# Patient Record
Sex: FEMALE | Race: WHITE | Employment: FULL TIME | URBAN - NONMETROPOLITAN AREA
[De-identification: names, ages, dates, MRNs, and addresses within clinical notes are randomized per-mention and may not be internally consistent; named-entity substitution may affect disease eponyms.]

---

## 2019-09-25 ENCOUNTER — HOSPITAL ENCOUNTER (OUTPATIENT)
Dept: WOMENS IMAGING | Age: 44
Discharge: HOME | End: 2019-09-25
Payer: COMMERCIAL

## 2019-09-25 DIAGNOSIS — Z12.39 BREAST CANCER SCREENING: ICD-10-CM

## 2019-09-25 DIAGNOSIS — N95.8 ARTIFICIAL MENOPAUSE: ICD-10-CM

## 2019-09-25 PROCEDURE — 77080 DXA BONE DENSITY AXIAL: CPT

## 2019-09-25 PROCEDURE — 77063 BREAST TOMOSYNTHESIS BI: CPT

## 2021-06-11 ENCOUNTER — OFFICE VISIT (OUTPATIENT)
Dept: OBSTETRICS AND GYNECOLOGY | Facility: CLINIC | Age: 46
End: 2021-06-11

## 2021-06-11 VITALS
SYSTOLIC BLOOD PRESSURE: 118 MMHG | WEIGHT: 189 LBS | HEIGHT: 65 IN | DIASTOLIC BLOOD PRESSURE: 70 MMHG | BODY MASS INDEX: 31.49 KG/M2

## 2021-06-11 DIAGNOSIS — R53.83 FATIGUE, UNSPECIFIED TYPE: ICD-10-CM

## 2021-06-11 DIAGNOSIS — Z80.3 FAMILY HISTORY OF BREAST CANCER: ICD-10-CM

## 2021-06-11 DIAGNOSIS — Z01.419 WELL WOMAN EXAM WITH ROUTINE GYNECOLOGICAL EXAM: Primary | ICD-10-CM

## 2021-06-11 DIAGNOSIS — Z80.41 FAMILY HISTORY OF OVARIAN CANCER: ICD-10-CM

## 2021-06-11 DIAGNOSIS — Z12.31 ENCOUNTER FOR SCREENING MAMMOGRAM FOR MALIGNANT NEOPLASM OF BREAST: ICD-10-CM

## 2021-06-11 DIAGNOSIS — R23.2 HOT FLASHES: ICD-10-CM

## 2021-06-11 DIAGNOSIS — Z12.4 CERVICAL CANCER SCREENING: ICD-10-CM

## 2021-06-11 PROCEDURE — 99386 PREV VISIT NEW AGE 40-64: CPT | Performed by: NURSE PRACTITIONER

## 2021-06-11 PROCEDURE — 99214 OFFICE O/P EST MOD 30 MIN: CPT | Performed by: NURSE PRACTITIONER

## 2021-06-11 RX ORDER — MONTELUKAST SODIUM 10 MG/1
10 TABLET ORAL
COMMUNITY
Start: 2021-03-25 | End: 2021-11-12 | Stop reason: SDUPTHER

## 2021-06-11 RX ORDER — ESTRADIOL 0.5 MG/1
0.5 TABLET ORAL DAILY
COMMUNITY
Start: 2021-05-25 | End: 2021-08-24

## 2021-06-11 RX ORDER — LEVOCETIRIZINE DIHYDROCHLORIDE 5 MG/1
5 TABLET, FILM COATED ORAL EVERY EVENING
COMMUNITY

## 2021-06-11 RX ORDER — ESCITALOPRAM OXALATE 10 MG/1
10 TABLET ORAL DAILY
COMMUNITY
Start: 2021-03-25 | End: 2022-02-23 | Stop reason: SDUPTHER

## 2021-06-11 RX ORDER — OCTISALATE, AVOBENZONE, HOMOSALATE, AND OCTOCRYLENE 29.4; 29.4; 49; 25.48 MG/ML; MG/ML; MG/ML; MG/ML
2 LOTION TOPICAL DAILY
COMMUNITY

## 2021-06-12 LAB
25(OH)D3+25(OH)D2 SERPL-MCNC: 48.7 NG/ML (ref 30–100)
ALBUMIN SERPL-MCNC: 4.9 G/DL (ref 3.5–5.2)
ALBUMIN/GLOB SERPL: 2.7 G/DL
ALP SERPL-CCNC: 72 U/L (ref 39–117)
ALT SERPL-CCNC: 30 U/L (ref 1–33)
AST SERPL-CCNC: 24 U/L (ref 1–32)
BASOPHILS # BLD AUTO: 0.06 10*3/MM3 (ref 0–0.2)
BASOPHILS NFR BLD AUTO: 1.3 % (ref 0–1.5)
BILIRUB SERPL-MCNC: 0.8 MG/DL (ref 0–1.2)
BUN SERPL-MCNC: 16 MG/DL (ref 6–20)
BUN/CREAT SERPL: 19.5 (ref 7–25)
CALCIUM SERPL-MCNC: 10.4 MG/DL (ref 8.6–10.5)
CHLORIDE SERPL-SCNC: 100 MMOL/L (ref 98–107)
CHOLEST SERPL-MCNC: 217 MG/DL (ref 0–200)
CO2 SERPL-SCNC: 31.1 MMOL/L (ref 22–29)
CORTIS SERPL-MCNC: 6 UG/DL
CREAT SERPL-MCNC: 0.82 MG/DL (ref 0.57–1)
DHEA-S SERPL-MCNC: 107 UG/DL (ref 41.2–243.7)
EOSINOPHIL # BLD AUTO: 0.13 10*3/MM3 (ref 0–0.4)
EOSINOPHIL NFR BLD AUTO: 2.8 % (ref 0.3–6.2)
ERYTHROCYTE [DISTWIDTH] IN BLOOD BY AUTOMATED COUNT: 12.9 % (ref 12.3–15.4)
ESTRADIOL SERPL-MCNC: 29.7 PG/ML
FSH SERPL-ACNC: 50.8 MIU/ML
GLOBULIN SER CALC-MCNC: 1.8 GM/DL
GLUCOSE SERPL-MCNC: 88 MG/DL (ref 65–99)
HBA1C MFR BLD: 5.5 % (ref 4.8–5.6)
HCT VFR BLD AUTO: 45.9 % (ref 34–46.6)
HDLC SERPL-MCNC: 40 MG/DL (ref 40–60)
HGB BLD-MCNC: 15.3 G/DL (ref 12–15.9)
IMM GRANULOCYTES # BLD AUTO: 0.01 10*3/MM3 (ref 0–0.05)
IMM GRANULOCYTES NFR BLD AUTO: 0.2 % (ref 0–0.5)
LDLC SERPL CALC-MCNC: 137 MG/DL (ref 0–100)
LDLC/HDLC SERPL: 3.32 {RATIO}
LH SERPL-ACNC: 37.2 MIU/ML
LYMPHOCYTES # BLD AUTO: 1.26 10*3/MM3 (ref 0.7–3.1)
LYMPHOCYTES NFR BLD AUTO: 26.8 % (ref 19.6–45.3)
MCH RBC QN AUTO: 29.9 PG (ref 26.6–33)
MCHC RBC AUTO-ENTMCNC: 33.3 G/DL (ref 31.5–35.7)
MCV RBC AUTO: 89.6 FL (ref 79–97)
MONOCYTES # BLD AUTO: 0.61 10*3/MM3 (ref 0.1–0.9)
MONOCYTES NFR BLD AUTO: 13 % (ref 5–12)
NEUTROPHILS # BLD AUTO: 2.63 10*3/MM3 (ref 1.7–7)
NEUTROPHILS NFR BLD AUTO: 55.9 % (ref 42.7–76)
NRBC BLD AUTO-RTO: 0 /100 WBC (ref 0–0.2)
PLATELET # BLD AUTO: 281 10*3/MM3 (ref 140–450)
POTASSIUM SERPL-SCNC: 4 MMOL/L (ref 3.5–5.2)
PROGEST SERPL-MCNC: <0.1 NG/ML
PROT SERPL-MCNC: 6.7 G/DL (ref 6–8.5)
RBC # BLD AUTO: 5.12 10*6/MM3 (ref 3.77–5.28)
SODIUM SERPL-SCNC: 144 MMOL/L (ref 136–145)
T4 FREE SERPL-MCNC: 1.06 NG/DL (ref 0.93–1.7)
TESTOST SERPL-MCNC: 20 NG/DL (ref 4–50)
TRIGL SERPL-MCNC: 221 MG/DL (ref 0–150)
TSH SERPL DL<=0.005 MIU/L-ACNC: 1.85 UIU/ML (ref 0.27–4.2)
VLDLC SERPL CALC-MCNC: 40 MG/DL (ref 5–40)
WBC # BLD AUTO: 4.7 10*3/MM3 (ref 3.4–10.8)

## 2021-06-14 DIAGNOSIS — Z80.3 FAMILY HISTORY OF BREAST CANCER: Primary | ICD-10-CM

## 2021-06-28 ENCOUNTER — HOSPITAL ENCOUNTER (OUTPATIENT)
Dept: MAMMOGRAPHY | Facility: HOSPITAL | Age: 46
Discharge: HOME | End: 2021-06-28
Admitting: NURSE PRACTITIONER

## 2021-06-28 ENCOUNTER — LAB (OUTPATIENT)
Dept: LAB | Facility: HOSPITAL | Age: 46
End: 2021-06-28

## 2021-06-28 DIAGNOSIS — Z12.31 ENCOUNTER FOR SCREENING MAMMOGRAM FOR MALIGNANT NEOPLASM OF BREAST: ICD-10-CM

## 2021-06-28 DIAGNOSIS — Z80.3 FAMILY HISTORY OF BREAST CANCER: ICD-10-CM

## 2021-06-28 PROCEDURE — 77067 SCR MAMMO BI INCL CAD: CPT

## 2021-06-28 PROCEDURE — 77063 BREAST TOMOSYNTHESIS BI: CPT

## 2021-07-28 ENCOUNTER — OFFICE VISIT (OUTPATIENT)
Dept: FAMILY MEDICINE CLINIC | Facility: CLINIC | Age: 46
End: 2021-07-28

## 2021-07-28 VITALS
OXYGEN SATURATION: 98 % | WEIGHT: 191.2 LBS | SYSTOLIC BLOOD PRESSURE: 122 MMHG | HEIGHT: 67 IN | HEART RATE: 67 BPM | DIASTOLIC BLOOD PRESSURE: 78 MMHG | BODY MASS INDEX: 30.01 KG/M2 | TEMPERATURE: 98.6 F

## 2021-07-28 DIAGNOSIS — R91.1 PULMONARY NODULE: Primary | ICD-10-CM

## 2021-07-28 DIAGNOSIS — R52 PAIN: ICD-10-CM

## 2021-07-28 PROCEDURE — 99203 OFFICE O/P NEW LOW 30 MIN: CPT | Performed by: FAMILY MEDICINE

## 2021-07-28 NOTE — PROGRESS NOTES
Subjective   Екатерина Stovall is a 45 y.o. female.     45-year-old female with pain right ear-history of pulmonary nodule on the left      The following portions of the patient's history were reviewed and updated as appropriate: allergies, current medications, past family history, past medical history, past social history, past surgical history and problem list.    Review of Systems   HENT: Negative for facial swelling, mouth sores and postnasal drip.    Respiratory:        Pulmonary nodule left lung followed by low-dose CT last done  In 2016   Cardiovascular: Negative for chest pain.       Objective   Physical Exam  Vitals and nursing note reviewed.   Constitutional:       Appearance: Normal appearance.   HENT:      Right Ear: Tympanic membrane and ear canal normal.      Left Ear: Tympanic membrane and ear canal normal.   Cardiovascular:      Rate and Rhythm: Normal rate and regular rhythm.      Pulses: Normal pulses.      Heart sounds: Normal heart sounds.   Pulmonary:      Effort: Pulmonary effort is normal.      Breath sounds: Normal breath sounds.   Neurological:      General: No focal deficit present.      Mental Status: She is alert and oriented to person, place, and time.   Psychiatric:         Mood and Affect: Mood normal.         Behavior: Behavior normal.         Assessment/Plan   Diagnoses and all orders for this visit:    1. Pulmonary nodule (Primary)  -     CT Chest Low Dose Wo; Future    2. Pain       Plan above plus TMJ on the right--advised to get Materna vaccinations plus low-dose CT

## 2021-07-28 NOTE — PATIENT INSTRUCTIONS
Mediterranean Diet  A Mediterranean diet refers to food and lifestyle choices that are based on the traditions of countries located on the [place]. This way of eating has been shown to help prevent certain conditions and improve outcomes for people who have chronic diseases, like kidney disease and heart disease.  What are tips for following this plan?  Lifestyle  · Cook and eat meals together with your family, when possible.  · Drink enough fluid to keep your urine clear or pale yellow.  · Be physically active every day. This includes:  ? Aerobic exercise like running or swimming.  ? Leisure activities like gardening, walking, or housework.  · Get 7-8 hours of sleep each night.  · If recommended by your health care provider, drink red wine in moderation. This means 1 glass a day for nonpregnant women and 2 glasses a day for men. A glass of wine equals 5 oz (150 mL).  Reading food labels    · Check the serving size of packaged foods. For foods such as rice and pasta, the serving size refers to the amount of cooked product, not dry.  · Check the total fat in packaged foods. Avoid foods that have saturated fat or trans fats.  · Check the ingredients list for added sugars, such as corn syrup.  Shopping  · At the grocery store, buy most of your food from the areas near the walls of the store. This includes:  ? Fresh fruits and vegetables (produce).  ? Grains, beans, nuts, and seeds. Some of these may be available in unpackaged forms or large amounts (in bulk).  ? Fresh seafood.  ? Poultry and eggs.  ? Low-fat dairy products.  · Buy whole ingredients instead of prepackaged foods.  · Buy fresh fruits and vegetables in-season from local farmers markets.  · Buy frozen fruits and vegetables in resealable bags.  · If you do not have access to quality fresh seafood, buy precooked frozen shrimp or canned fish, such as tuna, salmon, or sardines.  · Buy small amounts of raw or cooked vegetables, salads, or olives from  the deli or salad bar at your store.  · Stock your pantry so you always have certain foods on hand, such as olive oil, canned tuna, canned tomatoes, rice, pasta, and beans.  Cooking  · Cook foods with extra-virgin olive oil instead of using butter or other vegetable oils.  · Have meat as a side dish, and have vegetables or grains as your main dish. This means having meat in small portions or adding small amounts of meat to foods like pasta or stew.  · Use beans or vegetables instead of meat in common dishes like chili or lasagna.  · [address] with different cooking methods. Try roasting or broiling vegetables instead of steaming or sautéeing them.  · Add frozen vegetables to soups, stews, pasta, or rice.  · Add nuts or seeds for added healthy fat at each meal. You can add these to yogurt, salads, or vegetable dishes.  · Marinate fish or vegetables using olive oil, lemon juice, garlic, and fresh herbs.  Meal planning    · Plan to eat 1 vegetarian meal one day each week. Try to work up to 2 vegetarian meals, if possible.  · Eat seafood 2 or more times a week.  · Have healthy snacks readily available, such as:  ? Vegetable sticks with hummus.  ? Greek yogurt.  ? Fruit and nut trail mix.  · Eat balanced meals throughout the week. This includes:  ? Fruit: 2-3 servings a day  ? Vegetables: 4-5 servings a day  ? Low-fat dairy: 2 servings a day  ? Fish, poultry, or lean meat: 1 serving a day  ? Beans and legumes: 2 or more servings a week  ? Nuts and seeds: 1-2 servings a day  ? Whole grains: 6-8 servings a day  ? Extra-virgin olive oil: 3-4 servings a day  · Limit red meat and sweets to only a few servings a month  What are my food choices?  · Mediterranean diet  ? Recommended  § Grains: Whole-grain pasta. Brown rice. Bulgar wheat. Polenta. Couscous. Whole-wheat bread. Oatmeal. Quinoa.  § Vegetables: Artichokes. Beets. Broccoli. Cabbage. Carrots. Eggplant. Green beans. Chard. Kale. Spinach. Onions. Leeks. Peas. Squash.  Tomatoes. Peppers. Radishes.  § Fruits: Apples. Apricots. Avocado. Berries. Bananas. Cherries. Dates. Figs. Grapes. Darian. Melon. Oranges. Peaches. Plums. Pomegranate.  § Meats and other protein foods: Beans. Almonds. Sunflower seeds. Pine nuts. Peanuts. Cod. Vermilion. Scallops. Shrimp. Tuna. Tilapia. Clams. Oysters. Eggs.  § Dairy: Low-fat milk. Cheese. Greek yogurt.  § Beverages: Water. Red wine. Herbal tea.  § Fats and oils: Extra virgin olive oil. Avocado oil. Grape seed oil.  § Sweets and desserts: Greek yogurt with honey. Baked apples. Poached pears. Trail mix.  § Seasoning and other foods: Basil. Cilantro. Coriander. Cumin. Mint. Parsley. Augustine. Rosemary. Tarragon. Garlic. Oregano. Thyme. Pepper. Balsalmic vinegar. Tahini. Hummus. Tomato sauce. Olives. Mushrooms.  ? Limit these  § Grains: Prepackaged pasta or rice dishes. Prepackaged cereal with added sugar.  § Vegetables: Deep fried potatoes (french fries).  § Fruits: Fruit canned in syrup.  § Meats and other protein foods: Beef. Pork. Lamb. Poultry with skin. Hot dogs. [place].  § Dairy: Ice cream. Sour cream. Whole milk.  § Beverages: Juice. Sugar-sweetened soft drinks. Beer. Liquor and spirits.  § Fats and oils: Butter. Canola oil. Vegetable oil. Beef fat (tallow). Lard.  § Sweets and desserts: Cookies. Cakes. Pies. Candy.  § Seasoning and other foods: Mayonnaise. Premade sauces and marinades.  The items listed may not be a complete list. Talk with your dietitian about what dietary choices are right for you.  Summary  · The Mediterranean diet includes both food and lifestyle choices.  · Eat a variety of fresh fruits and vegetables, beans, nuts, seeds, and whole grains.  · Limit the amount of red meat and sweets that you eat.  · Talk with your health care provider about whether it is safe for you to drink red wine in moderation. This means 1 glass a day for nonpregnant women and 2 glasses a day for men. A glass of wine equals 5 oz (150 mL).  This information  is not intended to replace advice given to you by your health care provider. Make sure you discuss any questions you have with your health care provider.  Document Revised: 08/17/2017 Document Reviewed: 08/10/2017  Elsevier Patient Education © 2020 [employer]

## 2021-08-06 ENCOUNTER — TELEPHONE (OUTPATIENT)
Dept: FAMILY MEDICINE CLINIC | Facility: CLINIC | Age: 46
End: 2021-08-06

## 2021-08-06 ENCOUNTER — HOSPITAL ENCOUNTER (OUTPATIENT)
Dept: CT IMAGING | Facility: HOSPITAL | Age: 46
Discharge: HOME | End: 2021-08-06
Admitting: FAMILY MEDICINE

## 2021-08-06 ENCOUNTER — PATIENT ROUNDING (BHMG ONLY) (OUTPATIENT)
Dept: FAMILY MEDICINE CLINIC | Facility: CLINIC | Age: 46
End: 2021-08-06

## 2021-08-06 DIAGNOSIS — R91.1 PULMONARY NODULE: ICD-10-CM

## 2021-08-06 PROCEDURE — 71250 CT THORAX DX C-: CPT

## 2021-08-06 NOTE — TELEPHONE ENCOUNTER
Caller: Екатерина Stovall    Relationship to patient: Self    Best call back number: [phone]    CAN PATIENT GET THE MODERNA VACCINE NOW?

## 2021-08-06 NOTE — PROGRESS NOTES
August 6, 2021    Hello, may I speak with Екатерина Stovall?    My name is Alisha Ramirez     I am the Practice Manager with [facility]  [address] [facility]  [facility] [id number] [phone].    Before we get started may I verify your date of birth? 1975    I am calling to officially welcome you to our practice and ask about your recent visit. Is this a good time to talk? yes    Tell me about your visit with us. What things went well?  Patient stated that everything with visit went very well.       We're always looking for ways to make our patients' experiences even better. Do you have recommendations on ways we may improve?  no    Overall were you satisfied with your first visit to our practice? yes       I appreciate you taking the time to speak with me today. Is there anything else I can do for you? no      Thank you, and have a great day.

## 2021-08-24 ENCOUNTER — TELEMEDICINE (OUTPATIENT)
Dept: OBSTETRICS AND GYNECOLOGY | Facility: CLINIC | Age: 46
End: 2021-08-24

## 2021-08-24 DIAGNOSIS — E66.9 CLASS 1 OBESITY WITHOUT SERIOUS COMORBIDITY WITH BODY MASS INDEX (BMI) OF 32.0 TO 32.9 IN ADULT, UNSPECIFIED OBESITY TYPE: ICD-10-CM

## 2021-08-24 DIAGNOSIS — E66.3 OVERWEIGHT: ICD-10-CM

## 2021-08-24 DIAGNOSIS — N95.1 MENOPAUSAL STATE: Primary | ICD-10-CM

## 2021-08-24 PROCEDURE — 99213 OFFICE O/P EST LOW 20 MIN: CPT | Performed by: NURSE PRACTITIONER

## 2021-08-24 RX ORDER — PHENTERMINE HYDROCHLORIDE 37.5 MG/1
37.5 CAPSULE ORAL EVERY MORNING
Qty: 30 CAPSULE | Refills: 0 | Status: SHIPPED | OUTPATIENT
Start: 2021-08-24 | End: 2021-09-20 | Stop reason: SDUPTHER

## 2021-08-24 RX ORDER — ESTRADIOL 1 MG/1
1 TABLET ORAL DAILY
Qty: 30 TABLET | Refills: 2 | Status: SHIPPED | OUTPATIENT
Start: 2021-08-24 | End: 2022-01-03 | Stop reason: SDUPTHER

## 2021-08-24 NOTE — PROGRESS NOTES
Patient here via video visit to review hormone panel labs and to discuss starting something for weight loss.  Reviewed labs at length with patient.  Increased Estrace from 0.5 mg to 1 mg r/t hot flashes/night sweats.      Patient wants to discuss weight loss aids.  Weighed 192 lbs this morning for a BMI of 32.  Discussed options and risks/benefits.  Ej reviewed.  RX for Phentermine sent to pharmacy and patient instructed in use.  RTO in 4 weeks for f/u or sooner prn.

## 2021-08-25 ENCOUNTER — TELEPHONE (OUTPATIENT)
Dept: OBSTETRICS AND GYNECOLOGY | Facility: CLINIC | Age: 46
End: 2021-08-25

## 2021-08-25 NOTE — TELEPHONE ENCOUNTER
Patient pharmacy contacted the office stating they do not have the Phentermine capsules that were sent in yesterday and wanting to know if it is ok to substitute with the Phentermine tablets. Spoke with pharmacist and informed that substitute tablets for capsules.

## 2021-09-17 ENCOUNTER — OFFICE VISIT (OUTPATIENT)
Dept: FAMILY MEDICINE CLINIC | Facility: CLINIC | Age: 46
End: 2021-09-17

## 2021-09-17 VITALS
SYSTOLIC BLOOD PRESSURE: 115 MMHG | WEIGHT: 186.2 LBS | HEART RATE: 73 BPM | TEMPERATURE: 98.4 F | OXYGEN SATURATION: 99 % | DIASTOLIC BLOOD PRESSURE: 73 MMHG | HEIGHT: 67 IN | BODY MASS INDEX: 29.22 KG/M2 | RESPIRATION RATE: 16 BRPM

## 2021-09-17 DIAGNOSIS — M79.671 RIGHT FOOT PAIN: ICD-10-CM

## 2021-09-17 DIAGNOSIS — G57.61 MORTON'S NEUROMA OF RIGHT FOOT: Primary | ICD-10-CM

## 2021-09-17 PROCEDURE — 99213 OFFICE O/P EST LOW 20 MIN: CPT | Performed by: NURSE PRACTITIONER

## 2021-09-17 NOTE — PROGRESS NOTES
CC: right foot pain    History:  Екатерина Stovall is a 45 y.o. female who presents today for evaluation of the above problems.      Pain in her right foot in the ball below 2nd and 3rd toe and then across the top in a similar location.   Present for 1-2 weeks. Getting worse. Feels like she is walking on a rock.  Pain gets worse throughout the day the more she is on it.     Scheduled to get second COVID vaccine on Monday.     HPI  ROS:  Review of Systems   Musculoskeletal:        Right foot pain       No Known Allergies  Past Medical History:   Diagnosis Date   • Anxiety    • Seasonal allergies      Past Surgical History:   Procedure Laterality Date   • APPENDECTOMY     • LAPAROSCOPIC SALPINGOOPHERECTOMY Bilateral    • OOPHORECTOMY     • TOTAL ABDOMINAL HYSTERECTOMY      RIVKA w/ BSO due to pain caused by varicose veins and heavy bleeding.   • TUBAL ABDOMINAL LIGATION       Family History   Problem Relation Age of Onset   • Ovarian cancer Mother    • Breast cancer Paternal Grandmother    • Breast cancer Maternal Grandmother    • Uterine cancer Maternal Aunt    • Breast cancer Maternal Aunt    • Colon cancer Neg Hx    • Melanoma Neg Hx       reports that she has never smoked. She has never used smokeless tobacco. Alcohol use questions deferred to the physician. Drug use questions deferred to the physician.      Current Outpatient Medications:   •  escitalopram (LEXAPRO) 10 MG tablet, Take 10 mg by mouth Daily., Disp: , Rfl:   •  estradiol (Estrace) 1 MG tablet, Take 1 tablet by mouth Daily., Disp: 30 tablet, Rfl: 2  •  levocetirizine (XYZAL) 5 MG tablet, Take 5 mg by mouth Every Evening., Disp: , Rfl:   •  montelukast (SINGULAIR) 10 MG tablet, Take 10 mg by mouth every night at bedtime., Disp: , Rfl:   •  phentermine 37.5 MG capsule, Take 1 capsule by mouth Every Morning., Disp: 30 capsule, Rfl: 0  •  diclofenac (VOLTAREN) 50 MG EC tablet, Take 1 tablet by mouth 2 (Two) Times a Day As Needed (foot pain)., Disp: 60 tablet,  "Rfl: 0  •  Probiotic Product (Align) 4 MG capsule, Take  by mouth., Disp: , Rfl:     OBJECTIVE:  /73 (BP Location: Right arm, Patient Position: Sitting, Cuff Size: Adult)   Pulse 73   Temp 98.4 °F (36.9 °C) (Temporal)   Resp 16   Ht 168.9 cm (66.5\")   Wt 84.5 kg (186 lb 3.2 oz)   SpO2 99%   BMI 29.60 kg/m²    Physical Exam  Vitals reviewed.   Constitutional:       Appearance: She is well-developed.   Cardiovascular:      Rate and Rhythm: Normal rate.   Pulmonary:      Effort: Pulmonary effort is normal.   Neurological:      Mental Status: She is alert and oriented to person, place, and time.   Psychiatric:         Behavior: Behavior normal.         Assessment/Plan    Diagnoses and all orders for this visit:    1. Sigala's neuroma of right foot (Primary)    2. Right foot pain  -     diclofenac (VOLTAREN) 50 MG EC tablet; Take 1 tablet by mouth 2 (Two) Times a Day As Needed (foot pain).  Dispense: 60 tablet; Refill: 0    Advised to take a week off from strenuous exercise and walking.  Would like to do steroids, but can't due to upcoming vaccine.     Advised if no improvement in one week notify clinic for referral.    An After Visit Summary was printed and given to the patient at discharge.  Return if symptoms worsen or fail to improve, for Next scheduled follow up.       BALDO Delgado 9/17/21    Electronically signed.  "

## 2021-09-20 ENCOUNTER — TELEMEDICINE (OUTPATIENT)
Dept: OBSTETRICS AND GYNECOLOGY | Facility: CLINIC | Age: 46
End: 2021-09-20

## 2021-09-20 DIAGNOSIS — E66.9 OBESITY (BMI 30-39.9): Primary | ICD-10-CM

## 2021-09-20 DIAGNOSIS — E66.3 OVERWEIGHT: ICD-10-CM

## 2021-09-20 PROCEDURE — 99213 OFFICE O/P EST LOW 20 MIN: CPT | Performed by: NURSE PRACTITIONER

## 2021-09-20 RX ORDER — PHENTERMINE HYDROCHLORIDE 37.5 MG/1
37.5 CAPSULE ORAL EVERY MORNING
Qty: 30 CAPSULE | Refills: 0 | Status: SHIPPED | OUTPATIENT
Start: 2021-09-20 | End: 2021-11-17 | Stop reason: ALTCHOICE

## 2021-09-20 NOTE — PROGRESS NOTES
Patient seen via video visit for weight check and refill of phentermine.  States she weighed 192 at last visit and weighed 185 on her scale this morning for a total loss of 7 pounds since last visit.  Discussed diet and exercise at length.  Ej reviewed.  Refill sent to pharmacy.  RTO in 4 weeks for follow-up in office or sooner as needed.

## 2021-09-23 ENCOUNTER — APPOINTMENT (OUTPATIENT)
Dept: CT IMAGING | Facility: HOSPITAL | Age: 46
End: 2021-09-23

## 2021-09-23 ENCOUNTER — HOSPITAL ENCOUNTER (EMERGENCY)
Dept: CARDIOLOGY | Facility: HOSPITAL | Age: 46
Discharge: HOME | End: 2021-09-23

## 2021-09-23 ENCOUNTER — APPOINTMENT (OUTPATIENT)
Dept: GENERAL RADIOLOGY | Facility: HOSPITAL | Age: 46
End: 2021-09-23

## 2021-09-23 ENCOUNTER — TELEPHONE (OUTPATIENT)
Dept: FAMILY MEDICINE CLINIC | Facility: CLINIC | Age: 46
End: 2021-09-23

## 2021-09-23 ENCOUNTER — HOSPITAL ENCOUNTER (EMERGENCY)
Facility: HOSPITAL | Age: 46
Discharge: HOME | End: 2021-09-23
Attending: EMERGENCY MEDICINE | Admitting: EMERGENCY MEDICINE

## 2021-09-23 VITALS
BODY MASS INDEX: 30.22 KG/M2 | RESPIRATION RATE: 18 BRPM | HEART RATE: 78 BPM | WEIGHT: 188 LBS | OXYGEN SATURATION: 96 % | TEMPERATURE: 97.9 F | SYSTOLIC BLOOD PRESSURE: 125 MMHG | HEIGHT: 66 IN | DIASTOLIC BLOOD PRESSURE: 75 MMHG

## 2021-09-23 DIAGNOSIS — R00.2 PALPITATIONS: ICD-10-CM

## 2021-09-23 DIAGNOSIS — R53.1 GENERALIZED WEAKNESS: ICD-10-CM

## 2021-09-23 DIAGNOSIS — R51.9 NONINTRACTABLE HEADACHE, UNSPECIFIED CHRONICITY PATTERN, UNSPECIFIED HEADACHE TYPE: ICD-10-CM

## 2021-09-23 DIAGNOSIS — R00.2 PALPITATIONS: Primary | ICD-10-CM

## 2021-09-23 LAB
ALBUMIN SERPL-MCNC: 4.4 G/DL (ref 3.5–5.2)
ALBUMIN/GLOB SERPL: 1.7 G/DL
ALP SERPL-CCNC: 96 U/L (ref 39–117)
ALT SERPL W P-5'-P-CCNC: 80 U/L (ref 1–33)
ANION GAP SERPL CALCULATED.3IONS-SCNC: 12 MMOL/L (ref 5–15)
AST SERPL-CCNC: 64 U/L (ref 1–32)
BASOPHILS # BLD AUTO: 0.04 10*3/MM3 (ref 0–0.2)
BASOPHILS NFR BLD AUTO: 0.9 % (ref 0–1.5)
BILIRUB SERPL-MCNC: 0.3 MG/DL (ref 0–1.2)
BILIRUB UR QL STRIP: NEGATIVE
BUN SERPL-MCNC: 10 MG/DL (ref 6–20)
BUN/CREAT SERPL: 13.7 (ref 7–25)
CALCIUM SPEC-SCNC: 10.1 MG/DL (ref 8.6–10.5)
CHLORIDE SERPL-SCNC: 105 MMOL/L (ref 98–107)
CLARITY UR: CLEAR
CO2 SERPL-SCNC: 27 MMOL/L (ref 22–29)
COLOR UR: YELLOW
CREAT SERPL-MCNC: 0.73 MG/DL (ref 0.57–1)
DEPRECATED RDW RBC AUTO: 39.8 FL (ref 37–54)
EOSINOPHIL # BLD AUTO: 0.22 10*3/MM3 (ref 0–0.4)
EOSINOPHIL NFR BLD AUTO: 5.1 % (ref 0.3–6.2)
ERYTHROCYTE [DISTWIDTH] IN BLOOD BY AUTOMATED COUNT: 12.3 % (ref 12.3–15.4)
GFR SERPL CREATININE-BSD FRML MDRD: 86 ML/MIN/1.73
GLOBULIN UR ELPH-MCNC: 2.6 GM/DL
GLUCOSE SERPL-MCNC: 102 MG/DL (ref 65–99)
GLUCOSE UR STRIP-MCNC: NEGATIVE MG/DL
HCT VFR BLD AUTO: 46.7 % (ref 34–46.6)
HGB BLD-MCNC: 15.2 G/DL (ref 12–15.9)
HGB UR QL STRIP.AUTO: NEGATIVE
HOLD SPECIMEN: NORMAL
IMM GRANULOCYTES # BLD AUTO: 0.01 10*3/MM3 (ref 0–0.05)
IMM GRANULOCYTES NFR BLD AUTO: 0.2 % (ref 0–0.5)
KETONES UR QL STRIP: NEGATIVE
LEUKOCYTE ESTERASE UR QL STRIP.AUTO: NEGATIVE
LYMPHOCYTES # BLD AUTO: 0.78 10*3/MM3 (ref 0.7–3.1)
LYMPHOCYTES NFR BLD AUTO: 18.1 % (ref 19.6–45.3)
MAGNESIUM SERPL-MCNC: 2 MG/DL (ref 1.6–2.6)
MCH RBC QN AUTO: 28.6 PG (ref 26.6–33)
MCHC RBC AUTO-ENTMCNC: 32.5 G/DL (ref 31.5–35.7)
MCV RBC AUTO: 87.8 FL (ref 79–97)
MONOCYTES # BLD AUTO: 0.61 10*3/MM3 (ref 0.1–0.9)
MONOCYTES NFR BLD AUTO: 14.1 % (ref 5–12)
NEUTROPHILS NFR BLD AUTO: 2.66 10*3/MM3 (ref 1.7–7)
NEUTROPHILS NFR BLD AUTO: 61.6 % (ref 42.7–76)
NITRITE UR QL STRIP: NEGATIVE
NRBC BLD AUTO-RTO: 0 /100 WBC (ref 0–0.2)
PH UR STRIP.AUTO: 6.5 [PH] (ref 5–8)
PLATELET # BLD AUTO: 230 10*3/MM3 (ref 140–450)
PMV BLD AUTO: 9.2 FL (ref 6–12)
POTASSIUM SERPL-SCNC: 4.4 MMOL/L (ref 3.5–5.2)
PROT SERPL-MCNC: 7 G/DL (ref 6–8.5)
PROT UR QL STRIP: NEGATIVE
RBC # BLD AUTO: 5.32 10*6/MM3 (ref 3.77–5.28)
SARS-COV-2 RNA PNL SPEC NAA+PROBE: NOT DETECTED
SODIUM SERPL-SCNC: 144 MMOL/L (ref 136–145)
SP GR UR STRIP: 1.01 (ref 1–1.03)
TSH SERPL DL<=0.05 MIU/L-ACNC: 3.36 UIU/ML (ref 0.27–4.2)
UROBILINOGEN UR QL STRIP: NORMAL
WBC # BLD AUTO: 4.32 10*3/MM3 (ref 3.4–10.8)
WHOLE BLOOD HOLD SPECIMEN: NORMAL
WHOLE BLOOD HOLD SPECIMEN: NORMAL

## 2021-09-23 PROCEDURE — 81003 URINALYSIS AUTO W/O SCOPE: CPT | Performed by: EMERGENCY MEDICINE

## 2021-09-23 PROCEDURE — 87635 SARS-COV-2 COVID-19 AMP PRB: CPT | Performed by: EMERGENCY MEDICINE

## 2021-09-23 PROCEDURE — 71045 X-RAY EXAM CHEST 1 VIEW: CPT

## 2021-09-23 PROCEDURE — 83735 ASSAY OF MAGNESIUM: CPT | Performed by: EMERGENCY MEDICINE

## 2021-09-23 PROCEDURE — 99284 EMERGENCY DEPT VISIT MOD MDM: CPT

## 2021-09-23 PROCEDURE — 93246 EXT ECG>7D<15D RECORDING: CPT

## 2021-09-23 PROCEDURE — 70450 CT HEAD/BRAIN W/O DYE: CPT

## 2021-09-23 PROCEDURE — 93010 ELECTROCARDIOGRAM REPORT: CPT | Performed by: INTERNAL MEDICINE

## 2021-09-23 PROCEDURE — 85025 COMPLETE CBC W/AUTO DIFF WBC: CPT | Performed by: EMERGENCY MEDICINE

## 2021-09-23 PROCEDURE — 80053 COMPREHEN METABOLIC PANEL: CPT | Performed by: EMERGENCY MEDICINE

## 2021-09-23 PROCEDURE — 84443 ASSAY THYROID STIM HORMONE: CPT | Performed by: EMERGENCY MEDICINE

## 2021-09-23 PROCEDURE — 93005 ELECTROCARDIOGRAM TRACING: CPT | Performed by: EMERGENCY MEDICINE

## 2021-09-23 RX ORDER — SODIUM CHLORIDE 0.9 % (FLUSH) 0.9 %
10 SYRINGE (ML) INJECTION AS NEEDED
Status: DISCONTINUED | OUTPATIENT
Start: 2021-09-23 | End: 2021-09-23 | Stop reason: HOSPADM

## 2021-09-23 RX ADMIN — SODIUM CHLORIDE, POTASSIUM CHLORIDE, SODIUM LACTATE AND CALCIUM CHLORIDE 1000 ML: 600; 310; 30; 20 INJECTION, SOLUTION INTRAVENOUS at 10:30

## 2021-09-23 NOTE — TELEPHONE ENCOUNTER
Caller: Екатерина Stovall    Relationship to patient: Self    Best call back number: [phone] (M)    Chief complaint: NUMBING/TINGLING AND WEAKNESS OF HER RIGHT  HAND     Patient directed to call 911 or go to their nearest emergency room.     Patient verbalized understanding: [x] Yes  [] No  If no, why?    Additional notes:

## 2021-09-23 NOTE — TELEPHONE ENCOUNTER
Spoke with patient.  Had second covid vaccine.  Has not felt good.  Patient states lethargic, heart racing, numbness in right arm and hand.  Patient is going to [facility] to be evaluated.

## 2021-09-23 NOTE — ED PROVIDER NOTES
"Subjective   45-year-old female presents to the emergency department complaint of headache, palpitations, left-sided jaw numbness and right hand numbness and tingling.  She has a history of anxiety and seasonal allergies, she is also been taking phentermine for the past 1 month.  Patient ports onset of symptoms 2 to 3 days ago.  She received her second mother and a COVID-19 vaccine 3 days ago and states she is felt bad since.  She reports associated fatigue, heart feels like it is \"racing\", has had some low-grade fever with a T-max of 101.  She also reports some abdominal cramping that has since resolved.  No vomiting or diarrhea, no chest pain, no unilateral leg pain or swelling, no history of DVT or PE.  She states her headache is generalized, no associated photophobia, neck stiffness, altered mental status.  She does not have a history of headache disorder and states current headache is severe with no aggravating alleviating factors.      History provided by:  Patient      Review of Systems   All other systems reviewed and are negative.      Past Medical History:   Diagnosis Date   • Anxiety    • Seasonal allergies        No Known Allergies    Past Surgical History:   Procedure Laterality Date   • APPENDECTOMY     • LAPAROSCOPIC SALPINGOOPHERECTOMY Bilateral    • OOPHORECTOMY     • TOTAL ABDOMINAL HYSTERECTOMY      RIVKA w/ BSO due to pain caused by varicose veins and heavy bleeding.   • TUBAL ABDOMINAL LIGATION         Family History   Problem Relation Age of Onset   • Ovarian cancer Mother    • Breast cancer Paternal Grandmother    • Breast cancer Maternal Grandmother    • Uterine cancer Maternal Aunt    • Breast cancer Maternal Aunt    • Colon cancer Neg Hx    • Melanoma Neg Hx        Social History     Socioeconomic History   • Marital status:      Spouse name: Not on file   • Number of children: Not on file   • Years of education: Not on file   • Highest education level: Not on file   Tobacco Use   • " Smoking status: Never Smoker   • Smokeless tobacco: Never Used   Substance and Sexual Activity   • Alcohol use: Not Currently   • Drug use: Not Currently           Objective   Physical Exam  Vitals and nursing note reviewed.   Constitutional:       General: She is not in acute distress.     Appearance: Normal appearance. She is normal weight.   HENT:      Head: Normocephalic and atraumatic.      Nose: Nose normal.      Mouth/Throat:      Mouth: Mucous membranes are moist.      Pharynx: Oropharynx is clear. No oropharyngeal exudate or posterior oropharyngeal erythema.   Eyes:      Extraocular Movements: Extraocular movements intact.      Conjunctiva/sclera: Conjunctivae normal.      Pupils: Pupils are equal, round, and reactive to light.   Cardiovascular:      Rate and Rhythm: Normal rate and regular rhythm.      Pulses: Normal pulses.      Heart sounds: Normal heart sounds.   Pulmonary:      Effort: Pulmonary effort is normal.      Breath sounds: Normal breath sounds. No wheezing, rhonchi or rales.   Abdominal:      General: Abdomen is flat. Bowel sounds are normal. There is no distension.      Palpations: Abdomen is soft.      Tenderness: There is no abdominal tenderness.   Musculoskeletal:         General: No tenderness. Normal range of motion.      Cervical back: Normal range of motion and neck supple. No rigidity. No muscular tenderness.      Right lower leg: No edema.      Left lower leg: No edema.   Skin:     General: Skin is warm and dry.      Capillary Refill: Capillary refill takes less than 2 seconds.      Findings: No rash.   Neurological:      General: No focal deficit present.      Mental Status: She is alert and oriented to person, place, and time. Mental status is at baseline.      Cranial Nerves: No cranial nerve deficit.      Sensory: No sensory deficit.      Motor: No weakness.   Psychiatric:         Mood and Affect: Mood normal.         Behavior: Behavior normal.         Thought Content: Thought  content normal.         Procedures         Lab Results (last 24 hours)     Procedure Component Value Units Date/Time    [place] Blood Culture Bottle Set [[id number]] Collected: 09/23/21 1021    Specimen: Blood from Arm, Left Updated: 09/23/21 1131     Extra Tube Hold for add-ons.     Comment: Auto resulted.       CBC & Differential [[id number]]  (Abnormal) Collected: 09/23/21 1021    Specimen: Blood from Arm, Left Updated: 09/23/21 1043    Narrative:      The following orders were created for panel order CBC & Differential.  Procedure                               Abnormality         Status                     ---------                               -----------         ------                     CBC Auto Differential[[id number]]        Abnormal            Final result                 Please view results for these tests on the individual orders.    Comprehensive Metabolic Panel [[id number]]  (Abnormal) Collected: 09/23/21 1021    Specimen: Blood from Arm, Left Updated: 09/23/21 1107     Glucose 102 mg/dL      BUN 10 mg/dL      Creatinine 0.73 mg/dL      Sodium 144 mmol/L      Potassium 4.4 mmol/L      Chloride 105 mmol/L      CO2 27.0 mmol/L      Calcium 10.1 mg/dL      Total Protein 7.0 g/dL      Albumin 4.40 g/dL      ALT (SGPT) 80 U/L      AST (SGOT) 64 U/L      Alkaline Phosphatase 96 U/L      Total Bilirubin 0.3 mg/dL      eGFR Non African Amer 86 mL/min/1.73      Globulin 2.6 gm/dL      A/G Ratio 1.7 g/dL      BUN/Creatinine Ratio 13.7     Anion Gap 12.0 mmol/L     Narrative:      GFR Normal >60  Chronic Kidney Disease <60  Kidney Failure <15      TSH [[id number]]  (Normal) Collected: 09/23/21 1021    Specimen: Blood from Arm, Left Updated: 09/23/21 1114     TSH 3.360 uIU/mL     Magnesium [[id number]]  (Normal) Collected: 09/23/21 1021    Specimen: Blood from Arm, Left Updated: 09/23/21 1101     Magnesium 2.0 mg/dL     CBC Auto Differential [[id number]]  (Abnormal) Collected: 09/23/21 1021    Specimen: Blood from Arm,  Left Updated: 09/23/21 1043     WBC 4.32 10*3/mm3      RBC 5.32 10*6/mm3      Hemoglobin 15.2 g/dL      Hematocrit 46.7 %      MCV 87.8 fL      MCH 28.6 pg      MCHC 32.5 g/dL      RDW 12.3 %      RDW-SD 39.8 fl      MPV 9.2 fL      Platelets 230 10*3/mm3      Neutrophil % 61.6 %      Lymphocyte % 18.1 %      Monocyte % 14.1 %      Eosinophil % 5.1 %      Basophil % 0.9 %      Immature Grans % 0.2 %      Neutrophils, Absolute 2.66 10*3/mm3      Lymphocytes, Absolute 0.78 10*3/mm3      Monocytes, Absolute 0.61 10*3/mm3      Eosinophils, Absolute 0.22 10*3/mm3      Basophils, Absolute 0.04 10*3/mm3      Immature Grans, Absolute 0.01 10*3/mm3      nRBC 0.0 /100 WBC     Urinalysis With Microscopic If Indicated (No Culture) - Urine, Clean Catch [[id number]]  (Normal) Collected: 09/23/21 1022    Specimen: Urine, Clean Catch Updated: 09/23/21 1044     Color, UA Yellow     Appearance, UA Clear     pH, UA 6.5     Specific Gravity, UA 1.009     Glucose, UA Negative     Ketones, UA Negative     Bilirubin, UA Negative     Blood, UA Negative     Protein, UA Negative     Leuk Esterase, UA Negative     Nitrite, UA Negative     Urobilinogen, UA 0.2 E.U./dL    Narrative:      Urine microscopic not indicated.    COVID PRE-OP / PRE-PROCEDURE SCREENING ORDER (NO ISOLATION) - Swab, Nasal Cavity [[id number]]  (Normal) Collected: 09/23/21 1044    Specimen: Swab from Nasal Cavity Updated: 09/23/21 1154    Narrative:      The following orders were created for panel order COVID PRE-OP / PRE-PROCEDURE SCREENING ORDER (NO ISOLATION) - Swab, Nasal Cavity.  Procedure                               Abnormality         Status                     ---------                               -----------         ------                     COVID-19,[facility]...[[id number]]  Normal              Final result                 Please view results for these tests on the individual orders.    COVID-19,[facility]HOU[facility],Nasal Swab No Transport Media 3-4 HR TAT -  Swab, Nasal Cavity [[id number]]  (Normal) Collected: 09/23/21 1044    Specimen: Swab from Nasal Cavity Updated: 09/23/21 1154     COVID19 Not Detected    Narrative:      Fact sheet for providers: https://www.fda.gov/media/904919/download     Fact sheet for patients: https://www.fda.gov/media/412210/download    Test performed by PCR.    Consider negative results in combination with clinical observations, patient history, and epidemiological information.      CT Head Without Contrast    Result Date: 9/23/2021  EXAMINATION: CT HEAD WO CONTRAST-   9/23/2021 11:35 AM CDT  HISTORY: headache, numbness  In order to have a CT radiation dose as low as reasonably achievable Automated Exposure Control was utilized for adjustment of the mA and/or KV according to patient size.  DLP in mGycm= 571.  Axial, sagittal, and coronal noncontrast CT imaging of the head.  The visualized paranasal sinuses are clear.  The brain and ventricles have an age appropriate appearance. There is no hemorrhage or mass-effect. No acute infarction is seen.  No calvarial abnormality.      1. No acute intracranial abnormality is seen.              This report was finalized on 09/23/2021 12:00 by Dr. Jaime Morris MD.    XR Chest 1 View    Result Date: 9/23/2021  Frontal upright radiograph of the chest 9/23/2021 11:06 AM CDT  HISTORY: Heart palpitations  COMPARISON: None.  FINDINGS:  No lung consolidation. No pleural effusion or pneumothorax. The cardiomediastinal silhouette and pulmonary vascularity are within normal limits. The osseous structures and surrounding soft tissues demonstrate no acute abnormality.      1. No radiographic evidence of acute cardiopulmonary process.   This report was finalized on 09/23/2021 11:08 by Dr Joel Paulino, .    ED Course  ED Course as of Sep 23 1353   Thu Sep 23, 2021   1041 Mild elevated AST and ALT, WBC normal.  TSH normal, UA negative, magnesium normal.  COVID-19 not detected.  No evidence of arrhythmia while on the  monitor in the ED.  Given her intermittent episodes of palpitations, we will order outpatient cardiac monitor.  Etiology for symptoms uncertain, may be related to adverse reaction to vaccine.  We will order Holter monitor given the patient's palpitations she follow-up with her primary doctor as outpatient.    [AW]      ED Course User Index  [AW] Vishal Blanc MD                                           MDM  Number of Diagnoses or Management Options  Generalized weakness: new and requires workup  Nonintractable headache, unspecified chronicity pattern, unspecified headache type: new and requires workup  Palpitations: new and requires workup     Amount and/or Complexity of Data Reviewed  Clinical lab tests: reviewed  Tests in the radiology section of CPT®: reviewed  Tests in the medicine section of CPT®: reviewed    Risk of Complications, Morbidity, and/or Mortality  Presenting problems: high  Diagnostic procedures: high  Management options: high    Patient Progress  Patient progress: improved      Final diagnoses:   Palpitations   Generalized weakness   Nonintractable headache, unspecified chronicity pattern, unspecified headache type       ED Disposition  ED Disposition     ED Disposition Condition Comment    Discharge Stable           Fermín Rosas MD  [address] [id number] [phone]    Schedule an appointment as soon as possible for a visit            Medication List      No changes were made to your prescriptions during this visit.          Vishal Blanc MD  09/23/21 [id number]

## 2021-09-25 LAB
QT INTERVAL: 358 MS
QTC INTERVAL: 435 MS

## 2021-10-13 LAB
MAXIMAL PREDICTED HEART RATE: 174 BPM
STRESS TARGET HR: 148 BPM

## 2021-10-13 PROCEDURE — 93248 EXT ECG>7D<15D REV&INTERPJ: CPT | Performed by: INTERNAL MEDICINE

## 2021-10-19 ENCOUNTER — TELEPHONE (OUTPATIENT)
Dept: EMERGENCY DEPT | Facility: HOSPITAL | Age: 46
End: 2021-10-19

## 2021-10-20 ENCOUNTER — OFFICE VISIT (OUTPATIENT)
Dept: FAMILY MEDICINE CLINIC | Facility: CLINIC | Age: 46
End: 2021-10-20

## 2021-10-20 VITALS
BODY MASS INDEX: 29.25 KG/M2 | SYSTOLIC BLOOD PRESSURE: 120 MMHG | WEIGHT: 181.2 LBS | TEMPERATURE: 98 F | HEART RATE: 86 BPM | OXYGEN SATURATION: 99 % | DIASTOLIC BLOOD PRESSURE: 79 MMHG

## 2021-10-20 DIAGNOSIS — R00.2 PALPITATIONS: Primary | ICD-10-CM

## 2021-10-20 DIAGNOSIS — Z23 NEED FOR INFLUENZA VACCINATION: ICD-10-CM

## 2021-10-20 PROCEDURE — 90471 IMMUNIZATION ADMIN: CPT | Performed by: FAMILY MEDICINE

## 2021-10-20 PROCEDURE — 90686 IIV4 VACC NO PRSV 0.5 ML IM: CPT | Performed by: FAMILY MEDICINE

## 2021-10-20 PROCEDURE — 99213 OFFICE O/P EST LOW 20 MIN: CPT | Performed by: FAMILY MEDICINE

## 2021-10-20 NOTE — PROGRESS NOTES
Subjective   Екатерина Stovall is a 45 y.o. female.     45-year-old follow-up ER palpitations with right upper extremity paresthesias      The following portions of the patient's history were reviewed and updated as appropriate: allergies, current medications, past family history, past medical history, past social history, past surgical history and problem list.    Review of Systems   Respiratory: Negative for shortness of breath.    Cardiovascular: Positive for palpitations. Negative for chest pain.   Neurological: Negative for headaches.       Objective   Physical Exam  Vitals and nursing note reviewed.   Constitutional:       Appearance: Normal appearance.   Eyes:      Extraocular Movements: Extraocular movements intact.      Pupils: Pupils are equal, round, and reactive to light.   Cardiovascular:      Rate and Rhythm: Normal rate and regular rhythm.      Pulses: Normal pulses.      Heart sounds: Normal heart sounds.   Pulmonary:      Effort: Pulmonary effort is normal.      Breath sounds: Normal breath sounds.   Abdominal:      General: Abdomen is flat.      Palpations: Abdomen is soft.   Musculoskeletal:      Right lower leg: No edema.      Left lower leg: No edema.   Skin:     General: Skin is warm and dry.   Neurological:      General: No focal deficit present.      Mental Status: She is alert and oriented to person, place, and time.   Psychiatric:         Mood and Affect: Mood normal.         Behavior: Behavior normal.         Thought Content: Thought content normal.         Judgment: Judgment normal.         Assessment/Plan   Diagnoses and all orders for this visit:    1. Palpitations (Primary)  -     Ambulatory Referral to Cardiology    2. Need for influenza vaccination  -     FluLaval/Fluarix/Fluzone >6 Months ([id number])       Plan-stop phentermine, reduce Estrace 1 mg to every other day-cardiology consult-Holter monitor showed incomplete second-degree heart block-4 weeks CMP lipid panel-may need ultrasound  of abdomen

## 2021-10-21 ENCOUNTER — TELEMEDICINE (OUTPATIENT)
Dept: OBSTETRICS AND GYNECOLOGY | Facility: CLINIC | Age: 46
End: 2021-10-21

## 2021-10-21 DIAGNOSIS — E66.9 CLASS 1 OBESITY WITHOUT SERIOUS COMORBIDITY WITH BODY MASS INDEX (BMI) OF 32.0 TO 32.9 IN ADULT, UNSPECIFIED OBESITY TYPE: Primary | ICD-10-CM

## 2021-10-21 PROCEDURE — 99212 OFFICE O/P EST SF 10 MIN: CPT | Performed by: NURSE PRACTITIONER

## 2021-10-21 NOTE — PROGRESS NOTES
Patient seen via video visit today for weight check and refill of phentermine.  States she weighed 179 this morning for a total of 13 lbs since starting phentermine.  However patient reports that she had a dramatic response to her second Covid vaccine and they want to do some heart studies and have asked her to stop her phentermine until they have cleared her there.  Advised the patient that we would wait till she got cardiac clearance to restart it RTO in 4 weeks for follow-up or sooner as needed if still not cleared for phentermine we will discuss Saxenda at that time.

## 2021-10-22 ENCOUNTER — TELEPHONE (OUTPATIENT)
Dept: CARDIOLOGY | Facility: CLINIC | Age: 46
End: 2021-10-22

## 2021-11-12 RX ORDER — MONTELUKAST SODIUM 10 MG/1
10 TABLET ORAL
Qty: 90 TABLET | Refills: 3 | Status: SHIPPED | OUTPATIENT
Start: 2021-11-12 | End: 2022-03-05 | Stop reason: SDUPTHER

## 2021-11-12 NOTE — TELEPHONE ENCOUNTER
Rx Refill Note  Requested Prescriptions     Pending Prescriptions Disp Refills   • montelukast (SINGULAIR) 10 MG tablet 90 tablet 3     Sig: Take 1 tablet by mouth every night at bedtime.      Last office visit with prescribing clinician: 10/20/2021      Next office visit with prescribing clinician: 11/17/2021            Deisi Hilton MA  11/12/21, 15:24 CST

## 2021-11-17 ENCOUNTER — OFFICE VISIT (OUTPATIENT)
Dept: FAMILY MEDICINE CLINIC | Facility: CLINIC | Age: 46
End: 2021-11-17

## 2021-11-17 VITALS
BODY MASS INDEX: 29.7 KG/M2 | HEART RATE: 68 BPM | SYSTOLIC BLOOD PRESSURE: 115 MMHG | HEIGHT: 66 IN | OXYGEN SATURATION: 98 % | TEMPERATURE: 98 F | WEIGHT: 184.8 LBS | DIASTOLIC BLOOD PRESSURE: 76 MMHG

## 2021-11-17 DIAGNOSIS — E78.2 MIXED HYPERLIPIDEMIA: Primary | ICD-10-CM

## 2021-11-17 DIAGNOSIS — D75.1 POLYCYTHEMIA: ICD-10-CM

## 2021-11-17 PROCEDURE — 99213 OFFICE O/P EST LOW 20 MIN: CPT | Performed by: FAMILY MEDICINE

## 2021-11-17 NOTE — PATIENT INSTRUCTIONS
Exercising to Stay Healthy  To become healthy and stay healthy, it is recommended that you do moderate-intensity and vigorous-intensity exercise. You can tell that you are exercising at a moderate intensity if your heart starts beating faster and you start breathing faster but can still hold a conversation. You can tell that you are exercising at a vigorous intensity if you are breathing much harder and faster and cannot hold a conversation while exercising.  Exercising regularly is important. It has many health benefits, such as:  · Improving overall fitness, flexibility, and endurance.  · Increasing bone density.  · Helping with weight control.  · Decreasing body fat.  · Increasing muscle strength.  · Reducing stress and tension.  · Improving overall health.  How often should I exercise?  Choose an activity that you enjoy, and set realistic goals. Your health care provider can help you make an activity plan that works for you.  Exercise regularly as told by your health care provider. This may include:  · Doing strength training two times a week, such as:  ? Lifting weights.  ? Using resistance bands.  ? Push-ups.  ? Sit-ups.  ? Yoga.  · Doing a certain intensity of exercise for a given amount of time. Choose from these options:  ? A total of 150 minutes of moderate-intensity exercise every week.  ? A total of 75 minutes of vigorous-intensity exercise every week.  ? A mix of moderate-intensity and vigorous-intensity exercise every week.  Children, pregnant women, people who have not exercised regularly, people who are overweight, and older adults may need to talk with a health care provider about what activities are safe to do. If you have a medical condition, be sure to talk with your health care provider before you start a new exercise program.  What are some exercise ideas?  Moderate-intensity exercise ideas include:  · Walking 1 mile (1.6 km) in about 15  minutes.  · Biking.  · Hiking.  · Golfing.  · Dancing.  · Water aerobics.  Vigorous-intensity exercise ideas include:  · Walking 4.5 miles (7.2 km) or more in about 1 hour.  · Jogging or running 5 miles (8 km) in about 1 hour.  · Biking 10 miles (16.1 km) or more in about 1 hour.  · Lap swimming.  · Roller-skating or in-line skating.  · Cross-country skiing.  · Vigorous competitive sports, such as football, basketball, and soccer.  · Jumping rope.  · Aerobic dancing.  What are some everyday activities that can help me to get exercise?  · Yard work, such as:  ? Pushing a .  ? Raking and bagging leaves.  · Washing your car.  · Pushing a stroller.  · Shoveling snow.  · Gardening.  · Washing windows or floors.  How can I be more active in my day-to-day activities?  · Use stairs instead of an elevator.  · Take a walk during your lunch break.  · If you drive, park your car farther away from your work or school.  · If you take public transportation, get off one stop early and walk the rest of the way.  · Stand up or walk around during all of your indoor phone calls.  · Get up, stretch, and walk around every 30 minutes throughout the day.  · Enjoy exercise with a friend. Support to continue exercising will help you keep a regular routine of activity.  What guidelines can I follow while exercising?  · Before you start a new exercise program, talk with your health care provider.  · Do not exercise so much that you hurt yourself, feel dizzy, or get very short of breath.  · Wear comfortable clothes and wear shoes with good support.  · Drink plenty of water while you exercise to prevent dehydration or heat stroke.  · Work out until your breathing and your heartbeat get faster.  Where to find more information  · [place] Department of Health and Human Services: www.hhs.gov  · Centers for Disease Control and Prevention (CDC): www.cdc.gov  Summary  · Exercising regularly is important. It will improve your overall fitness,  flexibility, and endurance.  · Regular exercise also will improve your overall health. It can help you control your weight, reduce stress, and improve your bone density.  · Do not exercise so much that you hurt yourself, feel dizzy, or get very short of breath.  · Before you start a new exercise program, talk with your health care provider.  This information is not intended to replace advice given to you by your health care provider. Make sure you discuss any questions you have with your health care provider.  Document Revised: 11/30/2018 Document Reviewed: 11/08/2018  Elsevier Patient Education © 2021 [employer]  Mediterranean Diet  A Mediterranean diet refers to food and lifestyle choices that are based on the traditions of countries located on the [place]. This way of eating has been shown to help prevent certain conditions and improve outcomes for people who have chronic diseases, like kidney disease and heart disease.  What are tips for following this plan?  Lifestyle  · Cook and eat meals together with your family, when possible.  · Drink enough fluid to keep your urine clear or pale yellow.  · Be physically active every day. This includes:  ? Aerobic exercise like running or swimming.  ? Leisure activities like gardening, walking, or housework.  · Get 7-8 hours of sleep each night.  · If recommended by your health care provider, drink red wine in moderation. This means 1 glass a day for nonpregnant women and 2 glasses a day for men. A glass of wine equals 5 oz (150 mL).  Reading food labels    · Check the serving size of packaged foods. For foods such as rice and pasta, the serving size refers to the amount of cooked product, not dry.  · Check the total fat in packaged foods. Avoid foods that have saturated fat or trans fats.  · Check the ingredients list for added sugars, such as corn syrup.    Shopping  · At the grocery store, buy most of your food from the areas near the walls of the store.  This includes:  ? Fresh fruits and vegetables (produce).  ? Grains, beans, nuts, and seeds. Some of these may be available in unpackaged forms or large amounts (in bulk).  ? Fresh seafood.  ? Poultry and eggs.  ? Low-fat dairy products.  · Buy whole ingredients instead of prepackaged foods.  · Buy fresh fruits and vegetables in-season from local farmers markets.  · Buy frozen fruits and vegetables in resealable bags.  · If you do not have access to quality fresh seafood, buy precooked frozen shrimp or canned fish, such as tuna, salmon, or sardines.  · Buy small amounts of raw or cooked vegetables, salads, or olives from the deli or salad bar at your store.  · Stock your pantry so you always have certain foods on hand, such as olive oil, canned tuna, canned tomatoes, rice, pasta, and beans.  Cooking  · Cook foods with extra-virgin olive oil instead of using butter or other vegetable oils.  · Have meat as a side dish, and have vegetables or grains as your main dish. This means having meat in small portions or adding small amounts of meat to foods like pasta or stew.  · Use beans or vegetables instead of meat in common dishes like chili or lasagna.  · [place] with different cooking methods. Try roasting or broiling vegetables instead of steaming or sautéeing them.  · Add frozen vegetables to soups, stews, pasta, or rice.  · Add nuts or seeds for added healthy fat at each meal. You can add these to yogurt, salads, or vegetable dishes.  · Marinate fish or vegetables using olive oil, lemon juice, garlic, and fresh herbs.  Meal planning    · Plan to eat 1 vegetarian meal one day each week. Try to work up to 2 vegetarian meals, if possible.  · Eat seafood 2 or more times a week.  · Have healthy snacks readily available, such as:  ? Vegetable sticks with hummus.  ? Greek yogurt.  ? Fruit and nut trail mix.  · Eat balanced meals throughout the week. This includes:  ? Fruit: 2-3 servings a day  ? Vegetables: 4-5 servings a  day  ? Low-fat dairy: 2 servings a day  ? Fish, poultry, or lean meat: 1 serving a day  ? Beans and legumes: 2 or more servings a week  ? Nuts and seeds: 1-2 servings a day  ? Whole grains: 6-8 servings a day  ? Extra-virgin olive oil: 3-4 servings a day  · Limit red meat and sweets to only a few servings a month    What are my food choices?  · Mediterranean diet  ? Recommended  § Grains: Whole-grain pasta. Brown rice. Bulgar wheat. Polenta. Couscous. Whole-wheat bread. Oatmeal. Quinoa.  § Vegetables: Artichokes. Beets. Broccoli. Cabbage. Carrots. Eggplant. Green beans. Chard. Kale. Spinach. Onions. Leeks. Peas. Squash. Tomatoes. Peppers. Radishes.  § Fruits: Apples. Apricots. Avocado. Berries. Bananas. Cherries. Dates. Figs. Grapes. Darian. Melon. Oranges. Peaches. Plums. Pomegranate.  § Meats and other protein foods: Beans. Almonds. Sunflower seeds. Pine nuts. Peanuts. Cod. [place]. Scallops. Shrimp. Tuna. Tilapia. Clams. Oysters. Eggs.  § Dairy: Low-fat milk. Cheese. Greek yogurt.  § Beverages: Water. Red wine. Herbal tea.  § Fats and oils: Extra virgin olive oil. Avocado oil. Grape seed oil.  § Sweets and desserts: Greek yogurt with honey. Baked apples. Poached pears. Trail mix.  § Seasoning and other foods: Basil. Cilantro. Coriander. Cumin. Mint. Parsley. Augustine. Rosemary. Tarragon. Garlic. Oregano. Thyme. Pepper. Balsalmic vinegar. Tahini. Hummus. Tomato sauce. Olives. Mushrooms.  ? Limit these  § Grains: Prepackaged pasta or rice dishes. Prepackaged cereal with added sugar.  § Vegetables: Deep fried potatoes (french fries).  § Fruits: Fruit canned in syrup.  § Meats and other protein foods: Beef. Pork. Lamb. Poultry with skin. Hot dogs. [place].  § Dairy: Ice cream. Sour cream. Whole milk.  § Beverages: Juice. Sugar-sweetened soft drinks. Beer. Liquor and spirits.  § Fats and oils: Butter. Canola oil. Vegetable oil. Beef fat (tallow). Lard.  § Sweets and desserts: Cookies. Cakes. Pies. Candy.  § Seasoning and  other foods: Mayonnaise. Premade sauces and marinades.  The items listed may not be a complete list. Talk with your dietitian about what dietary choices are right for you.  Summary  · The Mediterranean diet includes both food and lifestyle choices.  · Eat a variety of fresh fruits and vegetables, beans, nuts, seeds, and whole grains.  · Limit the amount of red meat and sweets that you eat.  · Talk with your health care provider about whether it is safe for you to drink red wine in moderation. This means 1 glass a day for nonpregnant women and 2 glasses a day for men. A glass of wine equals 5 oz (150 mL).  This information is not intended to replace advice given to you by your health care provider. Make sure you discuss any questions you have with your health care provider.  Document Revised: 08/17/2017 Document Reviewed: 08/10/2017  Elsevier Patient Education © 2020 [employer] Inc.

## 2021-11-17 NOTE — PROGRESS NOTES
Subjective   Екатерина Stovall is a 45 y.o. female.     45-year-old female with the liver function studies and Mobitz type I second-degree heart block      The following portions of the patient's history were reviewed and updated as appropriate: allergies, current medications, past family history, past medical history, past social history, past surgical history and problem list.    Review of Systems   Respiratory: Negative for shortness of breath.    Cardiovascular: Positive for palpitations. Negative for chest pain and leg swelling.        Seeing cardiologist in December   Gastrointestinal: Negative for abdominal distention and abdominal pain.       Objective   Physical Exam  Vitals and nursing note reviewed.   Constitutional:       Appearance: Normal appearance.   Cardiovascular:      Rate and Rhythm: Normal rate and regular rhythm.      Pulses: Normal pulses.      Heart sounds: Normal heart sounds.   Pulmonary:      Effort: Pulmonary effort is normal.      Breath sounds: Normal breath sounds.   Abdominal:      Palpations: There is no mass.      Tenderness: There is no abdominal tenderness.   Musculoskeletal:      Right lower leg: No edema.      Left lower leg: No edema.   Skin:     General: Skin is warm and dry.   Neurological:      General: No focal deficit present.      Mental Status: She is alert and oriented to person, place, and time.   Psychiatric:         Mood and Affect: Mood normal.         Behavior: Behavior normal.         Assessment/Plan   Diagnoses and all orders for this visit:    1. Mixed hyperlipidemia (Primary)  -     Comprehensive Metabolic Panel  -     Lipid Panel    2. Polycythemia  -     CBC & Differential         Plan above-may need ultrasound without elevated test are still present

## 2021-11-18 DIAGNOSIS — E78.2 MIXED HYPERLIPIDEMIA: Primary | ICD-10-CM

## 2021-11-18 DIAGNOSIS — R74.8 ELEVATED LIVER ENZYMES: ICD-10-CM

## 2021-11-18 LAB
ALBUMIN SERPL-MCNC: 4.7 G/DL (ref 3.5–5.2)
ALBUMIN/GLOB SERPL: 2.6 G/DL
ALP SERPL-CCNC: 72 U/L (ref 39–117)
ALT SERPL-CCNC: 57 U/L (ref 1–33)
AST SERPL-CCNC: 45 U/L (ref 1–32)
BASOPHILS # BLD AUTO: 0.07 10*3/MM3 (ref 0–0.2)
BASOPHILS NFR BLD AUTO: 1.6 % (ref 0–1.5)
BILIRUB SERPL-MCNC: 0.7 MG/DL (ref 0–1.2)
BUN SERPL-MCNC: 10 MG/DL (ref 6–20)
BUN/CREAT SERPL: 13.3 (ref 7–25)
CALCIUM SERPL-MCNC: 10 MG/DL (ref 8.6–10.5)
CHLORIDE SERPL-SCNC: 101 MMOL/L (ref 98–107)
CHOLEST SERPL-MCNC: 259 MG/DL (ref 0–200)
CO2 SERPL-SCNC: 29.4 MMOL/L (ref 22–29)
CREAT SERPL-MCNC: 0.75 MG/DL (ref 0.57–1)
EOSINOPHIL # BLD AUTO: 0.1 10*3/MM3 (ref 0–0.4)
EOSINOPHIL NFR BLD AUTO: 2.2 % (ref 0.3–6.2)
ERYTHROCYTE [DISTWIDTH] IN BLOOD BY AUTOMATED COUNT: 12.8 % (ref 12.3–15.4)
GLOBULIN SER CALC-MCNC: 1.8 GM/DL
GLUCOSE SERPL-MCNC: 93 MG/DL (ref 65–99)
HCT VFR BLD AUTO: 44.6 % (ref 34–46.6)
HDLC SERPL-MCNC: 42 MG/DL (ref 40–60)
HGB BLD-MCNC: 14.7 G/DL (ref 12–15.9)
IMM GRANULOCYTES # BLD AUTO: 0.01 10*3/MM3 (ref 0–0.05)
IMM GRANULOCYTES NFR BLD AUTO: 0.2 % (ref 0–0.5)
LDLC SERPL CALC-MCNC: 173 MG/DL (ref 0–100)
LYMPHOCYTES # BLD AUTO: 1.15 10*3/MM3 (ref 0.7–3.1)
LYMPHOCYTES NFR BLD AUTO: 25.6 % (ref 19.6–45.3)
MCH RBC QN AUTO: 29.1 PG (ref 26.6–33)
MCHC RBC AUTO-ENTMCNC: 33 G/DL (ref 31.5–35.7)
MCV RBC AUTO: 88.1 FL (ref 79–97)
MONOCYTES # BLD AUTO: 0.55 10*3/MM3 (ref 0.1–0.9)
MONOCYTES NFR BLD AUTO: 12.2 % (ref 5–12)
NEUTROPHILS # BLD AUTO: 2.62 10*3/MM3 (ref 1.7–7)
NEUTROPHILS NFR BLD AUTO: 58.2 % (ref 42.7–76)
NRBC BLD AUTO-RTO: 0 /100 WBC (ref 0–0.2)
PLATELET # BLD AUTO: 275 10*3/MM3 (ref 140–450)
POTASSIUM SERPL-SCNC: 4.6 MMOL/L (ref 3.5–5.2)
PROT SERPL-MCNC: 6.5 G/DL (ref 6–8.5)
RBC # BLD AUTO: 5.06 10*6/MM3 (ref 3.77–5.28)
SODIUM SERPL-SCNC: 140 MMOL/L (ref 136–145)
TRIGL SERPL-MCNC: 232 MG/DL (ref 0–150)
VLDLC SERPL CALC-MCNC: 44 MG/DL (ref 5–40)
WBC # BLD AUTO: 4.5 10*3/MM3 (ref 3.4–10.8)

## 2021-11-18 RX ORDER — ATORVASTATIN CALCIUM 40 MG/1
40 TABLET, FILM COATED ORAL NIGHTLY
Qty: 90 TABLET | Refills: 3 | Status: SHIPPED | OUTPATIENT
Start: 2021-11-18 | End: 2022-11-21

## 2021-11-22 DIAGNOSIS — N28.89 LEFT KIDNEY MASS: Primary | ICD-10-CM

## 2021-12-07 ENCOUNTER — HOSPITAL ENCOUNTER (OUTPATIENT)
Dept: CT IMAGING | Facility: HOSPITAL | Age: 46
Discharge: HOME | End: 2021-12-07
Admitting: FAMILY MEDICINE

## 2021-12-07 DIAGNOSIS — N28.89 LEFT KIDNEY MASS: ICD-10-CM

## 2021-12-07 LAB — CREAT BLDA-MCNC: 0.8 MG/DL (ref 0.6–1.3)

## 2021-12-07 PROCEDURE — 82565 ASSAY OF CREATININE: CPT

## 2021-12-07 PROCEDURE — 0 IOPAMIDOL PER 1 ML: Performed by: FAMILY MEDICINE

## 2021-12-07 PROCEDURE — 74170 CT ABD WO CNTRST FLWD CNTRST: CPT

## 2021-12-07 RX ADMIN — IOPAMIDOL 100 ML: 755 INJECTION, SOLUTION INTRAVENOUS at 09:15

## 2021-12-23 ENCOUNTER — OFFICE VISIT (OUTPATIENT)
Dept: CARDIOLOGY | Facility: CLINIC | Age: 46
End: 2021-12-23

## 2021-12-23 VITALS
WEIGHT: 187 LBS | HEIGHT: 66 IN | DIASTOLIC BLOOD PRESSURE: 78 MMHG | HEART RATE: 55 BPM | BODY MASS INDEX: 30.05 KG/M2 | OXYGEN SATURATION: 99 % | SYSTOLIC BLOOD PRESSURE: 125 MMHG

## 2021-12-23 DIAGNOSIS — R00.2 PALPITATIONS: Primary | ICD-10-CM

## 2021-12-23 DIAGNOSIS — R53.83 EASY FATIGABILITY: ICD-10-CM

## 2021-12-23 DIAGNOSIS — R06.09 DOE (DYSPNEA ON EXERTION): ICD-10-CM

## 2021-12-23 PROCEDURE — 99204 OFFICE O/P NEW MOD 45 MIN: CPT | Performed by: INTERNAL MEDICINE

## 2021-12-23 PROCEDURE — 93000 ELECTROCARDIOGRAM COMPLETE: CPT | Performed by: INTERNAL MEDICINE

## 2021-12-23 RX ORDER — CHOLECALCIFEROL (VITAMIN D3) 1250 MCG
5000 CAPSULE ORAL DAILY
COMMUNITY
Start: 2021-08-19

## 2021-12-23 NOTE — PROGRESS NOTES
"    [facility] - CARDIOLOGY  New Patient Initial Outpatient Evaluation    Primary Care Physician: Fermín Rosas MD    Subjective     Chief Complaint: palpitations    History of Present Illness    44yo reported getting second COVID vaccine 9/20 then came to ER 9/23 for acute onset SOA, lightheaded, and numbness in the right arm and right face.  Associated with palpitations characterized as \"heart racing.\"  Since then, notices dyspnea, dizzy when walking upstairs.  Prior to 9/20, was walking 6 miles a day without problems.  Now still walking 5 miles, but at slower pace.  She states the change is due to getting SOA, which is then followed by palpitations and light-headedness.  These don't make her stop, so continues walking.  Symptoms continue but she is able to ignore them and keep going; symptoms don't worsen as she continues to walk.    Prior to all this, was very ative walking, doing BARRE, and working out with [employer]. She has make subtle improvements since these symptoms started.  No syncope in this period of time, but has had multiple syncopal episodes in the past. Two one-time dose of phentermine about two weeks before an event in '08, when she went to ER after syncope - was walking, then got 'itchy palms' followed by seating profusely, and later passed out. Was told in ER she was very dehydrated.  In Jan '09, after working out, had visual changes looking at a computer and then passed out.  Was transferred to [facility] and told after what sounds like a negative work-up that she had 'basilar migraines.' Was given topamax, which she took for awhile, without any changes. Had hormonal supplement stopped around then as well.      This year, she was started again on phentermine this past August - also had estrace increased at that time - by Lauren Lincoln on 8/24/21 during telemedicine visit        Review of Systems   Constitutional: Positive for malaise/fatigue.   Cardiovascular: Positive for irregular " heartbeat and palpitations. Negative for chest pain, claudication, dyspnea on exertion, leg swelling, near-syncope, orthopnea, paroxysmal nocturnal dyspnea and syncope.   Respiratory: Negative for shortness of breath.    Hematologic/Lymphatic: Does not bruise/bleed easily.   Otherwise complete ROS reviewed and negative except as mentioned in the HPI.      Past Medical History:   Past Medical History:   Diagnosis Date   • Anxiety    • Arrhythmia    • Hyperlipidemia    • Seasonal allergies        Past Surgical History:  Past Surgical History:   Procedure Laterality Date   • APPENDECTOMY     • LAPAROSCOPIC SALPINGOOPHERECTOMY Bilateral    • OOPHORECTOMY     • TOTAL ABDOMINAL HYSTERECTOMY      RIVKA w/ BSO due to pain caused by varicose veins and heavy bleeding.   • TUBAL ABDOMINAL LIGATION         Family History: family history includes Breast cancer in her maternal aunt, maternal grandmother, and paternal grandmother; Diabetes in her father; Heart disease in her father; Hyperlipidemia in her father; Hypertension in her father; Ovarian cancer in her mother; Uterine cancer in her maternal aunt.    Social History:  reports that she has never smoked. She has never used smokeless tobacco. She reports previous alcohol use. She reports previous drug use.    Medications:  Prior to Admission medications    Medication Sig Start Date End Date Taking? Authorizing Provider   APPLE CIDER VINEGAR PO Take  by mouth.   Yes Petr Olson MD   Ascorbic Acid (VITAMIN C PO) Take  by mouth.   Yes Petr Olson MD   atorvastatin (Lipitor) 40 MG tablet Take 1 tablet by mouth Every Night. 11/18/21  Yes Fermín Rosas MD   Cholecalciferol (Vitamin D3) 1.25 MG (15391 UT) capsule  8/19/21  Yes Petr Olson MD   escitalopram (LEXAPRO) 10 MG tablet Take 10 mg by mouth Daily. 3/25/21  Yes Petr Olson MD   estradiol (Estrace) 1 MG tablet Take 1 tablet by mouth Daily. 8/24/21  Yes Lauren Lincoln APRN  "  levocetirizine (XYZAL) 5 MG tablet Take 5 mg by mouth Every Evening.   Yes Provider, MD Petr   montelukast (SINGULAIR) 10 MG tablet Take 1 tablet by mouth every night at bedtime. 11/12/21  Yes Iza Villela APRN   Multiple Vitamins-Minerals (ZINC PO) Take  by mouth.   Yes Provider, MD Petr   Probiotic Product (Align) 4 MG capsule Take  by mouth.   Yes Provider, MD Petr     Allergies:  No Known Allergies    Objective     Vital Signs: /78   Pulse 55   Ht 167.6 cm (66\")   Wt 84.8 kg (187 lb)   SpO2 99%   BMI 30.18 kg/m²     Vitals and nursing note reviewed.   Constitutional:       General: Not in acute distress.     Appearance: Not in distress.   Neck:      Vascular: No JVD or JVR. JVD normal.   Pulmonary:      Effort: Pulmonary effort is normal.      Breath sounds: Normal breath sounds.   Cardiovascular:      Normal rate. Regular rhythm.      Murmurs: There is no murmur.      No gallop. No click.   Pulses:     Intact distal pulses.   Edema:     Peripheral edema absent.   Musculoskeletal:         General: No tenderness. Skin:     General: Skin is warm and dry.   Neurological:      Mental Status: Alert, oriented to person, place, and time and oriented to person, place and time.         Results Reviewed:      ECG 12 Lead    Date/Time: 12/23/2021 10:18 AM  Performed by: Satnam Gotti MD  Authorized by: Satnam Gotti MD   Comparison: compared with previous ECG from 9/23/2021  Comparison to previous ECG: T wave inversions in the anterior leads are no longer present, voltage criteria for LVH is no longer present  Rhythm: sinus bradycardia  Rate: bradycardic  BPM: 55    Clinical impression: normal ECG              Lab Results   Component Value Date    TRIG 232 (H) 11/17/2021    HDL 42 11/17/2021    VLDL 44 (H) 11/17/2021    LDLHDL 3.32 06/11/2021     Lab Results   Component Value Date    HGBA1C 5.50 06/11/2021            Assessment / Plan        Problem List Items Addressed This " Visit     None      Visit Diagnoses     Palpitations    -  Primary    Relevant Orders    Adult Transthoracic Echo Complete w/ Color, Spectral and Contrast if necessary per protocol    Easy fatigability        KAUR (dyspnea on exertion)            Recommendations and plans: Since patient had Covid, she has noticed a reduction in her exercise capacity.  Palpitations are not associated symptom of this for her, but previous work-up with a Holter monitor was not concerning.  Though she certainly does not have any signs on exam to suggest congestive heart failure/myocarditis as a result of Covid, I would like to check an echocardiogram to ensure structural integrity of the heart.    I do not think we need any further arrhythmia monitoring, nor any ischemic evaluation as the patient is noticing that, though still reduced from her pre-Covid exercise capacity, she is gradually improving and this would not be the case if her limitations were from obstructive coronary disease.    We will touch base with her once the echocardiogram is been completed, and if there are no concerning findings there, the no further cardiology follow-up or testing will be recommended.    Satnam Gotti MD   12/23/21   10:45 CST

## 2021-12-28 ENCOUNTER — PATIENT ROUNDING (BHMG ONLY) (OUTPATIENT)
Dept: CARDIOLOGY | Facility: CLINIC | Age: 46
End: 2021-12-28

## 2021-12-28 NOTE — PROGRESS NOTES
December 28, 2021    Hello, may I speak with Екатерина Stovall?    My name is Haritha Hoyos, REMI    I am  with [facility]  [address]  [place] [id number] [phone].    Before we get started may I verify your date of birth? 1975    I am calling to officially welcome you to our practice and ask about your recent visit. Is this a good time to talk? YES     Tell me about your visit with us. What things went well?  The visit was great.  Dr. Gotti was extremely thorough and did a great job explaining everything to me.     We're always looking for ways to make our patients' experiences even better. Do you have recommendations on ways we may improve?  NO    Overall were you satisfied with your first visit to our practice? YES        I appreciate you taking the time to speak with me today. Is there anything else I can do for you?  NO      Thank you, and have a great day.

## 2022-01-03 DIAGNOSIS — N95.1 MENOPAUSAL STATE: ICD-10-CM

## 2022-01-04 RX ORDER — ESTRADIOL 1 MG/1
1 TABLET ORAL DAILY
Qty: 30 TABLET | Refills: 5 | Status: SHIPPED | OUTPATIENT
Start: 2022-01-04 | End: 2022-06-13 | Stop reason: SDUPTHER

## 2022-01-14 ENCOUNTER — PRE-ADMISSION TESTING (OUTPATIENT)
Dept: PREADMISSION TESTING | Facility: HOSPITAL | Age: 47
End: 2022-01-14

## 2022-01-14 VITALS
HEIGHT: 66 IN | WEIGHT: 189.82 LBS | RESPIRATION RATE: 18 BRPM | SYSTOLIC BLOOD PRESSURE: 136 MMHG | HEART RATE: 70 BPM | BODY MASS INDEX: 30.51 KG/M2 | DIASTOLIC BLOOD PRESSURE: 63 MMHG | OXYGEN SATURATION: 100 %

## 2022-01-14 LAB
ANION GAP SERPL CALCULATED.3IONS-SCNC: 6 MMOL/L (ref 5–15)
BUN SERPL-MCNC: 13 MG/DL (ref 6–20)
BUN/CREAT SERPL: 18.3 (ref 7–25)
CALCIUM SPEC-SCNC: 9.7 MG/DL (ref 8.6–10.5)
CHLORIDE SERPL-SCNC: 104 MMOL/L (ref 98–107)
CO2 SERPL-SCNC: 31 MMOL/L (ref 22–29)
CREAT SERPL-MCNC: 0.71 MG/DL (ref 0.57–1)
DEPRECATED RDW RBC AUTO: 38.4 FL (ref 37–54)
ERYTHROCYTE [DISTWIDTH] IN BLOOD BY AUTOMATED COUNT: 12.4 % (ref 12.3–15.4)
GFR SERPL CREATININE-BSD FRML MDRD: 88 ML/MIN/1.73
GLUCOSE SERPL-MCNC: 101 MG/DL (ref 65–99)
HCT VFR BLD AUTO: 41.3 % (ref 34–46.6)
HGB BLD-MCNC: 13.6 G/DL (ref 12–15.9)
MCH RBC QN AUTO: 28.2 PG (ref 26.6–33)
MCHC RBC AUTO-ENTMCNC: 32.9 G/DL (ref 31.5–35.7)
MCV RBC AUTO: 85.7 FL (ref 79–97)
PLATELET # BLD AUTO: 261 10*3/MM3 (ref 140–450)
PMV BLD AUTO: 9.2 FL (ref 6–12)
POTASSIUM SERPL-SCNC: 4.3 MMOL/L (ref 3.5–5.2)
RBC # BLD AUTO: 4.82 10*6/MM3 (ref 3.77–5.28)
SODIUM SERPL-SCNC: 141 MMOL/L (ref 136–145)
WBC NRBC COR # BLD: 4.1 10*3/MM3 (ref 3.4–10.8)

## 2022-01-14 PROCEDURE — 85027 COMPLETE CBC AUTOMATED: CPT

## 2022-01-14 PROCEDURE — 36415 COLL VENOUS BLD VENIPUNCTURE: CPT

## 2022-01-14 PROCEDURE — 80048 BASIC METABOLIC PNL TOTAL CA: CPT

## 2022-01-14 NOTE — DISCHARGE INSTRUCTIONS
DAY OF SURGERY INSTRUCTIONS          ARRIVAL TIME: AS DIRECTED BY OFFICE    YOU MAY TAKE THE FOLLOWING MEDICATION(S) THE MORNING OF SURGERY WITH A SIP OF WATER: ***      ALL OTHER HOME MEDICATION CHECK WITH YOUR PHYSICIAN (ask your health care provider about changing or stopping your regular medicines, especially if you are taking diabetes medicines or blood thinners)      DO NOT TAKE ANY ERECTILE DYSFUNCTION MEDICATIONS (EX: CIALIS, VIAGRA) 24 HOURS PRIOR TO SURGERY                      MANAGING PAIN AFTER SURGERY    We know you are probably wondering what your pain will be like after surgery.  Following surgery it is unrealistic to expect you will not have pain.   Pain is how our bodies let us know that something is wrong or cautions us to be careful.  That said, our goal is to make your pain tolerable.    Methods we may use to treat your pain include (oral or IV medications, PCAs, epidurals, nerve blocks, etc.)   While some procedures require IV pain medications for a short time after surgery, transitioning to pain medications by mouth allows for better management of pain.   Your nurse will encourage you to take oral pain medications whenever possible.  IV medications work almost immediately, but only last a short while.  Taking medications by mouth allows for a more constant level of medication in your blood stream for a longer period of time.      Once your pain is out of control it is harder to get back under control.  It is important you are aware when your next dose of pain medication is due.  If you are admitted, your nurse may write the time of your next dose on the white board in your room to help you remember.      We are interested in your pain and encourage you to inform us about aggravating factors during your visit.   Many times a simple repositioning every few hours can make a big difference.    If your physician says it is okay, do not let your pain prevent you from getting out of bed. Be sure to  call your nurse for assistance prior to getting up so you do not fall.      Before surgery, please decide your tolerable pain goal.  These faces help describe the pain ratings we use on a 0-10 scale.   Be prepared to tell us your goal and whether or not you take pain or anxiety medications at home.          BEFORE YOU COME TO THE HOSPITAL  (Pre-op instructions)  • Do not eat, drink, smoke or chew gum after midnight the night before surgery.  This also includes no mints.  • Morning of surgery take only the medicines you have been instructed with a sip of water unless otherwise instructed  by your physician.  • Do not shave, wear makeup or dark nail polish.  • Remove all jewelry including rings.  • Leave anything you consider valuable at home.  • Leave your suitcase in the car until after your surgery.  • Bring the following with you if applicable:  o Picture ID and insurance, Medicare or Medicaid cards  o Co-pay/deductible required by insurance (cash, check, credit card)  o Copy of advance directive, living will or power-of- documents if not brought to pre-work  o CPAP or BIPAP mask and tubing  o Relaxation aids ( book, magazine), etc.  o Hearing aids                        ON THE DAY OF SURGERY  · On the day of surgery check in at registration located at the main entrance of the hospital. Only one family member or friend are allowed per patient.  ? You will be registered and given a beeper with instructions where to wait in the main lobby.  ? When your beeper lights up and vibrates a member of the Outpatient Surgery staff will meet you at the double doors under the stair steps and escort you to your preoperative room.   · You may have cloth compression devices placed on your legs. These help to prevent blood clots and reduce swelling in your legs.  · An IV may be inserted into one of your veins.  · In the operating room, you may be given one or more of the following:  ? A medicine to help you relax  "(sedative).  ? A medicine to numb the area (local anesthetic).  ? A medicine to make you fall asleep (general anesthetic).  ? A medicine that is injected into an area of your body to numb everything below the injection site (regional anesthetic).  · Your surgical site will be marked or identified.  · You may be given an antibiotic through your IV to help prevent infection.  Contact a health care provider if you:  · Develop a fever of more than 100.4°F (38°C) or other feelings of illness during the 48 hours before your surgery.  · Have symptoms that get worse.  Have questions or concerns about your surgery    General Anesthesia/Surgery, Adult  General anesthesia is the use of medicines to make a person \"go to sleep\" (unconscious) for a medical procedure. General anesthesia must be used for certain procedures, and is often recommended for procedures that:  · Last a long time.  · Require you to be still or in an unusual position.  · Are major and can cause blood loss.  The medicines used for general anesthesia are called general anesthetics. As well as making you unconscious for a certain amount of time, these medicines:  · Prevent pain.  · Control your blood pressure.  · Relax your muscles.  Tell a health care provider about:  · Any allergies you have.  · All medicines you are taking, including vitamins, herbs, eye drops, creams, and over-the-counter medicines.  · Any problems you or family members have had with anesthetic medicines.  · Types of anesthetics you have had in the past.  · Any blood disorders you have.  · Any surgeries you have had.  · Any medical conditions you have.  · Any recent upper respiratory, chest, or ear infections.  · Any history of:  ? Heart or lung conditions, such as heart failure, sleep apnea, asthma, or chronic obstructive pulmonary disease (COPD).  ?  service.  ? Depression or anxiety.  · Any tobacco or drug use, including marijuana or alcohol use.  · Whether you are pregnant or " may be pregnant.  What are the risks?  Generally, this is a safe procedure. However, problems may occur, including:  · Allergic reaction.  · Lung and heart problems.  · Inhaling food or liquid from the stomach into the lungs (aspiration).  · Nerve injury.  · Air in the bloodstream, which can lead to stroke.  · Extreme agitation or confusion (delirium) when you wake up from the anesthetic.  · Waking up during your procedure and being unable to move. This is rare.  These problems are more likely to develop if you are having a major surgery or if you have an advanced or serious medical condition. You can prevent some of these complications by answering all of your health care provider's questions thoroughly and by following all instructions before your procedure.  General anesthesia can cause side effects, including:  · Nausea or vomiting.  · A sore throat from the breathing tube.  · Hoarseness.  · Wheezing or coughing.  · Shaking chills.  · Tiredness.  · Body aches.  · Anxiety.  · Sleepiness or drowsiness.  · Confusion or agitation.  RISKS AND COMPLICATIONS OF SURGERY  Your health care provider will discuss possible risks and complications with you before surgery. Common risks and complications include:    · Problems due to the use of anesthetics.  · Blood loss and replacement (does not apply to minor surgical procedures).  · Temporary increase in pain due to surgery.  · Uncorrected pain or problems that the surgery was meant to correct.  · Infection.  · New damage.    What happens before the procedure?    Medicines  Ask your health care provider about:  · Changing or stopping your regular medicines. This is especially important if you are taking diabetes medicines or blood thinners.  · Taking medicines such as aspirin and ibuprofen. These medicines can thin your blood. Do not take these medicines unless your health care provider tells you to take them.  · Taking over-the-counter medicines, vitamins, herbs, and  supplements. Do not take these during the week before your procedure unless your health care provider approves them.  General instructions  · Starting 3-6 weeks before the procedure, do not use any products that contain nicotine or tobacco, such as cigarettes and e-cigarettes. If you need help quitting, ask your health care provider.  · If you brush your teeth on the morning of the procedure, make sure to spit out all of the toothpaste.  · Tell your health care provider if you become ill or develop a cold, cough, or fever.  · If instructed by your health care provider, bring your sleep apnea device with you on the day of your surgery (if applicable).  · Ask your health care provider if you will be going home the same day, the following day, or after a longer hospital stay.  ? Plan to have someone take you home from the hospital or clinic.  ? Plan to have a responsible adult care for you for at least 24 hours after you leave the hospital or clinic. This is important.  What happens during the procedure?  · You will be given anesthetics through both of the following:  ? A mask placed over your nose and mouth.  ? An IV in one of your veins.  · You may receive a medicine to help you relax (sedative).  · After you are unconscious, a breathing tube may be inserted down your throat to help you breathe. This will be removed before you wake up.  · An anesthesia specialist will stay with you throughout your procedure. He or she will:  ? Keep you comfortable and safe by continuing to give you medicines and adjusting the amount of medicine that you get.  ? Monitor your blood pressure, pulse, and oxygen levels to make sure that the anesthetics do not cause any problems.  The procedure may vary among health care providers and hospitals.  What happens after the procedure?  · Your blood pressure, temperature, heart rate, breathing rate, and blood oxygen level will be monitored until the medicines you were given have worn off.  · You  will wake up in a recovery area. You may wake up slowly.  · If you feel anxious or agitated, you may be given medicine to help you calm down.  · If you will be going home the same day, your health care provider may check to make sure you can walk, drink, and urinate.  · Your health care provider will treat any pain or side effects you have before you go home.  · Do not drive for 24 hours if you were given a sedative.  Summary  · General anesthesia is used to keep you still and prevent pain during a procedure.  · It is important to tell your healthcare provider about your medical history and any surgeries you have had, and previous experience with anesthesia.  · Follow your healthcare provider’s instructions about when to stop eating, drinking, or taking certain medicines before your procedure.  · Plan to have someone take you home from the hospital or clinic.  This information is not intended to replace advice given to you by your health care provider. Make sure you discuss any questions you have with your health care provider.  Document Released: 03/26/2009 Document Revised: 08/03/2018 Document Reviewed: 08/03/2018  [employer] Interactive Patient Education © 2019 [employer]       Fall Prevention in Hospitals, Adult  As a hospital patient, your condition and the treatments you receive can increase your risk for falls. Some additional risk factors for falls in a hospital include:  · Being in an unfamiliar environment.  · Being on bed rest.  · Your surgery.  · Taking certain medicines.  · Your tubing requirements, such as intravenous (IV) therapy or catheters.  It is important that you learn how to decrease fall risks while at the hospital. Below are important tips that can help prevent falls.  SAFETY TIPS FOR PREVENTING FALLS  Talk about your risk of falling.  · Ask your health care provider why you are at risk for falling. Is it your medicine, illness, tubing placement, or something else?  · Make a plan with your  health care provider to keep you safe from falls.  · Ask your health care provider or pharmacist about side effects of your medicines. Some medicines can make you dizzy or affect your coordination.  Ask for help.  · Ask for help before getting out of bed. You may need to press your call button.  · Ask for assistance in getting safely to the toilet.  · Ask for a walker or cane to be put at your bedside. Ask that most of the side rails on your bed be placed up before your health care provider leaves the room.  · Ask family or friends to sit with you.  · Ask for things that are out of your reach, such as your glasses, hearing aids, telephone, bedside table, or call button.  Follow these tips to avoid falling:  · Stay lying or seated, rather than standing, while waiting for help.  · Wear rubber-soled slippers or shoes whenever you walk in the hospital.  · Avoid quick, sudden movements.  ¨ Change positions slowly.  ¨ Sit on the side of your bed before standing.  ¨ Stand up slowly and wait before you start to walk.  · Let your health care provider know if there is a spill on the floor.  · Pay careful attention to the medical equipment, electrical cords, and tubes around you.  · When you need help, use your call button by your bed or in the bathroom. Wait for one of your health care providers to help you.  · If you feel dizzy or unsure of your footing, return to bed and wait for assistance.  · Avoid being distracted by the TV, telephone, or another person in your room.  · Do not lean or support yourself on rolling objects, such as IV poles or bedside tables.     This information is not intended to replace advice given to you by your health care provider. Make sure you discuss any questions you have with your health care provider.     Document Released: 12/15/2001 Document Revised: 01/08/2016 Document Reviewed: 08/25/2013  [employer] Interactive Patient Education ©2016 [employer]       [facility] 4%  Patient Instruction Sheet    Chlorhexidine Before Surgery  Chlorhexidine gluconate (CHG) is a germ-killing (antiseptic) solution that is used to clean the skin. It gets rid of the bacteria that normally live on the skin. Cleaning your skin with CHG before surgery helps lower the risk for infection after surgery.    How to use CHG solution  · You will take 2 showers, one shower the night before surgery, the second shower the morning of surgery before coming to the hospital.  · Use CHG only as told by your health care provider, and follow the instructions on the label.  · Use CHG solution while taking a shower. Follow these steps when using CHG solution (unless your health care provider gives you different instructions):  1. Start the shower.  2. Use your normal soap and shampoo to wash your face and hair.  3. Turn off the shower or move out of the shower stream.  4. Pour the CHG onto a clean washcloth. Do not use any type of brush or rough-edged sponge.  5. Starting at your neck, lather your body down to your toes. Make sure you:  6. Pay special attention to the part of your body where you will be having surgery. Scrub this area for at least 1 minute.  7. Use the full amount of CHG as directed. Usually, this is one half bottle for each shower.  8. Do not use CHG on your head or face. If the solution gets into your ears or eyes, rinse them well with water.  9. Avoid your genital area.  10. Avoid any areas of skin that have broken skin, cuts, or scrapes.  11. Scrub your back and under your arms. Make sure to wash skin folds.  12. Let the lather sit on your skin for 1-2 minutes or as long as told by your health care  provider.  13. Thoroughly rinse your entire body in the shower. Make sure that all body creases and crevices are rinsed well.  14. Dry off with a clean towel. Do not put any substances on your body afterward, such as powder, lotion, or perfume.  15. Put on clean clothes or pajamas.  16. If it is the night  before your surgery, sleep in clean sheets.    What are the risks?  Risks of using CHG include:  · A skin reaction.  · Hearing loss, if CHG gets in your ears.  · Eye injury, if CHG gets in your eyes and is not rinsed out.  · The CHG product catching fire.  Make sure that you avoid smoking and flames after applying CHG to your skin.  Do not use CHG:  · If you have a chlorhexidine allergy or have previously reacted to chlorhexidine.  · On babies younger than 2 months of age.      On the day of surgery, when you are taken to your room in Outpatient Surgery you will be given a CHG prepackaged cloth to wipe the site for your surgery.  How to use CHG prepackaged cloths  · Follow the instructions on the label.  · Use the CHG cloth on clean, dry skin. Follow these steps when using a CHG cloth (unless your health care provider gives you different instructions):  1. Using the CHG cloth, vigorously scrub the part of your body where you will be having surgery. Scrub using a back-and-forth motion for 3 minutes. The area on your body should be completely wet with CHG when you are finished scrubbing.  2. Do not rinse. Discard the cloth and let the area air-dry for 1 minute. Do not put any substances on your body afterward, such as powder, lotion, or perfume.  Contact a health care provider if:  · Your skin gets irritated after scrubbing.  · You have questions about using your solution or cloth.  Get help right away if:  · Your eyes become very red or swollen.  · Your eyes itch badly.  · Your skin itches badly and is red or swollen.  · Your hearing changes.  · You have trouble seeing.  · You have swelling or tingling in your mouth or throat.  · You have trouble breathing.  · You swallow any chlorhexidine.  Summary  · Chlorhexidine gluconate (CHG) is a germ-killing (antiseptic) solution that is used to clean the skin. Cleaning your skin with CHG before surgery helps lower the risk for infection after surgery.  · You may be given CHG  to use at home. It may be in a bottle or in a prepackaged cloth to use on your skin. Carefully follow your health care provider's instructions and the instructions on the product label.  · Do not use CHG if you have a chlorhexidine allergy.  · Contact your health care provider if your skin gets irritated after scrubbing.  This information is not intended to replace advice given to you by your health care provider. Make sure you discuss any questions you have with your health care provider.  Document Released: 09/11/2013 Document Revised: 11/15/2018 Document Reviewed: 11/15/2018  [employer] Interactive Patient Education © 2019 [employer]          PATIENT/FAMILY/RESPONSIBLE PARTY VERBALIZES UNDERSTANDING OF ABOVE EDUCATION.  COPY OF PAIN SCALE GIVEN AND REVIEWED WITH VERBALIZED UNDERSTANDING.

## 2022-01-17 ENCOUNTER — TRANSCRIBE ORDERS (OUTPATIENT)
Dept: LAB | Facility: HOSPITAL | Age: 47
End: 2022-01-17

## 2022-01-17 DIAGNOSIS — Z11.59 SCREENING FOR VIRAL DISEASE: Primary | ICD-10-CM

## 2022-01-19 ENCOUNTER — LAB (OUTPATIENT)
Dept: LAB | Facility: HOSPITAL | Age: 47
End: 2022-01-19

## 2022-01-19 LAB — SARS-COV-2 ORF1AB RESP QL NAA+PROBE: NOT DETECTED

## 2022-01-19 PROCEDURE — U0004 COV-19 TEST NON-CDC HGH THRU: HCPCS | Performed by: SURGERY

## 2022-01-19 PROCEDURE — C9803 HOPD COVID-19 SPEC COLLECT: HCPCS | Performed by: SURGERY

## 2022-01-20 ENCOUNTER — ANESTHESIA EVENT (OUTPATIENT)
Dept: PERIOP | Facility: HOSPITAL | Age: 47
End: 2022-01-20

## 2022-01-21 ENCOUNTER — ANESTHESIA (OUTPATIENT)
Dept: PERIOP | Facility: HOSPITAL | Age: 47
End: 2022-01-21

## 2022-01-21 ENCOUNTER — HOSPITAL ENCOUNTER (OUTPATIENT)
Facility: HOSPITAL | Age: 47
Setting detail: HOSPITAL OUTPATIENT SURGERY
Discharge: HOME | End: 2022-01-21
Attending: SURGERY | Admitting: SURGERY

## 2022-01-21 VITALS
RESPIRATION RATE: 16 BRPM | SYSTOLIC BLOOD PRESSURE: 119 MMHG | DIASTOLIC BLOOD PRESSURE: 59 MMHG | TEMPERATURE: 97.9 F | OXYGEN SATURATION: 95 % | HEART RATE: 62 BPM

## 2022-01-21 DIAGNOSIS — D48.9 NEOPLASM OF UNCERTAIN BEHAVIOR: ICD-10-CM

## 2022-01-21 PROCEDURE — 25010000002 FENTANYL CITRATE (PF) 100 MCG/2ML SOLUTION

## 2022-01-21 PROCEDURE — 0 CEFAZOLIN PER 500 MG

## 2022-01-21 PROCEDURE — 88305 TISSUE EXAM BY PATHOLOGIST: CPT | Performed by: SURGERY

## 2022-01-21 PROCEDURE — 25010000002 MIDAZOLAM PER 1 MG: Performed by: ANESTHESIOLOGY

## 2022-01-21 PROCEDURE — 25010000002 DEXAMETHASONE PER 1 MG: Performed by: ANESTHESIOLOGY

## 2022-01-21 PROCEDURE — 25010000002 ONDANSETRON PER 1 MG

## 2022-01-21 PROCEDURE — 25010000002 PROPOFOL 1000 MG/100ML EMULSION

## 2022-01-21 PROCEDURE — 88331 PATH CONSLTJ SURG 1 BLK 1SPC: CPT | Performed by: PATHOLOGY

## 2022-01-21 RX ORDER — MIDAZOLAM HYDROCHLORIDE 1 MG/ML
2 INJECTION INTRAMUSCULAR; INTRAVENOUS
Status: DISCONTINUED | OUTPATIENT
Start: 2022-01-21 | End: 2022-01-21 | Stop reason: HOSPADM

## 2022-01-21 RX ORDER — LIDOCAINE HYDROCHLORIDE 20 MG/ML
INJECTION, SOLUTION EPIDURAL; INFILTRATION; INTRACAUDAL; PERINEURAL AS NEEDED
Status: DISCONTINUED | OUTPATIENT
Start: 2022-01-21 | End: 2022-01-21 | Stop reason: SURG

## 2022-01-21 RX ORDER — SODIUM CHLORIDE 0.9 % (FLUSH) 0.9 %
3-10 SYRINGE (ML) INJECTION AS NEEDED
Status: DISCONTINUED | OUTPATIENT
Start: 2022-01-21 | End: 2022-01-21 | Stop reason: HOSPADM

## 2022-01-21 RX ORDER — DEXAMETHASONE SODIUM PHOSPHATE 4 MG/ML
4 INJECTION, SOLUTION INTRA-ARTICULAR; INTRALESIONAL; INTRAMUSCULAR; INTRAVENOUS; SOFT TISSUE ONCE AS NEEDED
Status: COMPLETED | OUTPATIENT
Start: 2022-01-21 | End: 2022-01-21

## 2022-01-21 RX ORDER — NALOXONE HCL 0.4 MG/ML
0.4 VIAL (ML) INJECTION AS NEEDED
Status: DISCONTINUED | OUTPATIENT
Start: 2022-01-21 | End: 2022-01-21 | Stop reason: HOSPADM

## 2022-01-21 RX ORDER — MAGNESIUM HYDROXIDE 1200 MG/15ML
LIQUID ORAL AS NEEDED
Status: DISCONTINUED | OUTPATIENT
Start: 2022-01-21 | End: 2022-01-21 | Stop reason: HOSPADM

## 2022-01-21 RX ORDER — ONDANSETRON 2 MG/ML
INJECTION INTRAMUSCULAR; INTRAVENOUS AS NEEDED
Status: DISCONTINUED | OUTPATIENT
Start: 2022-01-21 | End: 2022-01-21 | Stop reason: SURG

## 2022-01-21 RX ORDER — OXYCODONE AND ACETAMINOPHEN 7.5; 325 MG/1; MG/1
2 TABLET ORAL EVERY 4 HOURS PRN
Status: DISCONTINUED | OUTPATIENT
Start: 2022-01-21 | End: 2022-01-21 | Stop reason: HOSPADM

## 2022-01-21 RX ORDER — IBUPROFEN 600 MG/1
600 TABLET ORAL ONCE AS NEEDED
Status: DISCONTINUED | OUTPATIENT
Start: 2022-01-21 | End: 2022-01-21 | Stop reason: HOSPADM

## 2022-01-21 RX ORDER — SODIUM CHLORIDE 0.9 % (FLUSH) 0.9 %
3 SYRINGE (ML) INJECTION EVERY 12 HOURS SCHEDULED
Status: DISCONTINUED | OUTPATIENT
Start: 2022-01-21 | End: 2022-01-21 | Stop reason: HOSPADM

## 2022-01-21 RX ORDER — LIDOCAINE HYDROCHLORIDE 10 MG/ML
0.5 INJECTION, SOLUTION EPIDURAL; INFILTRATION; INTRACAUDAL; PERINEURAL ONCE AS NEEDED
Status: DISCONTINUED | OUTPATIENT
Start: 2022-01-21 | End: 2022-01-21 | Stop reason: HOSPADM

## 2022-01-21 RX ORDER — FENTANYL CITRATE 50 UG/ML
25 INJECTION, SOLUTION INTRAMUSCULAR; INTRAVENOUS
Status: DISCONTINUED | OUTPATIENT
Start: 2022-01-21 | End: 2022-01-21 | Stop reason: HOSPADM

## 2022-01-21 RX ORDER — LIDOCAINE HYDROCHLORIDE AND EPINEPHRINE BITARTRATE 20; .01 MG/ML; MG/ML
INJECTION, SOLUTION SUBCUTANEOUS AS NEEDED
Status: DISCONTINUED | OUTPATIENT
Start: 2022-01-21 | End: 2022-01-21 | Stop reason: HOSPADM

## 2022-01-21 RX ORDER — PROPOFOL 10 MG/ML
INJECTION, EMULSION INTRAVENOUS CONTINUOUS PRN
Status: DISCONTINUED | OUTPATIENT
Start: 2022-01-21 | End: 2022-01-21 | Stop reason: SURG

## 2022-01-21 RX ORDER — OXYCODONE AND ACETAMINOPHEN 10; 325 MG/1; MG/1
1 TABLET ORAL ONCE AS NEEDED
Status: DISCONTINUED | OUTPATIENT
Start: 2022-01-21 | End: 2022-01-21 | Stop reason: HOSPADM

## 2022-01-21 RX ORDER — ONDANSETRON 2 MG/ML
4 INJECTION INTRAMUSCULAR; INTRAVENOUS ONCE AS NEEDED
Status: DISCONTINUED | OUTPATIENT
Start: 2022-01-21 | End: 2022-01-21 | Stop reason: HOSPADM

## 2022-01-21 RX ORDER — FENTANYL CITRATE 50 UG/ML
INJECTION, SOLUTION INTRAMUSCULAR; INTRAVENOUS AS NEEDED
Status: DISCONTINUED | OUTPATIENT
Start: 2022-01-21 | End: 2022-01-21 | Stop reason: SURG

## 2022-01-21 RX ORDER — LABETALOL HYDROCHLORIDE 5 MG/ML
5 INJECTION, SOLUTION INTRAVENOUS
Status: DISCONTINUED | OUTPATIENT
Start: 2022-01-21 | End: 2022-01-21 | Stop reason: HOSPADM

## 2022-01-21 RX ORDER — SODIUM CHLORIDE, SODIUM LACTATE, POTASSIUM CHLORIDE, CALCIUM CHLORIDE 600; 310; 30; 20 MG/100ML; MG/100ML; MG/100ML; MG/100ML
100 INJECTION, SOLUTION INTRAVENOUS CONTINUOUS
Status: DISCONTINUED | OUTPATIENT
Start: 2022-01-21 | End: 2022-01-21 | Stop reason: HOSPADM

## 2022-01-21 RX ORDER — FLUMAZENIL 0.1 MG/ML
0.2 INJECTION INTRAVENOUS AS NEEDED
Status: DISCONTINUED | OUTPATIENT
Start: 2022-01-21 | End: 2022-01-21 | Stop reason: HOSPADM

## 2022-01-21 RX ORDER — CEFAZOLIN SODIUM 1 G/3ML
INJECTION, POWDER, FOR SOLUTION INTRAMUSCULAR; INTRAVENOUS AS NEEDED
Status: DISCONTINUED | OUTPATIENT
Start: 2022-01-21 | End: 2022-01-21 | Stop reason: SURG

## 2022-01-21 RX ADMIN — FENTANYL CITRATE 100 MCG: 50 INJECTION, SOLUTION INTRAMUSCULAR; INTRAVENOUS at 07:04

## 2022-01-21 RX ADMIN — SODIUM CHLORIDE, POTASSIUM CHLORIDE, SODIUM LACTATE AND CALCIUM CHLORIDE 100 ML/HR: 600; 310; 30; 20 INJECTION, SOLUTION INTRAVENOUS at 06:41

## 2022-01-21 RX ADMIN — CEFAZOLIN 1 G: 330 INJECTION, POWDER, FOR SOLUTION INTRAMUSCULAR; INTRAVENOUS at 07:13

## 2022-01-21 RX ADMIN — MIDAZOLAM 2 MG: 1 INJECTION INTRAMUSCULAR; INTRAVENOUS at 06:40

## 2022-01-21 RX ADMIN — DEXAMETHASONE SODIUM PHOSPHATE 4 MG: 4 INJECTION, SOLUTION INTRA-ARTICULAR; INTRALESIONAL; INTRAMUSCULAR; INTRAVENOUS; SOFT TISSUE at 06:40

## 2022-01-21 RX ADMIN — PROPOFOL 50 MCG/KG/MIN: 10 INJECTION, EMULSION INTRAVENOUS at 07:06

## 2022-01-21 RX ADMIN — PROPOFOL 50 MG: 10 INJECTION, EMULSION INTRAVENOUS at 07:05

## 2022-01-21 RX ADMIN — ONDANSETRON 4 MG: 2 INJECTION INTRAMUSCULAR; INTRAVENOUS at 07:15

## 2022-01-21 RX ADMIN — LIDOCAINE HYDROCHLORIDE 60 MG: 20 INJECTION, SOLUTION EPIDURAL; INFILTRATION; INTRACAUDAL; PERINEURAL at 07:04

## 2022-01-21 NOTE — OP NOTE
UPPER EXTREMITY LESION/CYST EXCISION  Procedure Report    Patient Name:  Екатерина Stovall  YOB: 1975    Date of Surgery:  1/21/2022    Attending Surgeon: Harry Holt MD     PROCEDURE:  1. Excisional biopsy, left ring finger nailbed lesion  2. Simple repair, left ring finger, 2 cm    PREOPERATIVE DIAGNOSIS:  Neoplasm of uncertain behavior, left ring finger    POSTOPERATIVE DIAGNOSIS:  Neoplasm of uncertain behavior, left ring finger, benign    ANESTHESIA:  MAC with local     INDICATION FOR PROCEDURE:  This is a 46-year-old female with a lesion arising from her left ring finger eponychial fold. It is locally destructive and is causing deformity of the nail plate. We will proceed with excisional biopsy and reconstruction as planned.    OPERATIVE FINDINGS:  The left ring finger lesion was well demarcated from the underside of the eponychial him and the sterile matrix.  It arose from the apex of the eponychial fold.  This was removed en bloc and sent to pathology.  The final diagnosis was not determined based on frozen section; however, it was determined to be benign.    DESCRIPTION OF PROCEDURE:  The patient was transferred to the operating room and placed supine on the OR table.  Her left upper extremity was placed on an extremity board.  Neck anesthesia was induced and a tourniquet was placed on the arm.  The bed was turned 90 degrees counterclockwise.  The extremity was prepped and draped in sterile fashion.  A timeout was performed with all team members.    I began by making 2 incisions from the leading edge of the eponychium along the full length of the eponychial fold proximally.  One on each margin of the nail plate.  I then used a caudal elevator to reflect the eponychial fold.  The lesion continued proximally between the eponychial fold and the sterile matrix with no evidence of invasion into either structure.  It was carefully dissected proximally and appeared to be arising from the lining  of the eponychial him at the apex of the fold.  The lesion was dissected free of all attachments and the base was carefully excised to ensure that no lesion was remaining.  The specimen was sent to pathology.  The eponychial incisions were repaired with interrupted 4-0 nylon.  After receiving word of the benign findings, the site was copiously irrigated, and the finger was dressed with Xeroform and sterile gauze wrap.    BLOOD LOSS:   5 mL    COMPLICATIONS:   None    SPECIMENS:          Left ring finger lesion    IMPLANTS:    Nothing was implanted during the procedure    DISPOSITION:   Home and self-care.  Return to clinic in 13 days.    STAFF:  Surgeon(s):  Harry Holt MD           Electronically signed by Harry Holt MD, 01/21/22, 7:04 AM CST.

## 2022-01-21 NOTE — ANESTHESIA PREPROCEDURE EVALUATION
Anesthesia Evaluation     Patient summary reviewed   no history of anesthetic complications:  NPO Solid Status: > 8 hours  NPO Liquid Status: > 8 hours           Airway   Mallampati: I  TM distance: >3 FB  Neck ROM: full  No difficulty expected  Dental - normal exam     Pulmonary - negative pulmonary ROS   (-) asthma, sleep apnea, not a smoker  Cardiovascular   Exercise tolerance: good (4-7 METS)    ECG reviewed    (+) hyperlipidemia,       Neuro/Psych  (-) seizures, TIA, CVA  GI/Hepatic/Renal/Endo    (+) obesity,     (-) liver disease, no renal disease, diabetes    Musculoskeletal     Abdominal    Substance History      OB/GYN          Other                        Anesthesia Plan    ASA 2     MAC     intravenous induction     Anesthetic plan, all risks, benefits, and alternatives have been provided, discussed and informed consent has been obtained with: patient.        CODE STATUS:

## 2022-01-21 NOTE — ANESTHESIA POSTPROCEDURE EVALUATION
Patient: Екатерина Stovall    Procedure Summary     Date: 01/21/22 Room / Location: [facility] OR 09 /  [facility] OR    Anesthesia Start: 0703 Anesthesia Stop: 0800    Procedure: LEFT RING FINGER NAILBED LESION EXCISIONAL BIOPSY (Left Finger Ring) Diagnosis: (SKIN NEOPLASM OF UNCERTAIN BEHAVIOR)    Surgeons: Harry Holt MD Provider: Yamilet Stuart CRNA    Anesthesia Type: MAC ASA Status: 2          Anesthesia Type: MAC    Vitals  Vitals Value Taken Time   /73 01/21/22 0811   Temp     Pulse 66 01/21/22 0812   Resp     SpO2 95 % 01/21/22 0812   Vitals shown include unvalidated device data.        Post Anesthesia Care and Evaluation    Patient location during evaluation: PACU  Patient participation: complete - patient participated  Level of consciousness: awake and alert  Pain management: adequate  Airway patency: patent  Anesthetic complications: No anesthetic complications    Cardiovascular status: acceptable  Respiratory status: acceptable  Hydration status: acceptable    Comments: Blood pressure 126/60, pulse 63, temperature 97.9 °F (36.6 °C), temperature source Temporal, resp. rate 16, SpO2 94 %    Pt discharged from PACU based on reese score >8

## 2022-01-21 NOTE — DISCHARGE INSTRUCTIONS
Please leave your dressing in place for 72 hours.  Keep your dressing clean and dry at all times.    Upon removing your dressing, your incision can be left open to air.  You may resume normal showering and handwashing.  Please do not submerge your incision below water.    You will return to clinic in 2 weeks for suture removal.    Please call my office with any questions or concerns at [phone].          YOUR NEXT PAIN MEDICATION IS DUE AT______________        Moderate Conscious Sedation, Adult, Care After  Refer to this sheet in the next few weeks. These instructions provide you with information on caring for yourself after your procedure. Your health care provider may also give you more specific instructions. Your treatment has been planned according to current medical practices, but problems sometimes occur. Call your health care provider if you have any problems or questions after your procedure.  WHAT TO EXPECT AFTER THE PROCEDURE    After your procedure:  · You may feel sleepy, clumsy, and have poor balance for several hours.  · Vomiting may occur if you eat too soon after the procedure.  HOME CARE INSTRUCTIONS  · Do not participate in any activities where you could become injured for at least 24 hours. Do not:  ¨ Drive.  ¨ Swim.  ¨ Ride a bicycle.  ¨ Operate heavy machinery.  ¨ Cook.  ¨ Use power tools.  ¨ Climb ladders.  ¨ Work from a high place.  · Do not make important decisions or sign legal documents until you are improved.  · If you vomit, drink water, juice, or soup when you can drink without vomiting. Make sure you have little or no nausea before eating solid foods.  · Only take over-the-counter or prescription medicines for pain, discomfort, or fever as directed by your health care provider.  · Make sure you and your family fully understand everything about the medicines given to you, including what side effects may occur.  · You should not drink alcohol, take sleeping pills, or take medicines  that cause drowsiness for at least 24 hours.  · If you smoke, do not smoke without supervision.  · If you are feeling better, you may resume normal activities 24 hours after you were sedated.  · Keep all appointments with your health care provider.  SEEK MEDICAL CARE IF:  · Your skin is pale or bluish in color.  · You continue to feel nauseous or vomit.  · Your pain is getting worse and is not helped by medicine.  · You have bleeding or swelling.  · You are still sleepy or feeling clumsy after 24 hours.  SEEK IMMEDIATE MEDICAL CARE IF:  · You develop a rash.  · You have difficulty breathing.  · You develop any type of allergic problem.  · You have a fever.  MAKE SURE YOU:  · Understand these instructions.  · Will watch your condition.  · Will get help right away if you are not doing well or get worse.     This information is not intended to replace advice given to you by your health care provider. Make sure you discuss any questions you have with your health care provider.     Document Released: 10/08/2014 Document Revised: 01/08/2016 Document Reviewed: 10/08/2014  [employer] Interactive Patient Education ©2016 [employer]         CALL YOUR PHYSICIAN IF YOU EXPERIENCE  INCREASED PAIN NOT HELPED BY YOUR PAIN MEDICATION.        Fall Prevention in the Home      Falls can cause injuries. They can happen to people of all ages. There are many things you can do to make your home safe and to help prevent falls.    WHAT CAN I DO ON THE OUTSIDE OF MY HOME?  · Regularly fix the edges of walkways and driveways and fix any cracks.  · Remove anything that might make you trip as you walk through a door, such as a raised step or threshold.  · Trim any bushes or trees on the path to your home.  · Use bright outdoor lighting.  · Clear any walking paths of anything that might make someone trip, such as rocks or tools.  · Regularly check to see if handrails are loose or broken. Make sure that both sides of any steps have  handrails.  · Any raised decks and porches should have guardrails on the edges.  · Have any leaves, snow, or ice cleared regularly.  · Use sand or salt on walking paths during winter.  · Clean up any spills in your garage right away. This includes oil or grease spills.  WHAT CAN I DO IN THE BATHROOM?    · Use night lights.  · Install grab bars by the toilet and in the tub and shower. Do not use towel bars as grab bars.  · Use non-skid mats or decals in the tub or shower.  · If you need to sit down in the shower, use a plastic, non-slip stool.  · Keep the floor dry. Clean up any water that spills on the floor as soon as it happens.  · Remove soap buildup in the tub or shower regularly.  · Attach bath mats securely with double-sided non-slip rug tape.  · Do not have throw rugs and other things on the floor that can make you trip.  WHAT CAN I DO IN THE BEDROOM?  · Use night lights.  · Make sure that you have a light by your bed that is easy to reach.  · Do not use any sheets or blankets that are too big for your bed. They should not hang down onto the floor.  · Have a firm chair that has side arms. You can use this for support while you get dressed.  · Do not have throw rugs and other things on the floor that can make you trip.  WHAT CAN I DO IN THE KITCHEN?  · Clean up any spills right away.  · Avoid walking on wet floors.  · Keep items that you use a lot in easy-to-reach places.  · If you need to reach something above you, use a strong step stool that has a grab bar.  · Keep electrical cords out of the way.  · Do not use floor polish or wax that makes floors slippery. If you must use wax, use non-skid floor wax.  · Do not have throw rugs and other things on the floor that can make you trip.  WHAT CAN I DO WITH MY STAIRS?  · Do not leave any items on the stairs.  · Make sure that there are handrails on both sides of the stairs and use them. Fix handrails that are broken or loose. Make sure that handrails are as long  as the stairways.  · Check any carpeting to make sure that it is firmly attached to the stairs. Fix any carpet that is loose or worn.  · Avoid having throw rugs at the top or bottom of the stairs. If you do have throw rugs, attach them to the floor with carpet tape.  · Make sure that you have a light switch at the top of the stairs and the bottom of the stairs. If you do not have them, ask someone to add them for you.  WHAT ELSE CAN I DO TO HELP PREVENT FALLS?  · Wear shoes that:  ¨ Do not have high heels.  ¨ Have rubber bottoms.  ¨ Are comfortable and fit you well.  ¨ Are closed at the toe. Do not wear sandals.  · If you use a stepladder:  ¨ Make sure that it is fully opened. Do not climb a closed stepladder.  ¨ Make sure that both sides of the stepladder are locked into place.  ¨ Ask someone to hold it for you, if possible.  · Clearly navjot and make sure that you can see:  ¨ Any grab bars or handrails.  ¨ First and last steps.  ¨ Where the edge of each step is.  · Use tools that help you move around (mobility aids) if they are needed. These include:  ¨ Canes.  ¨ Walkers.  ¨ Scooters.  ¨ Crutches.  · Turn on the lights when you go into a dark area. Replace any light bulbs as soon as they burn out.  · Set up your furniture so you have a clear path. Avoid moving your furniture around.  · If any of your floors are uneven, fix them.  · If there are any pets around you, be aware of where they are.  · Review your medicines with your doctor. Some medicines can make you feel dizzy. This can increase your chance of falling.  Ask your doctor what other things that you can do to help prevent falls.     This information is not intended to replace advice given to you by your health care provider. Make sure you discuss any questions you have with your health care provider.     Document Released: 10/14/2010 Document Revised: 05/03/2016 Document Reviewed: 01/22/2016  [employer] Interactive Patient Education ©2016 [employer]     PATIENT/FAMILY/RESPONSIBLE PARTY VERBALIZES UNDERSTANDING OF ABOVE EDUCATION.  COPY OF PAIN SCALE GIVEN AND REVIEWED WITH VERBALIZED UNDERSTANDING.

## 2022-01-28 LAB
CYTO UR: NORMAL
LAB AP CASE REPORT: NORMAL
Lab: NORMAL
PATH REPORT.FINAL DX SPEC: NORMAL
PATH REPORT.GROSS SPEC: NORMAL

## 2022-02-10 ENCOUNTER — HOSPITAL ENCOUNTER (OUTPATIENT)
Dept: CARDIOLOGY | Facility: HOSPITAL | Age: 47
Discharge: HOME | End: 2022-02-10
Admitting: INTERNAL MEDICINE

## 2022-02-10 VITALS
BODY MASS INDEX: 30.37 KG/M2 | HEIGHT: 66 IN | WEIGHT: 189 LBS | SYSTOLIC BLOOD PRESSURE: 111 MMHG | DIASTOLIC BLOOD PRESSURE: 63 MMHG

## 2022-02-10 DIAGNOSIS — R00.2 PALPITATIONS: ICD-10-CM

## 2022-02-10 PROCEDURE — 93306 TTE W/DOPPLER COMPLETE: CPT

## 2022-02-10 PROCEDURE — 93306 TTE W/DOPPLER COMPLETE: CPT | Performed by: INTERNAL MEDICINE

## 2022-02-13 LAB
BH CV ECHO MEAS - AO MAX PG (FULL): 2.9 MMHG
BH CV ECHO MEAS - AO MAX PG: 8.1 MMHG
BH CV ECHO MEAS - AO MEAN PG (FULL): 1 MMHG
BH CV ECHO MEAS - AO MEAN PG: 4 MMHG
BH CV ECHO MEAS - AO ROOT AREA (BSA CORRECTED): 1.4
BH CV ECHO MEAS - AO ROOT AREA: 5.7 CM^2
BH CV ECHO MEAS - AO ROOT DIAM: 2.7 CM
BH CV ECHO MEAS - AO V2 MAX: 142 CM/SEC
BH CV ECHO MEAS - AO V2 MEAN: 97 CM/SEC
BH CV ECHO MEAS - AO V2 VTI: 32 CM
BH CV ECHO MEAS - AVA(I,A): 2.5 CM^2
BH CV ECHO MEAS - AVA(I,D): 2.5 CM^2
BH CV ECHO MEAS - AVA(V,A): 2.5 CM^2
BH CV ECHO MEAS - AVA(V,D): 2.5 CM^2
BH CV ECHO MEAS - BSA(HAYCOCK): 2 M^2
BH CV ECHO MEAS - BSA: 2 M^2
BH CV ECHO MEAS - BZI_BMI: 30.5 KILOGRAMS/M^2
BH CV ECHO MEAS - BZI_METRIC_HEIGHT: 167.6 CM
BH CV ECHO MEAS - BZI_METRIC_WEIGHT: 85.7 KG
BH CV ECHO MEAS - EDV(CUBED): 75.7 ML
BH CV ECHO MEAS - EDV(MOD-SP4): 79 ML
BH CV ECHO MEAS - EDV(TEICH): 79.9 ML
BH CV ECHO MEAS - EF(CUBED): 86.1 %
BH CV ECHO MEAS - EF(MOD-SP4): 71.1 %
BH CV ECHO MEAS - EF(TEICH): 80 %
BH CV ECHO MEAS - ESV(CUBED): 10.5 ML
BH CV ECHO MEAS - ESV(MOD-SP4): 22.8 ML
BH CV ECHO MEAS - ESV(TEICH): 16 ML
BH CV ECHO MEAS - FS: 48.2 %
BH CV ECHO MEAS - IVS/LVPW: 0.99
BH CV ECHO MEAS - IVSD: 0.79 CM
BH CV ECHO MEAS - LA DIMENSION: 2.7 CM
BH CV ECHO MEAS - LA/AO: 1
BH CV ECHO MEAS - LAT PEAK E' VEL: 6.3 CM/SEC
BH CV ECHO MEAS - LV DIASTOLIC VOL/BSA (35-75): 40.5 ML/M^2
BH CV ECHO MEAS - LV MASS(C)D: 100.4 GRAMS
BH CV ECHO MEAS - LV MASS(C)DI: 51.4 GRAMS/M^2
BH CV ECHO MEAS - LV MAX PG: 5.2 MMHG
BH CV ECHO MEAS - LV MEAN PG: 3 MMHG
BH CV ECHO MEAS - LV SYSTOLIC VOL/BSA (12-30): 11.7 ML/M^2
BH CV ECHO MEAS - LV V1 MAX: 114 CM/SEC
BH CV ECHO MEAS - LV V1 MEAN: 77.9 CM/SEC
BH CV ECHO MEAS - LV V1 VTI: 25.5 CM
BH CV ECHO MEAS - LVIDD: 4.2 CM
BH CV ECHO MEAS - LVIDS: 2.2 CM
BH CV ECHO MEAS - LVLD AP4: 8.4 CM
BH CV ECHO MEAS - LVLS AP4: 6.7 CM
BH CV ECHO MEAS - LVOT AREA (M): 3.1 CM^2
BH CV ECHO MEAS - LVOT AREA: 3.1 CM^2
BH CV ECHO MEAS - LVOT DIAM: 2 CM
BH CV ECHO MEAS - LVPWD: 0.79 CM
BH CV ECHO MEAS - MED PEAK E' VEL: 6.85 CM/SEC
BH CV ECHO MEAS - MV A MAX VEL: 67.4 CM/SEC
BH CV ECHO MEAS - MV DEC TIME: 0.25 SEC
BH CV ECHO MEAS - MV E MAX VEL: 106 CM/SEC
BH CV ECHO MEAS - MV E/A: 1.6
BH CV ECHO MEAS - PA MAX PG: 2.3 MMHG
BH CV ECHO MEAS - PA V2 MAX: 75.7 CM/SEC
BH CV ECHO MEAS - SI(AO): 93.8 ML/M^2
BH CV ECHO MEAS - SI(CUBED): 33.4 ML/M^2
BH CV ECHO MEAS - SI(LVOT): 41 ML/M^2
BH CV ECHO MEAS - SI(MOD-SP4): 28.8 ML/M^2
BH CV ECHO MEAS - SI(TEICH): 32.7 ML/M^2
BH CV ECHO MEAS - SV(AO): 183.2 ML
BH CV ECHO MEAS - SV(CUBED): 65.2 ML
BH CV ECHO MEAS - SV(LVOT): 80.1 ML
BH CV ECHO MEAS - SV(MOD-SP4): 56.2 ML
BH CV ECHO MEAS - SV(TEICH): 63.9 ML
BH CV ECHO MEASUREMENTS AVERAGE E/E' RATIO: 16.12
LEFT ATRIUM VOLUME INDEX: 21.2 ML/M2
LEFT ATRIUM VOLUME: 41.4 CM3
MAXIMAL PREDICTED HEART RATE: 173 BPM
STRESS TARGET HR: 147 BPM

## 2022-02-23 RX ORDER — ESCITALOPRAM OXALATE 10 MG/1
10 TABLET ORAL DAILY
Qty: 90 TABLET | Refills: 3 | Status: SHIPPED | OUTPATIENT
Start: 2022-02-23

## 2022-02-23 NOTE — TELEPHONE ENCOUNTER
Rx Refill Note  Requested Prescriptions     Pending Prescriptions Disp Refills   • escitalopram (LEXAPRO) 10 MG tablet 90 tablet 3     Sig: Take 1 tablet by mouth Daily.      Last office visit with prescribing clinician: 11/17/2021      Next office visit with prescribing clinician: 6/27/2022            Deisi Hilton MA  02/23/22, 08:24 CST

## 2022-03-03 ENCOUNTER — OFFICE VISIT (OUTPATIENT)
Dept: FAMILY MEDICINE CLINIC | Facility: CLINIC | Age: 47
End: 2022-03-03

## 2022-03-03 VITALS
TEMPERATURE: 99.4 F | HEART RATE: 85 BPM | HEIGHT: 65 IN | SYSTOLIC BLOOD PRESSURE: 126 MMHG | RESPIRATION RATE: 18 BRPM | DIASTOLIC BLOOD PRESSURE: 76 MMHG | WEIGHT: 194 LBS | OXYGEN SATURATION: 95 % | BODY MASS INDEX: 32.32 KG/M2

## 2022-03-03 DIAGNOSIS — J01.00 SUBACUTE MAXILLARY SINUSITIS: ICD-10-CM

## 2022-03-03 DIAGNOSIS — J02.9 SORE THROAT: Primary | ICD-10-CM

## 2022-03-03 LAB
EXPIRATION DATE: NORMAL
EXPIRATION DATE: NORMAL
FLUAV AG NPH QL: NEGATIVE
FLUBV AG NPH QL: NEGATIVE
INTERNAL CONTROL: NORMAL
INTERNAL CONTROL: NORMAL
Lab: NORMAL
Lab: NORMAL
S PYO AG THROAT QL: NEGATIVE

## 2022-03-03 PROCEDURE — 99213 OFFICE O/P EST LOW 20 MIN: CPT | Performed by: FAMILY MEDICINE

## 2022-03-03 PROCEDURE — 87880 STREP A ASSAY W/OPTIC: CPT | Performed by: FAMILY MEDICINE

## 2022-03-03 PROCEDURE — 96372 THER/PROPH/DIAG INJ SC/IM: CPT | Performed by: FAMILY MEDICINE

## 2022-03-03 PROCEDURE — 87804 INFLUENZA ASSAY W/OPTIC: CPT | Performed by: FAMILY MEDICINE

## 2022-03-03 RX ORDER — TRIAMCINOLONE ACETONIDE 40 MG/ML
40 INJECTION, SUSPENSION INTRA-ARTICULAR; INTRAMUSCULAR ONCE
Status: COMPLETED | OUTPATIENT
Start: 2022-03-03 | End: 2022-03-03

## 2022-03-03 RX ORDER — AZITHROMYCIN 250 MG/1
TABLET, FILM COATED ORAL
Qty: 6 TABLET | Refills: 0 | Status: SHIPPED | OUTPATIENT
Start: 2022-03-03 | End: 2022-06-27

## 2022-03-03 RX ADMIN — TRIAMCINOLONE ACETONIDE 40 MG: 40 INJECTION, SUSPENSION INTRA-ARTICULAR; INTRAMUSCULAR at 10:29

## 2022-03-03 NOTE — PROGRESS NOTES
"Екатерина Stovall    1975    Chief Complaint   Patient presents with   • Sore Throat     x1day   • Generalized Body Aches   • Chills     100.5       Vitals:    03/03/22 0952   BP: 126/76   BP Location: Left arm   Patient Position: Sitting   Cuff Size: Adult   Pulse: 85   Resp: 18   Temp: 99.4 °F (37.4 °C)   TempSrc: Oral   SpO2: 95%   Weight: 88 kg (194 lb)   Height: 165.1 cm (65\")   PainSc:   5       46-year-old female with fever cough sore throat sinus congestion      Review of Systems   Constitutional: Positive for fever.   HENT: Positive for sinus pressure and sore throat.    Respiratory: Positive for cough. Negative for shortness of breath.    Cardiovascular: Negative for chest pain.       Past Medical History:   Diagnosis Date   • Anxiety    • Arrhythmia    • Hyperlipidemia    • Migraine    • Seasonal allergies          Current Outpatient Medications:   •  APPLE CIDER VINEGAR PO, Take 2 tablets by mouth Daily., Disp: , Rfl:   •  Ascorbic Acid (VITAMIN C PO), Take 1 tablet by mouth Daily., Disp: , Rfl:   •  atorvastatin (Lipitor) 40 MG tablet, Take 1 tablet by mouth Every Night., Disp: 90 tablet, Rfl: 3  •  Cholecalciferol (Vitamin D3) 1.25 MG (09221 UT) capsule, 5,000 Units Daily., Disp: , Rfl:   •  escitalopram (LEXAPRO) 10 MG tablet, Take 1 tablet by mouth Daily., Disp: 90 tablet, Rfl: 3  •  estradiol (Estrace) 1 MG tablet, Take 1 tablet by mouth Daily., Disp: 30 tablet, Rfl: 5  •  levocetirizine (XYZAL) 5 MG tablet, Take 5 mg by mouth Every Evening., Disp: , Rfl:   •  montelukast (SINGULAIR) 10 MG tablet, Take 1 tablet by mouth every night at bedtime., Disp: 90 tablet, Rfl: 3  •  Multiple Vitamins-Minerals (ZINC PO), Take 1 tablet by mouth Daily., Disp: , Rfl:   •  Probiotic Product (Align) 4 MG capsule, Take 2 tablets by mouth Daily., Disp: , Rfl:   •  azithromycin (Zithromax Z-Cole) 250 MG tablet, Take 2 tablets the first day, then 1 tablet daily for 4 days., Disp: 6 tablet, Rfl: 0    Current " Facility-Administered Medications:   •  triamcinolone acetonide (KENALOG-40) injection 40 mg, 40 mg, Intramuscular, Once, Fermín Rosas MD    No Known Allergies    There is no problem list on file for this patient.      Social History     Socioeconomic History   • Marital status:    Tobacco Use   • Smoking status: Never Smoker   • Smokeless tobacco: Never Used   Vaping Use   • Vaping Use: Never used   Substance and Sexual Activity   • Alcohol use: Not Currently   • Drug use: Not Currently       Past Surgical History:   Procedure Laterality Date   • APPENDECTOMY     • ARM LESION/CYST EXCISION Left 1/21/2022    Procedure: LEFT RING FINGER NAILBED LESION EXCISIONAL BIOPSY;  Surgeon: Harry Holt MD;  Location: [facility];  Service: Plastics;  Laterality: Left;   • LAPAROSCOPIC SALPINGOOPHERECTOMY Bilateral    • OOPHORECTOMY     • TOTAL ABDOMINAL HYSTERECTOMY      RIVKA w/ BSO due to pain caused by varicose veins and heavy bleeding.   • TUBAL ABDOMINAL LIGATION         Physical Exam  Vitals and nursing note reviewed.   Constitutional:       Appearance: Normal appearance.   HENT:      Right Ear: Tympanic membrane and ear canal normal.      Left Ear: Tympanic membrane and ear canal normal.      Mouth/Throat:      Mouth: Mucous membranes are moist.      Pharynx: Oropharynx is clear.   Eyes:      Pupils: Pupils are equal, round, and reactive to light.   Cardiovascular:      Rate and Rhythm: Normal rate and regular rhythm.   Pulmonary:      Effort: Pulmonary effort is normal.      Breath sounds: Normal breath sounds. No wheezing or rhonchi.   Musculoskeletal:      Right lower leg: No edema.      Left lower leg: No edema.   Skin:     General: Skin is warm.      Findings: No rash.   Neurological:      General: No focal deficit present.      Mental Status: She is alert and oriented to person, place, and time.   Psychiatric:         Mood and Affect: Mood normal.         Behavior: Behavior normal.          "Thought Content: Thought content normal.         Judgment: Judgment normal.         Vitals:    03/03/22 0952   BP: 126/76   BP Location: Left arm   Patient Position: Sitting   Cuff Size: Adult   Pulse: 85   Resp: 18   Temp: 99.4 °F (37.4 °C)   TempSrc: Oral   SpO2: 95%   Weight: 88 kg (194 lb)   Height: 165.1 cm (65\")     Patient's Body mass index is 32.28 kg/m². indicating that she is obese (BMI >30). Obesity-related health conditions include the following: dyslipidemias. Obesity is unchanged. BMI is is above average; BMI management plan is completed. We discussed portion control and increasing exercise..      Diagnoses and all orders for this visit:    1. Sore throat (Primary)  -     COVID-19,[facility] ROUTINE, NP/OP SWAB IN TRANSPORT MEDIA OR ESWAB 72 HR TAT - Swab, Anterior nasal  -     POC Influenza A / B  -     POC Rapid Strep A    2. Subacute maxillary sinusitis  -     triamcinolone acetonide (KENALOG-40) injection 40 mg    Other orders  -     azithromycin (Zithromax Z-Cole) 250 MG tablet; Take 2 tablets the first day, then 1 tablet daily for 4 days.  Dispense: 6 tablet; Refill: 0        Problems Addressed this Visit     None      Visit Diagnoses     Sore throat    -  Primary    Relevant Orders    COVID-19,[facility] ROUTINE, NP/OP SWAB IN TRANSPORT MEDIA OR ESWAB 72 HR TAT - Swab, Anterior nasal    POC Influenza A / B (Completed)    POC Rapid Strep A (Completed)    Subacute maxillary sinusitis        Relevant Medications    triamcinolone acetonide (KENALOG-40) injection 40 mg      Diagnoses       Codes Comments    Sore throat    -  Primary ICD-10-CM: J02.9  ICD-9-CM: 462     Subacute maxillary sinusitis     ICD-10-CM: J01.00  ICD-9-CM: 461.0           Health Maintenance:  Immunization History   Administered Date(s) Administered   • COVID-19 (MODERNA) 1st, 2nd, 3rd Dose Only 08/23/2021, 09/20/2021   • FluLaval/Fluarix/Fluzone >6 10/20/2021                  Plan-above-Covid PCR pending strep and flu " negative              Electronically signed by Fermín Rosas MD 03/03/2022

## 2022-03-04 ENCOUNTER — PATIENT ROUNDING (BHMG ONLY) (OUTPATIENT)
Dept: FAMILY MEDICINE CLINIC | Facility: CLINIC | Age: 47
End: 2022-03-04

## 2022-03-04 LAB
LABCORP SARS-COV-2, NAA 2 DAY TAT: NORMAL
SARS-COV-2 RNA RESP QL NAA+PROBE: NOT DETECTED

## 2022-03-07 RX ORDER — MONTELUKAST SODIUM 10 MG/1
10 TABLET ORAL
Qty: 90 TABLET | Refills: 3 | Status: SHIPPED | OUTPATIENT
Start: 2022-03-07

## 2022-03-07 NOTE — PROGRESS NOTES
"March 7, 2022    Hello, may I speak with Екатерина Stovall?    My name is Desi.     I am  with [facility]  [address], [address]  [place] [address] [phone].    Before we get started may I verify your date of birth? 1975    I am calling to officially welcome you to our practice and ask about your recent visit. Is this a good time to talk? Yes    Tell me about your visit with us. What things went well?  \"So excited to be back as a patient. I love you all and you all have spoiled me. It's like going to see family at this office.Short wait times and local. Don't have to drive out of town for appointment.  I love being able to use 911 View and message the office.        We're always looking for ways to make our patients' experiences even better. Do you have recommendations on ways we may improve?      Overall were you satisfied with your first visit to our practice? no       I appreciate you taking the time to speak with me today. Is there anything else I can do for you? no      Thank you, and have a great day.      "

## 2022-04-19 ENCOUNTER — HOSPITAL ENCOUNTER (EMERGENCY)
Facility: HOSPITAL | Age: 47
Discharge: HOME | End: 2022-04-19
Admitting: EMERGENCY MEDICINE

## 2022-04-19 ENCOUNTER — APPOINTMENT (OUTPATIENT)
Dept: GENERAL RADIOLOGY | Facility: HOSPITAL | Age: 47
End: 2022-04-19

## 2022-04-19 VITALS
WEIGHT: 187 LBS | HEIGHT: 66 IN | HEART RATE: 69 BPM | SYSTOLIC BLOOD PRESSURE: 115 MMHG | RESPIRATION RATE: 18 BRPM | DIASTOLIC BLOOD PRESSURE: 63 MMHG | OXYGEN SATURATION: 98 % | BODY MASS INDEX: 30.05 KG/M2 | TEMPERATURE: 98 F

## 2022-04-19 DIAGNOSIS — S80.11XA CONTUSION OF RIGHT LEG, INITIAL ENCOUNTER: ICD-10-CM

## 2022-04-19 PROCEDURE — 99283 EMERGENCY DEPT VISIT LOW MDM: CPT

## 2022-04-19 PROCEDURE — 73552 X-RAY EXAM OF FEMUR 2/>: CPT

## 2022-04-19 PROCEDURE — 73502 X-RAY EXAM HIP UNI 2-3 VIEWS: CPT

## 2022-04-19 RX ORDER — ACETAMINOPHEN AND CODEINE PHOSPHATE 300; 30 MG/1; MG/1
1 TABLET ORAL EVERY 4 HOURS PRN
Qty: 12 TABLET | Refills: 0 | Status: SHIPPED | OUTPATIENT
Start: 2022-04-19

## 2022-04-19 NOTE — DISCHARGE INSTRUCTIONS
Return to ER if symptoms worsen   Ice to area for 24 hours, then moist heat compresses three times a day for 15-20 min intervals  Follow up with primary care provider in 3 days if no improvement

## 2022-04-19 NOTE — ED PROVIDER NOTES
Subjective   Patient is a 46-year-old white female presents the emergency department after falling down some stairs outside of her home today.  She states she was going outside to take a picture of her dog and slipped on the steps and fell landing on her right hip.  She is having pain in the right hip and the right femur area.  She denies any head or neck trauma.  She states she did bear weight but it was very painful she was unable to get up by herself.  She denies any other injury.      History provided by:  Patient   used: No        Review of Systems   Constitutional: Negative.    HENT: Negative.    Eyes: Negative.    Respiratory: Negative.    Cardiovascular: Negative.    Gastrointestinal: Negative.    Endocrine: Negative.    Genitourinary: Negative.    Musculoskeletal:        Patient is a 46-year-old white female presents the emergency department after falling down some stairs outside of her home today.  She states she was going outside to take a picture of her dog and slipped on the steps and fell landing on her right hip.  She is having pain in the right hip and the right femur area.  She denies any head or neck trauma.  She states she did bear weight but it was very painful she was unable to get up by herself.  She denies any other injury.     Skin: Negative.    Allergic/Immunologic: Negative.    Neurological: Negative.    Hematological: Negative.    Psychiatric/Behavioral: Negative.    All other systems reviewed and are negative.      Past Medical History:   Diagnosis Date   • Anxiety    • Arrhythmia    • Hyperlipidemia    • Migraine    • Seasonal allergies        No Known Allergies    Past Surgical History:   Procedure Laterality Date   • APPENDECTOMY     • ARM LESION/CYST EXCISION Left 1/21/2022    Procedure: LEFT RING FINGER NAILBED LESION EXCISIONAL BIOPSY;  Surgeon: Harry Holt MD;  Location: [facility];  Service: Plastics;  Laterality: Left;   • LAPAROSCOPIC  SALPINGOOPHERECTOMY Bilateral    • OOPHORECTOMY     • TOTAL ABDOMINAL HYSTERECTOMY      RIVKA w/ BSO due to pain caused by varicose veins and heavy bleeding.   • TUBAL ABDOMINAL LIGATION         Family History   Problem Relation Age of Onset   • Ovarian cancer Mother    • Breast cancer Paternal Grandmother    • Breast cancer Maternal Grandmother    • Uterine cancer Maternal Aunt    • Breast cancer Maternal Aunt    • Heart disease Father    • Hyperlipidemia Father    • Diabetes Father    • Hypertension Father    • Colon cancer Neg Hx    • Melanoma Neg Hx        Social History     Socioeconomic History   • Marital status:    Tobacco Use   • Smoking status: Never Smoker   • Smokeless tobacco: Never Used   Vaping Use   • Vaping Use: Never used   Substance and Sexual Activity   • Alcohol use: Not Currently   • Drug use: Not Currently       Prior to Admission medications    Medication Sig Start Date End Date Taking? Authorizing Provider   APPLE CIDER VINEGAR PO Take 2 tablets by mouth Daily.    Petr Olson MD   Ascorbic Acid (VITAMIN C PO) Take 1 tablet by mouth Daily.    Petr Olson MD   atorvastatin (Lipitor) 40 MG tablet Take 1 tablet by mouth Every Night. 11/18/21   Fermín Rosas MD   azithromycin (Zithromax Z-Cole) 250 MG tablet Take 2 tablets the first day, then 1 tablet daily for 4 days. 3/3/22   Fermín Rosas MD   Cholecalciferol (Vitamin D3) 1.25 MG (25791 UT) capsule 5,000 Units Daily. 8/19/21   Petr Olson MD   escitalopram (LEXAPRO) 10 MG tablet Take 1 tablet by mouth Daily. 2/23/22   Fermín Rosas MD   estradiol (Estrace) 1 MG tablet Take 1 tablet by mouth Daily. 1/4/22   Lauren Lincoln APRN   levocetirizine (XYZAL) 5 MG tablet Take 5 mg by mouth Every Evening.    Petr Olson MD   montelukast (SINGULAIR) 10 MG tablet Take 1 tablet by mouth every night at bedtime. 3/7/22   Fermín Rosas MD   Multiple Vitamins-Minerals  "(ZINC PO) Take 1 tablet by mouth Daily.    Provider, MD Petr   Probiotic Product (Align) 4 MG capsule Take 2 tablets by mouth Daily.    Provider, MD Petr       /63 (BP Location: Left arm, Patient Position: Sitting)   Pulse 69   Temp 98 °F (36.7 °C) (Oral)   Resp 18   Ht 167.6 cm (66\")   Wt 84.8 kg (187 lb)   SpO2 98%   BMI 30.18 kg/m²     Objective   Physical Exam  Vitals and nursing note reviewed.   Constitutional:       Appearance: She is well-developed.   HENT:      Head: Normocephalic and atraumatic.   Eyes:      Conjunctiva/sclera: Conjunctivae normal.      Pupils: Pupils are equal, round, and reactive to light.   Neck:      Thyroid: No thyromegaly.      Trachea: No tracheal deviation.   Cardiovascular:      Rate and Rhythm: Normal rate and regular rhythm.      Heart sounds: Normal heart sounds.   Pulmonary:      Effort: Pulmonary effort is normal. No respiratory distress.      Breath sounds: Normal breath sounds. No wheezing or rales.   Chest:      Chest wall: No tenderness.   Musculoskeletal:      Cervical back: Normal range of motion and neck supple.      Comments: Right lower extremity: There is moderate tenderness noticed to the lateral aspect of the right thigh.  There is soft tissue swelling noted.  There is no obvious deformity noted.  Pedal pulses are palpable.   Skin:     General: Skin is warm and dry.   Neurological:      Mental Status: She is alert and oriented to person, place, and time.      Cranial Nerves: No cranial nerve deficit.      Deep Tendon Reflexes: Reflexes are normal and symmetric.   Psychiatric:         Behavior: Behavior normal.         Thought Content: Thought content normal.         Judgment: Judgment normal.         Procedures         Lab Results (last 24 hours)     ** No results found for the last 24 hours. **          XR Femur 2 View Right   Final Result   1. Unremarkable radiographs of the right femur.   This report was finalized on 04/19/2022 11:01 " by Dr. Noe Vargas MD.      XR Hip With or Without Pelvis 2 - 3 View Right   Final Result   No fracture, no acute osseous abnormality.   This report was finalized on 04/19/2022 11:16 by Dr. Fam Gutierrez MD.          ED Course  ED Course as of 04/19/22 1435   Tue Apr 19, 2022   1121 No fractures noted to the right hip or the right femur.  Will prescribe the patient some crutches.  Will prescribe small amount of pain medication for acute pain.  Reviewed side effects and potential for abuse.  Ej report completed and there is no signs of suspicious activity noted.  Advised to apply ice to the area for 24 hours then apply moist heat compresses thereafter.  Patient be discharged shortly in stable condition.  Advised to follow-up with her primary care doctor in 3 days if no improvement.  Advised to return before symptoms worsen. [CW]      ED Course User Index  [CW] Tricia Jimenes APRN          MDM  Number of Diagnoses or Management Options  Contusion of right leg, initial encounter: minor     Amount and/or Complexity of Data Reviewed  Tests in the radiology section of CPT®: ordered and reviewed    Patient Progress  Patient progress: stable      Final diagnoses:   Contusion of right leg, initial encounter          Tricia Jimenes APRN  04/19/22 1435

## 2022-06-13 ENCOUNTER — OFFICE VISIT (OUTPATIENT)
Dept: OBSTETRICS AND GYNECOLOGY | Facility: CLINIC | Age: 47
End: 2022-06-13

## 2022-06-13 VITALS
DIASTOLIC BLOOD PRESSURE: 72 MMHG | BODY MASS INDEX: 29.89 KG/M2 | WEIGHT: 186 LBS | HEIGHT: 66 IN | SYSTOLIC BLOOD PRESSURE: 128 MMHG

## 2022-06-13 DIAGNOSIS — Z01.419 WELL WOMAN EXAM WITH ROUTINE GYNECOLOGICAL EXAM: Primary | ICD-10-CM

## 2022-06-13 DIAGNOSIS — N95.1 MENOPAUSAL STATE: ICD-10-CM

## 2022-06-13 DIAGNOSIS — Z12.31 ENCOUNTER FOR SCREENING MAMMOGRAM FOR MALIGNANT NEOPLASM OF BREAST: ICD-10-CM

## 2022-06-13 PROCEDURE — 99396 PREV VISIT EST AGE 40-64: CPT | Performed by: NURSE PRACTITIONER

## 2022-06-13 RX ORDER — ESTRADIOL 1 MG/1
1 TABLET ORAL DAILY
Qty: 90 TABLET | Refills: 3 | Status: SHIPPED | OUTPATIENT
Start: 2022-06-13

## 2022-06-13 NOTE — PROGRESS NOTES
Subjective   Екатерина Stovall is a 46 y.o. female  YOB: 1975        Chief Complaint   Patient presents with   • Annual Exam     Patient is here for annual exam patient has had a hysterectomy last mammogram was performed on 06/28/21 and was normal. Patient has no complaints or concerns at this time.       Gynecologic Exam  The patient's pertinent negatives include no pelvic pain. Pertinent negatives include no abdominal pain, back pain, constipation, diarrhea, dysuria, fever, frequency, hematuria, nausea, rash, sore throat, urgency or vomiting.       The following portions of the patient's history were reviewed and updated as appropriate: allergies, current medications, past family history, past medical history, past social history, past surgical history, and problem list.    No Known Allergies    Past Medical History:   Diagnosis Date   • Anxiety    • Arrhythmia    • Hyperlipidemia    • Migraine    • Seasonal allergies        Family History   Problem Relation Age of Onset   • Ovarian cancer Mother    • Breast cancer Paternal Grandmother    • Breast cancer Maternal Grandmother    • Uterine cancer Maternal Aunt    • Breast cancer Maternal Aunt    • Heart disease Father    • Hyperlipidemia Father    • Diabetes Father    • Hypertension Father    • Colon cancer Neg Hx    • Melanoma Neg Hx        Social History     Socioeconomic History   • Marital status:    Tobacco Use   • Smoking status: Never Smoker   • Smokeless tobacco: Never Used   Vaping Use   • Vaping Use: Never used   Substance and Sexual Activity   • Alcohol use: Not Currently   • Drug use: Not Currently         Current Outpatient Medications:   •  acetaminophen-codeine (TYLENOL #3) 300-30 MG per tablet, Take 1 tablet by mouth Every 4 (Four) Hours As Needed for Moderate Pain ., Disp: 12 tablet, Rfl: 0  •  APPLE CIDER VINEGAR PO, Take 2 tablets by mouth Daily., Disp: , Rfl:   •  Ascorbic Acid (VITAMIN C PO), Take 1 tablet by mouth Daily.,  Disp: , Rfl:   •  atorvastatin (Lipitor) 40 MG tablet, Take 1 tablet by mouth Every Night., Disp: 90 tablet, Rfl: 3  •  Cholecalciferol (Vitamin D3) 1.25 MG (54728 UT) capsule, 5,000 Units Daily., Disp: , Rfl:   •  escitalopram (LEXAPRO) 10 MG tablet, Take 1 tablet by mouth Daily., Disp: 90 tablet, Rfl: 3  •  estradiol (Estrace) 1 MG tablet, Take 1 tablet by mouth Daily., Disp: 90 tablet, Rfl: 3  •  levocetirizine (XYZAL) 5 MG tablet, Take 5 mg by mouth Every Evening., Disp: , Rfl:   •  montelukast (SINGULAIR) 10 MG tablet, Take 1 tablet by mouth every night at bedtime., Disp: 90 tablet, Rfl: 3  •  Multiple Vitamins-Minerals (ZINC PO), Take 1 tablet by mouth Daily., Disp: , Rfl:   •  Probiotic Product (Align) 4 MG capsule, Take 2 tablets by mouth Daily., Disp: , Rfl:   •  azithromycin (Zithromax Z-Cole) 250 MG tablet, Take 2 tablets the first day, then 1 tablet daily for 4 days., Disp: 6 tablet, Rfl: 0    No LMP recorded. Patient has had a hysterectomy.    Sexual History:           Could not be calculated    Past Surgical History:   Procedure Laterality Date   • APPENDECTOMY     • ARM LESION/CYST EXCISION Left 01/21/2022    Procedure: LEFT RING FINGER NAILBED LESION EXCISIONAL BIOPSY;  Surgeon: Harry Holt MD;  Location: [facility];  Service: Plastics;  Laterality: Left;   • FINGER MASS EXCISION     • LAPAROSCOPIC SALPINGOOPHERECTOMY Bilateral    • OOPHORECTOMY     • TOTAL ABDOMINAL HYSTERECTOMY      RIVKA w/ BSO due to pain caused by varicose veins and heavy bleeding.   • TUBAL ABDOMINAL LIGATION         Review of Systems   Constitutional: Negative for activity change, appetite change, fatigue, fever, unexpected weight gain and unexpected weight loss.   HENT: Negative for congestion, ear pain, hearing loss, nosebleeds, rhinorrhea, sore throat, tinnitus and trouble swallowing.    Eyes: Negative for blurred vision, pain, discharge, itching and visual disturbance.   Respiratory: Negative for apnea, chest tightness,  shortness of breath and wheezing.    Cardiovascular: Negative for chest pain and leg swelling.   Gastrointestinal: Negative for abdominal pain, blood in stool, constipation, diarrhea, nausea, vomiting and GERD.   Endocrine: Negative for heat intolerance, polydipsia and polyuria.   Genitourinary: Negative for breast lump, decreased libido, difficulty urinating, dyspareunia, dysuria, frequency, genital sores, hematuria, menstrual problem, pelvic pain, urgency, urinary incontinence and vaginal pain.   Musculoskeletal: Negative for arthralgias, back pain, joint swelling and myalgias.   Skin: Negative for color change, rash and skin lesions.   Allergic/Immunologic: Negative for environmental allergies, food allergies and immunocompromised state.   Neurological: Negative for dizziness, tremors, seizures, syncope, facial asymmetry, numbness and headache.   Hematological: Negative for adenopathy. Does not bruise/bleed easily.   Psychiatric/Behavioral: Negative for agitation, hallucinations, sleep disturbance, suicidal ideas and depressed mood. The patient is not nervous/anxious.        Objective   Physical Exam  Vitals and nursing note reviewed. Exam conducted with a chaperone present.   Constitutional:       General: She is not in acute distress.     Appearance: She is well-developed. She is not diaphoretic.   HENT:      Head: Normocephalic.      Right Ear: External ear normal.      Left Ear: External ear normal.      Nose: Nose normal.   Eyes:      General: No scleral icterus.        Right eye: No discharge.         Left eye: No discharge.      Conjunctiva/sclera: Conjunctivae normal.      Pupils: Pupils are equal, round, and reactive to light.   Neck:      Thyroid: No thyromegaly.      Vascular: No carotid bruit.      Trachea: No tracheal deviation.   Cardiovascular:      Rate and Rhythm: Normal rate and regular rhythm.      Heart sounds: Normal heart sounds. No murmur heard.  Pulmonary:      Effort: Pulmonary effort is  normal. No respiratory distress.      Breath sounds: Normal breath sounds. No wheezing.   Chest:   Breasts: Breasts are symmetrical.      Right: Normal. No swelling, bleeding, inverted nipple, mass, nipple discharge, skin change, tenderness, axillary adenopathy or supraclavicular adenopathy.      Left: Normal. No swelling, bleeding, inverted nipple, mass, nipple discharge, skin change, tenderness, axillary adenopathy or supraclavicular adenopathy.       Abdominal:      General: There is no distension.      Palpations: Abdomen is soft. There is no mass.      Tenderness: There is no abdominal tenderness. There is no right CVA tenderness, left CVA tenderness or guarding.      Hernia: No hernia is present. There is no hernia in the left inguinal area or right inguinal area.   Genitourinary:     General: Normal vulva.      Exam position: Lithotomy position.      Labia:         Right: No rash, tenderness, lesion or injury.         Left: No rash, tenderness, lesion or injury.       Vagina: Normal. No signs of injury and foreign body. No vaginal discharge, erythema, tenderness or bleeding.      Cervix: Normal.      Uterus: Normal. Not enlarged, not fixed and not tender.       Adnexa: Right adnexa normal and left adnexa normal.        Right: No mass, tenderness or fullness.          Left: No mass, tenderness or fullness.        Rectum: Normal. No mass.      Comments:   BSU normal  Urethral meatus  Normal  Perineum  Normal  Musculoskeletal:         General: No tenderness. Normal range of motion.      Cervical back: Normal range of motion and neck supple.   Lymphadenopathy:      Head:      Right side of head: No submental, submandibular, tonsillar, preauricular, posterior auricular or occipital adenopathy.      Left side of head: No submental, submandibular, tonsillar, preauricular, posterior auricular or occipital adenopathy.      Cervical: No cervical adenopathy.      Right cervical: No superficial, deep or posterior  "cervical adenopathy.     Left cervical: No superficial, deep or posterior cervical adenopathy.      Upper Body:      Right upper body: No supraclavicular, axillary or pectoral adenopathy.      Left upper body: No supraclavicular, axillary or pectoral adenopathy.      Lower Body: No right inguinal adenopathy. No left inguinal adenopathy.   Skin:     General: Skin is warm and dry.      Findings: No bruising, erythema or rash.   Neurological:      Mental Status: She is alert and oriented to person, place, and time.      Coordination: Coordination normal.   Psychiatric:         Mood and Affect: Mood normal.         Behavior: Behavior normal.         Thought Content: Thought content normal.         Judgment: Judgment normal.           Vitals:    06/13/22 0956   BP: 128/72   Weight: 84.4 kg (186 lb)   Height: 167.6 cm (66\")       Diagnoses and all orders for this visit:    1. Well woman exam with routine gynecological exam (Primary)  Comments:  Normal well woman exam.  History of hysterectomy related to menorrhagia.  Mammogram ordered.    2. Encounter for screening mammogram for malignant neoplasm of breast  Comments:  Mammogram ordered.  Orders:  -     Mammo screening digital tomosynthesis bilateral w CAD    3. Menopausal state  Comments:  Doing well on estradiol 1 mg and wants to remain on it.  Refill sent to pharmacy.  Orders:  -     estradiol (Estrace) 1 MG tablet; Take 1 tablet by mouth Daily.  Dispense: 90 tablet; Refill: 3        Normal GYN exam. Will have lab work here. Encouraged SBE.  Pt is aware how to do self breast exam and the importance of same. Discussed weight management and importance of maintaining a healthy weight. Discussed Vitamin D intake and the importance of adequate vitamin D for both bone health and a healthy immune system.  Discussed daily exercise and the importance of same in regards to a healthy heart as well as helping to maintain her weight and improving her mental health.  Body mass index " is 30.02 kg/m². Colonoscopy is up to date.  Mammogram will be scheduled at [facility]. Pap smear is done per ASCCP guidelines.    BMI is >= 30 and <35. (Class 1 Obesity). The following options were offered after discussion;: exercise counseling/recommendations and nutrition counseling/recommendations             Non-Smoker    MyChart Instructions Given

## 2022-06-27 ENCOUNTER — OFFICE VISIT (OUTPATIENT)
Dept: FAMILY MEDICINE CLINIC | Facility: CLINIC | Age: 47
End: 2022-06-27

## 2022-06-27 VITALS
SYSTOLIC BLOOD PRESSURE: 121 MMHG | DIASTOLIC BLOOD PRESSURE: 72 MMHG | BODY MASS INDEX: 30.25 KG/M2 | HEIGHT: 66 IN | HEART RATE: 65 BPM | TEMPERATURE: 98.2 F | OXYGEN SATURATION: 98 % | WEIGHT: 188.2 LBS

## 2022-06-27 DIAGNOSIS — R53.83 FATIGUE, UNSPECIFIED TYPE: ICD-10-CM

## 2022-06-27 DIAGNOSIS — M54.9 BACK PAIN, UNSPECIFIED BACK LOCATION, UNSPECIFIED BACK PAIN LATERALITY, UNSPECIFIED CHRONICITY: ICD-10-CM

## 2022-06-27 DIAGNOSIS — Z00.00 ROUTINE GENERAL MEDICAL EXAMINATION AT A HEALTH CARE FACILITY: Primary | ICD-10-CM

## 2022-06-27 DIAGNOSIS — I10 HYPERTENSION, UNSPECIFIED TYPE: ICD-10-CM

## 2022-06-27 LAB
BILIRUB BLD-MCNC: NEGATIVE MG/DL
CLARITY, POC: CLEAR
COLOR UR: YELLOW
GLUCOSE UR STRIP-MCNC: NEGATIVE MG/DL
KETONES UR QL: NEGATIVE
LEUKOCYTE EST, POC: NEGATIVE
NITRITE UR-MCNC: NEGATIVE MG/ML
PH UR: 6 [PH] (ref 5–8)
PROT UR STRIP-MCNC: NEGATIVE MG/DL
RBC # UR STRIP: NEGATIVE /UL
SP GR UR: 1.02 (ref 1–1.03)
UROBILINOGEN UR QL: NORMAL

## 2022-06-27 PROCEDURE — 81003 URINALYSIS AUTO W/O SCOPE: CPT | Performed by: FAMILY MEDICINE

## 2022-06-27 PROCEDURE — 99396 PREV VISIT EST AGE 40-64: CPT | Performed by: FAMILY MEDICINE

## 2022-06-27 NOTE — PROGRESS NOTES
Chief Complaint   Patient presents with   • Annual Exam     Fasting.  Gyn elsewhere       History:  Екатерина Stovall is a 46 y.o. female who presents today for evaluation of the above problems.      46-year-old female with hyperlipidemia for wellness exam        ROS:  Review of Systems   Respiratory: Negative for shortness of breath and stridor.    Cardiovascular: Negative for chest pain and leg swelling.   Gastrointestinal: Negative for abdominal distention.   Psychiatric/Behavioral: The patient is nervous/anxious.    All other systems reviewed and are negative.      No Known Allergies  Past Medical History:   Diagnosis Date   • Allergic 2011    Cats,horses,dogs, trees and seasonal allergies   • Anxiety    • Arrhythmia    • Hyperlipidemia    • Migraine    • Seasonal allergies      Past Surgical History:   Procedure Laterality Date   • APPENDECTOMY     • ARM LESION/CYST EXCISION Left 01/21/2022    Procedure: LEFT RING FINGER NAILBED LESION EXCISIONAL BIOPSY;  Surgeon: Harry Holt MD;  Location: [facility];  Service: Plastics;  Laterality: Left;   • FINGER MASS EXCISION     • LAPAROSCOPIC SALPINGOOPHERECTOMY Bilateral    • OOPHORECTOMY     • TOTAL ABDOMINAL HYSTERECTOMY      RIVKA w/ BSO due to pain caused by varicose veins and heavy bleeding.   • TUBAL ABDOMINAL LIGATION       Family History   Problem Relation Age of Onset   • Ovarian cancer Mother    • Arthritis Mother    • Asthma Mother    • Cancer Mother    • Thyroid disease Mother    • Breast cancer Paternal Grandmother    • Breast cancer Maternal Grandmother    • Cancer Maternal Grandmother    • Uterine cancer Maternal Aunt    • Breast cancer Maternal Aunt    • Cancer Maternal Aunt    • Heart disease Father    • Hyperlipidemia Father    • Diabetes Father    • Hypertension Father    • Cancer Maternal Aunt    • Colon cancer Neg Hx    • Melanoma Neg Hx       reports that she has never smoked. She has never used smokeless tobacco. She reports previous alcohol use.  "She reports that she does not use drugs.      Current Outpatient Medications:   •  APPLE CIDER VINEGAR PO, Take 2 tablets by mouth Daily., Disp: , Rfl:   •  Ascorbic Acid (VITAMIN C PO), Take 1 tablet by mouth Daily., Disp: , Rfl:   •  atorvastatin (Lipitor) 40 MG tablet, Take 1 tablet by mouth Every Night., Disp: 90 tablet, Rfl: 3  •  Cholecalciferol (Vitamin D3) 1.25 MG (46795 UT) capsule, 5,000 Units Daily., Disp: , Rfl:   •  escitalopram (LEXAPRO) 10 MG tablet, Take 1 tablet by mouth Daily., Disp: 90 tablet, Rfl: 3  •  estradiol (Estrace) 1 MG tablet, Take 1 tablet by mouth Daily., Disp: 90 tablet, Rfl: 3  •  levocetirizine (XYZAL) 5 MG tablet, Take 5 mg by mouth Every Evening., Disp: , Rfl:   •  montelukast (SINGULAIR) 10 MG tablet, Take 1 tablet by mouth every night at bedtime., Disp: 90 tablet, Rfl: 3  •  Multiple Vitamins-Minerals (ZINC PO), Take 1 tablet by mouth Daily., Disp: , Rfl:   •  Probiotic Product (Align) 4 MG capsule, Take 2 tablets by mouth Daily., Disp: , Rfl:   •  acetaminophen-codeine (TYLENOL #3) 300-30 MG per tablet, Take 1 tablet by mouth Every 4 (Four) Hours As Needed for Moderate Pain ., Disp: 12 tablet, Rfl: 0    OBJECTIVE:  /72 (BP Location: Left arm, Patient Position: Sitting, Cuff Size: Adult)   Pulse 65   Temp 98.2 °F (36.8 °C) (Temporal)   Ht 167.6 cm (66\")   Wt 85.4 kg (188 lb 3.2 oz)   SpO2 98%   Breastfeeding No   BMI 30.38 kg/m²    Physical Exam  Vitals and nursing note reviewed.   HENT:      Right Ear: Tympanic membrane and ear canal normal.      Left Ear: Tympanic membrane and ear canal normal.   Eyes:      Extraocular Movements: Extraocular movements intact.      Pupils: Pupils are equal, round, and reactive to light.   Neck:      Vascular: No carotid bruit.   Cardiovascular:      Rate and Rhythm: Normal rate and regular rhythm.      Pulses: Normal pulses.      Heart sounds: Normal heart sounds.   Pulmonary:      Effort: Pulmonary effort is normal.      Breath " sounds: Normal breath sounds.      Comments: GYN exam by another provider  Abdominal:      General: Abdomen is flat. There is no distension.      Palpations: Abdomen is soft. There is no mass.   Genitourinary:     Comments: By another provider  Musculoskeletal:      Right lower leg: No edema.      Left lower leg: No edema.   Lymphadenopathy:      Cervical: No cervical adenopathy.   Skin:     General: Skin is warm and dry.      Capillary Refill: Capillary refill takes less than 2 seconds.   Neurological:      General: No focal deficit present.      Mental Status: She is alert and oriented to person, place, and time.   Psychiatric:         Mood and Affect: Mood normal.         Behavior: Behavior normal.         Thought Content: Thought content normal.         Judgment: Judgment normal.         BMI is >= 30 and <35. (Class 1 Obesity). The following options were offered after discussion;: weight loss educational material (shared in after visit summary)      Health Maintenance:  Immunization History   Administered Date(s) Administered   • COVID-19 (MODERNA) 1st, 2nd, 3rd Dose Only 08/23/2021, 09/20/2021   • FluLaval/Fluarix/Fluzone >6 10/20/2021        Up-to-date    Assessment/Plan    Diagnoses and all orders for this visit:    1. Routine general medical examination at a health care facility (Primary)  -     TSH  -     T4, free  -     CBC & Differential  -     Comprehensive Metabolic Panel  -     Lipid Panel  -     TSH  -     T4, free    2. Hypertension, unspecified type    3. Back pain, unspecified back location, unspecified back pain laterality, unspecified chronicity  -     POC Urinalysis Dipstick, Multipro    4. Fatigue, unspecified type    Plan above check on colonoscopy covered by insurance      An After Visit Summary was printed and given to the patient at discharge.  Return in 6 months (on 12/27/2022).         Fermín Rosas MD 6/27/2022   Electronically signed.

## 2022-06-28 LAB
ALBUMIN SERPL-MCNC: 4.3 G/DL (ref 3.5–5.2)
ALBUMIN/GLOB SERPL: 2.3 G/DL
ALP SERPL-CCNC: 65 U/L (ref 39–117)
ALT SERPL-CCNC: 23 U/L (ref 1–33)
AST SERPL-CCNC: 20 U/L (ref 1–32)
BASOPHILS # BLD AUTO: 0.06 10*3/MM3 (ref 0–0.2)
BASOPHILS NFR BLD AUTO: 1.6 % (ref 0–1.5)
BILIRUB SERPL-MCNC: 0.6 MG/DL (ref 0–1.2)
BUN SERPL-MCNC: 7 MG/DL (ref 6–20)
BUN/CREAT SERPL: 9.2 (ref 7–25)
CALCIUM SERPL-MCNC: 9.4 MG/DL (ref 8.6–10.5)
CHLORIDE SERPL-SCNC: 106 MMOL/L (ref 98–107)
CHOLEST SERPL-MCNC: 115 MG/DL (ref 0–200)
CO2 SERPL-SCNC: 29.8 MMOL/L (ref 22–29)
CREAT SERPL-MCNC: 0.76 MG/DL (ref 0.57–1)
EGFRCR SERPLBLD CKD-EPI 2021: 97.4 ML/MIN/1.73
EOSINOPHIL # BLD AUTO: 0.06 10*3/MM3 (ref 0–0.4)
EOSINOPHIL NFR BLD AUTO: 1.6 % (ref 0.3–6.2)
ERYTHROCYTE [DISTWIDTH] IN BLOOD BY AUTOMATED COUNT: 12.4 % (ref 12.3–15.4)
GLOBULIN SER CALC-MCNC: 1.9 GM/DL
GLUCOSE SERPL-MCNC: 98 MG/DL (ref 65–99)
HCT VFR BLD AUTO: 43.3 % (ref 34–46.6)
HDLC SERPL-MCNC: 47 MG/DL (ref 40–60)
HGB BLD-MCNC: 14 G/DL (ref 12–15.9)
IMM GRANULOCYTES # BLD AUTO: 0 10*3/MM3 (ref 0–0.05)
IMM GRANULOCYTES NFR BLD AUTO: 0 % (ref 0–0.5)
LDLC SERPL CALC-MCNC: 53 MG/DL (ref 0–100)
LYMPHOCYTES # BLD AUTO: 0.99 10*3/MM3 (ref 0.7–3.1)
LYMPHOCYTES NFR BLD AUTO: 26.7 % (ref 19.6–45.3)
MCH RBC QN AUTO: 29.4 PG (ref 26.6–33)
MCHC RBC AUTO-ENTMCNC: 32.3 G/DL (ref 31.5–35.7)
MCV RBC AUTO: 91 FL (ref 79–97)
MONOCYTES # BLD AUTO: 0.47 10*3/MM3 (ref 0.1–0.9)
MONOCYTES NFR BLD AUTO: 12.7 % (ref 5–12)
NEUTROPHILS # BLD AUTO: 2.13 10*3/MM3 (ref 1.7–7)
NEUTROPHILS NFR BLD AUTO: 57.4 % (ref 42.7–76)
NRBC BLD AUTO-RTO: 0 /100 WBC (ref 0–0.2)
PLATELET # BLD AUTO: 251 10*3/MM3 (ref 140–450)
POTASSIUM SERPL-SCNC: 4.4 MMOL/L (ref 3.5–5.2)
PROT SERPL-MCNC: 6.2 G/DL (ref 6–8.5)
RBC # BLD AUTO: 4.76 10*6/MM3 (ref 3.77–5.28)
SODIUM SERPL-SCNC: 144 MMOL/L (ref 136–145)
T4 FREE SERPL-MCNC: 0.97 NG/DL (ref 0.93–1.7)
TRIGL SERPL-MCNC: 72 MG/DL (ref 0–150)
TSH SERPL DL<=0.005 MIU/L-ACNC: 1.15 UIU/ML (ref 0.27–4.2)
VLDLC SERPL CALC-MCNC: 15 MG/DL (ref 5–40)
WBC # BLD AUTO: 3.71 10*3/MM3 (ref 3.4–10.8)

## 2022-07-18 ENCOUNTER — HOSPITAL ENCOUNTER (OUTPATIENT)
Dept: MAMMOGRAPHY | Facility: HOSPITAL | Age: 47
Discharge: HOME | End: 2022-07-18
Admitting: NURSE PRACTITIONER

## 2022-07-18 PROCEDURE — 77063 BREAST TOMOSYNTHESIS BI: CPT

## 2022-07-18 PROCEDURE — 77067 SCR MAMMO BI INCL CAD: CPT

## 2022-07-20 ENCOUNTER — TELEMEDICINE (OUTPATIENT)
Dept: FAMILY MEDICINE CLINIC | Facility: CLINIC | Age: 47
End: 2022-07-20

## 2022-07-20 DIAGNOSIS — U07.1 COVID: Primary | ICD-10-CM

## 2022-07-20 PROCEDURE — 99213 OFFICE O/P EST LOW 20 MIN: CPT | Performed by: NURSE PRACTITIONER

## 2022-07-20 RX ORDER — GUAIFENESIN 600 MG/1
1200 TABLET, EXTENDED RELEASE ORAL 2 TIMES DAILY
Qty: 28 TABLET | Refills: 0 | Status: SHIPPED | OUTPATIENT
Start: 2022-07-20

## 2022-07-20 NOTE — PROGRESS NOTES
CC: positive covid home test    History:  Екатерина Stovall is a 46 y.o. female who presents today for evaluation of the above problems.      Symptoms started yesterday.  Felt a tickle in her throat. Started getting, body aches, chills and headache and cough during the night. Home test was positive this morning. She does have a pulse ox at home. Hasn't checked sat.   HPI  ROS:  Review of Systems   Constitutional: Positive for chills and fatigue. Negative for fever.   HENT: Positive for congestion.    Respiratory: Positive for cough.    Musculoskeletal: Positive for myalgias.   Neurological: Positive for headaches.       No Known Allergies  Past Medical History:   Diagnosis Date   • Allergic 2011    Cats,horses,dogs, trees and seasonal allergies   • Anxiety    • Arrhythmia    • Hyperlipidemia    • Migraine    • Seasonal allergies      Past Surgical History:   Procedure Laterality Date   • APPENDECTOMY     • ARM LESION/CYST EXCISION Left 01/21/2022    Procedure: LEFT RING FINGER NAILBED LESION EXCISIONAL BIOPSY;  Surgeon: Harry Holt MD;  Location: [facility];  Service: Plastics;  Laterality: Left;   • FINGER MASS EXCISION     • LAPAROSCOPIC SALPINGOOPHERECTOMY Bilateral    • OOPHORECTOMY     • TOTAL ABDOMINAL HYSTERECTOMY      RIVKA w/ BSO due to pain caused by varicose veins and heavy bleeding.   • TUBAL ABDOMINAL LIGATION       Family History   Problem Relation Age of Onset   • Ovarian cancer Mother    • Arthritis Mother    • Asthma Mother    • Cancer Mother    • Thyroid disease Mother    • Breast cancer Paternal Grandmother    • Breast cancer Maternal Grandmother    • Cancer Maternal Grandmother    • Uterine cancer Maternal Aunt    • Breast cancer Maternal Aunt    • Cancer Maternal Aunt    • Heart disease Father    • Hyperlipidemia Father    • Diabetes Father    • Hypertension Father    • Cancer Maternal Aunt    • Colon cancer Neg Hx    • Melanoma Neg Hx       reports that she has never smoked. She has never used  smokeless tobacco. She reports previous alcohol use. She reports that she does not use drugs.      Current Outpatient Medications:   •  acetaminophen-codeine (TYLENOL #3) 300-30 MG per tablet, Take 1 tablet by mouth Every 4 (Four) Hours As Needed for Moderate Pain ., Disp: 12 tablet, Rfl: 0  •  APPLE CIDER VINEGAR PO, Take 2 tablets by mouth Daily., Disp: , Rfl:   •  Ascorbic Acid (VITAMIN C PO), Take 1 tablet by mouth Daily., Disp: , Rfl:   •  atorvastatin (Lipitor) 40 MG tablet, Take 1 tablet by mouth Every Night., Disp: 90 tablet, Rfl: 3  •  Cholecalciferol (Vitamin D3) 1.25 MG (99322 UT) capsule, 5,000 Units Daily., Disp: , Rfl:   •  escitalopram (LEXAPRO) 10 MG tablet, Take 1 tablet by mouth Daily., Disp: 90 tablet, Rfl: 3  •  estradiol (Estrace) 1 MG tablet, Take 1 tablet by mouth Daily., Disp: 90 tablet, Rfl: 3  •  guaiFENesin (Mucinex) 600 MG 12 hr tablet, Take 2 tablets by mouth 2 (Two) Times a Day., Disp: 28 tablet, Rfl: 0  •  levocetirizine (XYZAL) 5 MG tablet, Take 5 mg by mouth Every Evening., Disp: , Rfl:   •  montelukast (SINGULAIR) 10 MG tablet, Take 1 tablet by mouth every night at bedtime., Disp: 90 tablet, Rfl: 3  •  Multiple Vitamins-Minerals (ZINC PO), Take 1 tablet by mouth Daily., Disp: , Rfl:   •  Nirmatrelvir & Ritonavir (PAXLOVID) 20 x 150 MG & 10 x 100MG tablet therapy pack tablet, Take 3 tablets by mouth 2 (Two) Times a Day for 5 days., Disp: 30 tablet, Rfl: 0  •  Probiotic Product (Align) 4 MG capsule, Take 2 tablets by mouth Daily., Disp: , Rfl:     OBJECTIVE:  There were no vitals taken for this visit.   Physical Exam  Constitutional:       Appearance: She is well-developed.   Pulmonary:      Effort: Pulmonary effort is normal.   Neurological:      Mental Status: She is alert and oriented to person, place, and time.   Psychiatric:         Behavior: Behavior normal.         Assessment/Plan    Diagnoses and all orders for this visit:    1. COVID (Primary)  -     Nirmatrelvir & Ritonavir  (PAXLOVID) 20 x 150 MG & 10 x 100MG tablet therapy pack tablet; Take 3 tablets by mouth 2 (Two) Times a Day for 5 days.  Dispense: 30 tablet; Refill: 0  -     guaiFENesin (Mucinex) 600 MG 12 hr tablet; Take 2 tablets by mouth 2 (Two) Times a Day.  Dispense: 28 tablet; Refill: 0    Advised to hold atorvastatin during paxlovid therapy. Quarantine a minimum of 5 days or through day 10 if symptoms persist.     This visit was completed via secure Zoom connection.       An After Visit Summary was printed and given to the patient at discharge.  Return if symptoms worsen or fail to improve, for Next scheduled follow up.       BALDO Delgado 7/20/22    Electronically signed.

## 2022-08-01 ENCOUNTER — OFFICE VISIT (OUTPATIENT)
Dept: FAMILY MEDICINE CLINIC | Facility: CLINIC | Age: 47
End: 2022-08-01

## 2022-08-01 VITALS
HEART RATE: 90 BPM | BODY MASS INDEX: 29.25 KG/M2 | HEIGHT: 66 IN | DIASTOLIC BLOOD PRESSURE: 72 MMHG | OXYGEN SATURATION: 97 % | TEMPERATURE: 98.2 F | WEIGHT: 182 LBS | SYSTOLIC BLOOD PRESSURE: 111 MMHG

## 2022-08-01 DIAGNOSIS — U07.1 COVID: Primary | ICD-10-CM

## 2022-08-01 PROCEDURE — 99213 OFFICE O/P EST LOW 20 MIN: CPT | Performed by: NURSE PRACTITIONER

## 2022-08-01 RX ORDER — AZITHROMYCIN 250 MG/1
TABLET, FILM COATED ORAL
Qty: 6 TABLET | Refills: 0 | Status: SHIPPED | OUTPATIENT
Start: 2022-08-01

## 2022-08-01 RX ORDER — PREDNISONE 10 MG/1
TABLET ORAL
Qty: 21 TABLET | Refills: 0 | Status: SHIPPED | OUTPATIENT
Start: 2022-08-01

## 2022-08-01 NOTE — PROGRESS NOTES
CC: COVID rebound    History:  Екатерина Stovall is a 46 y.o. female who presents today for evaluation of the above problems.      Patients COVID symptoms returned on Saturday and she had a positive home test on Sunday. Does report coughing and congestion with mild shortness of breath.   Was treated previously with Paxlovid following her diagnosis on 7/20/22.         HPI  ROS:  Review of Systems   Constitutional: Negative for fever.   HENT: Positive for congestion.    Respiratory: Positive for cough and shortness of breath.    Musculoskeletal: Positive for myalgias.       No Known Allergies  Past Medical History:   Diagnosis Date   • Allergic 2011    Cats,horses,dogs, trees and seasonal allergies   • Anxiety    • Arrhythmia    • COVID-19 07/2022   • Hyperlipidemia    • Migraine    • Seasonal allergies      Past Surgical History:   Procedure Laterality Date   • APPENDECTOMY     • ARM LESION/CYST EXCISION Left 01/21/2022    Procedure: LEFT RING FINGER NAILBED LESION EXCISIONAL BIOPSY;  Surgeon: Harry Holt MD;  Location: [facility];  Service: Plastics;  Laterality: Left;   • FINGER MASS EXCISION     • LAPAROSCOPIC SALPINGOOPHERECTOMY Bilateral    • OOPHORECTOMY     • TOTAL ABDOMINAL HYSTERECTOMY      RIVKA w/ BSO due to pain caused by varicose veins and heavy bleeding.   • TUBAL ABDOMINAL LIGATION       Family History   Problem Relation Age of Onset   • Ovarian cancer Mother    • Arthritis Mother    • Asthma Mother    • Cancer Mother    • Thyroid disease Mother    • Breast cancer Paternal Grandmother    • Breast cancer Maternal Grandmother    • Cancer Maternal Grandmother    • Uterine cancer Maternal Aunt    • Breast cancer Maternal Aunt    • Cancer Maternal Aunt    • Heart disease Father    • Hyperlipidemia Father    • Diabetes Father    • Hypertension Father    • Cancer Maternal Aunt    • Colon cancer Neg Hx    • Melanoma Neg Hx       reports that she has never smoked. She has never used smokeless tobacco. She  "reports previous alcohol use. She reports that she does not use drugs.      Current Outpatient Medications:   •  acetaminophen-codeine (TYLENOL #3) 300-30 MG per tablet, Take 1 tablet by mouth Every 4 (Four) Hours As Needed for Moderate Pain ., Disp: 12 tablet, Rfl: 0  •  APPLE CIDER VINEGAR PO, Take 2 tablets by mouth Daily., Disp: , Rfl:   •  Ascorbic Acid (VITAMIN C PO), Take 1 tablet by mouth Daily., Disp: , Rfl:   •  atorvastatin (Lipitor) 40 MG tablet, Take 1 tablet by mouth Every Night., Disp: 90 tablet, Rfl: 3  •  Cholecalciferol (Vitamin D3) 1.25 MG (75447 UT) capsule, 5,000 Units Daily., Disp: , Rfl:   •  escitalopram (LEXAPRO) 10 MG tablet, Take 1 tablet by mouth Daily., Disp: 90 tablet, Rfl: 3  •  estradiol (Estrace) 1 MG tablet, Take 1 tablet by mouth Daily., Disp: 90 tablet, Rfl: 3  •  guaiFENesin (Mucinex) 600 MG 12 hr tablet, Take 2 tablets by mouth 2 (Two) Times a Day., Disp: 28 tablet, Rfl: 0  •  levocetirizine (XYZAL) 5 MG tablet, Take 5 mg by mouth Every Evening., Disp: , Rfl:   •  montelukast (SINGULAIR) 10 MG tablet, Take 1 tablet by mouth every night at bedtime., Disp: 90 tablet, Rfl: 3  •  Multiple Vitamins-Minerals (ZINC PO), Take 1 tablet by mouth Daily., Disp: , Rfl:   •  Probiotic Product (Align) 4 MG capsule, Take 2 tablets by mouth Daily., Disp: , Rfl:   •  azithromycin (Zithromax) 250 MG tablet, Take 2 tablets the first day, then 1 tablet daily for 4 days., Disp: 6 tablet, Rfl: 0  •  predniSONE (DELTASONE) 10 MG (21) dose pack, Use as directed on package, Disp: 21 tablet, Rfl: 0    OBJECTIVE:  /72 (BP Location: Left arm, Patient Position: Sitting, Cuff Size: Adult)   Pulse 90   Temp 98.2 °F (36.8 °C) (Temporal)   Ht 167.6 cm (66\")   Wt 82.6 kg (182 lb)   SpO2 97%   Breastfeeding No   BMI 29.38 kg/m²    Physical Exam  Vitals reviewed.   Constitutional:       Appearance: She is well-developed.   Cardiovascular:      Rate and Rhythm: Normal rate and regular rhythm.      Heart " sounds: Normal heart sounds.   Pulmonary:      Effort: Pulmonary effort is normal.      Breath sounds: Normal breath sounds.   Neurological:      Mental Status: She is alert and oriented to person, place, and time.   Psychiatric:         Behavior: Behavior normal.         Assessment/Plan    Diagnoses and all orders for this visit:    1. COVID (Primary)  -     azithromycin (Zithromax) 250 MG tablet; Take 2 tablets the first day, then 1 tablet daily for 4 days.  Dispense: 6 tablet; Refill: 0  -     predniSONE (DELTASONE) 10 MG (21) dose pack; Use as directed on package  Dispense: 21 tablet; Refill: 0    Advised to reisolate for 5 additional days from the start of symptoms.     An After Visit Summary was printed and given to the patient at discharge.  Return if symptoms worsen or fail to improve, for Next scheduled follow up.       BALDO Delgado 8/1/22    Electronically signed.

## 2022-11-21 DIAGNOSIS — E78.2 MIXED HYPERLIPIDEMIA: ICD-10-CM

## 2022-11-21 RX ORDER — ATORVASTATIN CALCIUM 40 MG/1
40 TABLET, FILM COATED ORAL NIGHTLY
Qty: 90 TABLET | Refills: 1 | OUTPATIENT
Start: 2022-11-21

## 2022-11-21 RX ORDER — ATORVASTATIN CALCIUM 40 MG/1
40 TABLET, FILM COATED ORAL NIGHTLY
Qty: 90 TABLET | Refills: 1 | Status: SHIPPED | OUTPATIENT
Start: 2022-11-21

## 2022-11-21 NOTE — TELEPHONE ENCOUNTER
Rx Refill Note  Requested Prescriptions     Pending Prescriptions Disp Refills   • atorvastatin (LIPITOR) 40 MG tablet [Pharmacy Med Name: atorvastatin 40 mg tablet] 90 tablet 3     Sig: TAKE 1 TABLET BY MOUTH EVERY NIGHT      Last office visit with prescribing clinician: 6/27/2022      Next office visit with prescribing clinician: 12/27/2022   CPE done 06/28/2022    {TIP  Please add Last Relevant Lab Date if appropriate: 06/27/2022  {TIP  Is Refill Pharmacy correct? Yes    Deisi Piedra MA  11/21/22, 09:26 CST

## 2022-11-21 NOTE — TELEPHONE ENCOUNTER
Caller: Екатерина Stovall RAMSES    Relationship: Self    Best call back number:[phone]    Requested Prescriptions:   Requested Prescriptions     Pending Prescriptions Disp Refills   • atorvastatin (LIPITOR) 40 MG tablet 90 tablet 1     Sig: Take 1 tablet by mouth Every Night.        Pharmacy where request should be sent: [address] [phone]  - [phone]      THE PATIENT STATES THAT SHE WOULD LIKE A 90 DAY SUPPLY    Does the patient have less than a 3 day supply:  [x] Yes  [] No    Kaushal Mckinney Rep   11/21/22 13:23 CST

## 2023-01-03 ENCOUNTER — OFFICE VISIT (OUTPATIENT)
Dept: FAMILY MEDICINE CLINIC | Facility: CLINIC | Age: 48
End: 2023-01-03
Payer: COMMERCIAL

## 2023-01-03 VITALS
OXYGEN SATURATION: 97 % | TEMPERATURE: 97.7 F | SYSTOLIC BLOOD PRESSURE: 115 MMHG | HEART RATE: 71 BPM | DIASTOLIC BLOOD PRESSURE: 78 MMHG | HEIGHT: 66 IN | BODY MASS INDEX: 30.08 KG/M2 | WEIGHT: 187.2 LBS

## 2023-01-03 DIAGNOSIS — E78.2 MIXED HYPERLIPIDEMIA: Primary | ICD-10-CM

## 2023-01-03 PROCEDURE — 99213 OFFICE O/P EST LOW 20 MIN: CPT | Performed by: FAMILY MEDICINE

## 2023-01-03 NOTE — PROGRESS NOTES
Subjective   Екатерина Stovall is a 47 y.o. female.     History of Present Illness  47-year-old female with follow-up for hyperlipidemia-father recently experienced sudden death probably related to cardiac issue      The following portions of the patient's history were reviewed and updated as appropriate: allergies, current medications, past family history, past medical history, past social history, past surgical history and problem list.    Review of Systems   Respiratory: Negative for shortness of breath.    Cardiovascular: Negative for chest pain and leg swelling.   Musculoskeletal: Negative for myalgias.   Psychiatric/Behavioral: The patient is nervous/anxious.         Loss Father probable MI July this year       Objective   Physical Exam  Vitals and nursing note reviewed.   Constitutional:       Appearance: Normal appearance.   Eyes:      Extraocular Movements: Extraocular movements intact.      Pupils: Pupils are equal, round, and reactive to light.   Cardiovascular:      Rate and Rhythm: Normal rate.   Pulmonary:      Effort: Pulmonary effort is normal.      Breath sounds: Normal breath sounds.   Musculoskeletal:      Right lower leg: No edema.      Left lower leg: No edema.   Skin:     General: Skin is warm and dry.   Neurological:      General: No focal deficit present.      Mental Status: She is alert and oriented to person, place, and time.   Psychiatric:         Mood and Affect: Mood normal.         Behavior: Behavior normal.         Thought Content: Thought content normal.         Judgment: Judgment normal.         Assessment & Plan   Diagnoses and all orders for this visit:    1. Mixed hyperlipidemia (Primary)  -     Comprehensive Metabolic Panel  -     Lipid Panel         Plan above-advised flu shot and COVID vaccinations       Answers for HPI/ROS submitted by the patient on 12/27/2022  Please describe your symptoms.: Bloodwork  Have you had these symptoms before?: No  How long have you been having these  symptoms?: 1-4 days  Please describe any probable cause for these symptoms. : N/A  What is the primary reason for your visit?: Other

## 2023-01-04 DIAGNOSIS — E78.2 MIXED HYPERLIPIDEMIA: Primary | ICD-10-CM

## 2023-01-04 LAB
ALBUMIN SERPL-MCNC: 4.4 G/DL (ref 3.5–5.2)
ALBUMIN/GLOB SERPL: 2.2 G/DL
ALP SERPL-CCNC: 73 U/L (ref 39–117)
ALT SERPL-CCNC: 44 U/L (ref 1–33)
AST SERPL-CCNC: 32 U/L (ref 1–32)
BILIRUB SERPL-MCNC: 0.8 MG/DL (ref 0–1.2)
BUN SERPL-MCNC: 12 MG/DL (ref 6–20)
BUN/CREAT SERPL: 16 (ref 7–25)
CALCIUM SERPL-MCNC: 10 MG/DL (ref 8.6–10.5)
CHLORIDE SERPL-SCNC: 105 MMOL/L (ref 98–107)
CHOLEST SERPL-MCNC: 164 MG/DL (ref 0–200)
CO2 SERPL-SCNC: 30.6 MMOL/L (ref 22–29)
CREAT SERPL-MCNC: 0.75 MG/DL (ref 0.57–1)
EGFRCR SERPLBLD CKD-EPI 2021: 98.3 ML/MIN/1.73
GLOBULIN SER CALC-MCNC: 2 GM/DL
GLUCOSE SERPL-MCNC: 97 MG/DL (ref 65–99)
HDLC SERPL-MCNC: 42 MG/DL (ref 40–60)
LDLC SERPL CALC-MCNC: 104 MG/DL (ref 0–100)
POTASSIUM SERPL-SCNC: 4.5 MMOL/L (ref 3.5–5.2)
PROT SERPL-MCNC: 6.4 G/DL (ref 6–8.5)
SODIUM SERPL-SCNC: 143 MMOL/L (ref 136–145)
TRIGL SERPL-MCNC: 97 MG/DL (ref 0–150)
VLDLC SERPL CALC-MCNC: 18 MG/DL (ref 5–40)

## 2023-04-10 RX ORDER — MONTELUKAST SODIUM 10 MG/1
10 TABLET ORAL
Qty: 90 TABLET | Refills: 3 | Status: SHIPPED | OUTPATIENT
Start: 2023-04-10

## 2023-04-10 RX ORDER — ESCITALOPRAM OXALATE 10 MG/1
TABLET ORAL
Qty: 90 TABLET | Refills: 3 | Status: SHIPPED | OUTPATIENT
Start: 2023-04-10

## 2023-04-10 NOTE — TELEPHONE ENCOUNTER
Rx Refill Note  Requested Prescriptions     Pending Prescriptions Disp Refills   • montelukast (SINGULAIR) 10 MG tablet [Pharmacy Med Name: montelukast 10 mg tablet] 90 tablet 3     Sig: TAKE 1 TABLET BY MOUTH EVERY NIGHT AT BEDTIME   • escitalopram (LEXAPRO) 10 MG tablet [Pharmacy Med Name: escitalopram 10 mg tablet] 90 tablet 3     Sig: TAKE 1 TABLET BY MOUTH ONCE DAILY      Last office visit with prescribing clinician: 1/3/2023   Last telemedicine visit with prescribing clinician: 6/28/2023   Next office visit with prescribing clinician: 6/28/2023   {  Deisi Hilton MA  04/10/23, 09:43 CDT

## 2023-05-10 RX ORDER — MONTELUKAST SODIUM 10 MG/1
10 TABLET ORAL
Qty: 90 TABLET | Refills: 3 | Status: SHIPPED | OUTPATIENT
Start: 2023-05-10

## 2023-05-10 RX ORDER — ESCITALOPRAM OXALATE 10 MG/1
10 TABLET ORAL DAILY
Qty: 90 TABLET | Refills: 3 | Status: SHIPPED | OUTPATIENT
Start: 2023-05-10

## 2023-05-10 NOTE — TELEPHONE ENCOUNTER
Rx Refill Note  Requested Prescriptions     Pending Prescriptions Disp Refills   • montelukast (SINGULAIR) 10 MG tablet 90 tablet 3     Sig: Take 1 tablet by mouth every night at bedtime.   • escitalopram (LEXAPRO) 10 MG tablet 90 tablet 3     Sig: Take 1 tablet by mouth Daily.      Last office visit with prescribing clinician: 1/3/2023   Last telemedicine visit with prescribing clinician: 4/13/2023   Next office visit with prescribing clinician: 6/28/2023   {    Deisi Hilton MA  05/10/23, 12:31 CDT

## 2023-06-04 DIAGNOSIS — E78.2 MIXED HYPERLIPIDEMIA: ICD-10-CM

## 2023-06-05 RX ORDER — ATORVASTATIN CALCIUM 40 MG/1
40 TABLET, FILM COATED ORAL NIGHTLY
Qty: 90 TABLET | Refills: 2 | Status: SHIPPED | OUTPATIENT
Start: 2023-06-05

## 2023-06-05 NOTE — TELEPHONE ENCOUNTER
Rx Refill Note  Requested Prescriptions     Pending Prescriptions Disp Refills    atorvastatin (LIPITOR) 40 MG tablet [Pharmacy Med Name: atorvastatin 40 mg tablet] 90 tablet 1     Sig: TAKE 1 TABLET BY MOUTH EVERY NIGHT      Last office visit with prescribing clinician: 1/3/23   Last telemedicine visit with prescribing clinician: Visit date not found   Next office visit with prescribing clinician: 6/28/23    {TIP  Please add Last Relevant Lab 4/13/23    Deisi Hilton MA  06/05/23, 07:40 CDT

## 2023-07-24 ENCOUNTER — TRANSCRIBE ORDERS (OUTPATIENT)
Dept: ADMINISTRATIVE | Facility: HOSPITAL | Age: 48
End: 2023-07-24
Payer: COMMERCIAL

## 2023-07-24 DIAGNOSIS — Z12.31 ENCOUNTER FOR SCREENING MAMMOGRAM FOR MALIGNANT NEOPLASM OF BREAST: Primary | ICD-10-CM

## 2023-07-26 ENCOUNTER — OFFICE VISIT (OUTPATIENT)
Dept: OBSTETRICS AND GYNECOLOGY | Facility: CLINIC | Age: 48
End: 2023-07-26
Payer: COMMERCIAL

## 2023-07-26 ENCOUNTER — HOSPITAL ENCOUNTER (OUTPATIENT)
Dept: MAMMOGRAPHY | Facility: HOSPITAL | Age: 48
Discharge: HOME | End: 2023-07-26
Admitting: NURSE PRACTITIONER
Payer: COMMERCIAL

## 2023-07-26 VITALS
SYSTOLIC BLOOD PRESSURE: 112 MMHG | DIASTOLIC BLOOD PRESSURE: 80 MMHG | HEIGHT: 66 IN | WEIGHT: 190 LBS | BODY MASS INDEX: 30.53 KG/M2

## 2023-07-26 DIAGNOSIS — Z01.419 ENCOUNTER FOR WELL WOMAN EXAM WITH ROUTINE GYNECOLOGICAL EXAM: Primary | ICD-10-CM

## 2023-07-26 DIAGNOSIS — Z12.31 ENCOUNTER FOR SCREENING MAMMOGRAM FOR MALIGNANT NEOPLASM OF BREAST: ICD-10-CM

## 2023-07-26 DIAGNOSIS — Z12.31 ENCOUNTER FOR SCREENING MAMMOGRAM FOR BREAST CANCER: ICD-10-CM

## 2023-07-26 PROCEDURE — 77067 SCR MAMMO BI INCL CAD: CPT

## 2023-07-26 PROCEDURE — 77063 BREAST TOMOSYNTHESIS BI: CPT

## 2023-07-26 PROCEDURE — 99396 PREV VISIT EST AGE 40-64: CPT | Performed by: NURSE PRACTITIONER

## 2023-07-26 NOTE — PROGRESS NOTES
Subjective   Екатерина Stovall is a 47 y.o. female  YOB: 1975        Chief Complaint   Patient presents with    Gynecologic Exam     Patient here for annual, patient had a hysterectomy, last mammogram 7/18/22,         Gynecologic Exam  The patient's pertinent negatives include no pelvic pain. Pertinent negatives include no abdominal pain, back pain, constipation, diarrhea, dysuria, fever, frequency, hematuria, nausea, rash, sore throat, urgency or vomiting.     The following portions of the patient's history were reviewed and updated as appropriate: allergies, current medications, past family history, past medical history, past social history, past surgical history, and problem list.    No Known Allergies    Past Medical History:   Diagnosis Date    Allergic 2011    Cats,horses,dogs, trees and seasonal allergies    Anxiety     Arrhythmia     COVID-19 07/2022    Hyperlipidemia     Migraine     Seasonal allergies        Family History   Problem Relation Age of Onset    Ovarian cancer Mother     Arthritis Mother     Asthma Mother     Cancer Mother     Thyroid disease Mother     Breast cancer Paternal Grandmother     Breast cancer Maternal Grandmother     Cancer Maternal Grandmother     Uterine cancer Maternal Aunt     Breast cancer Maternal Aunt     Cancer Maternal Aunt     Heart disease Father     Hyperlipidemia Father     Diabetes Father     Hypertension Father     Cancer Maternal Aunt     Colon cancer Neg Hx     Melanoma Neg Hx        Social History     Socioeconomic History    Marital status:    Tobacco Use    Smoking status: Never    Smokeless tobacco: Never   Vaping Use    Vaping Use: Never used   Substance and Sexual Activity    Alcohol use: Not Currently     Comment: I very seldom have a glass of wine    Drug use: Never    Sexual activity: Yes     Partners: Male     Birth control/protection: Hysterectomy         Current Outpatient Medications:     acetaminophen-codeine (TYLENOL #3) 300-30  MG per tablet, Take 1 tablet by mouth Every 4 (Four) Hours As Needed for Moderate Pain ., Disp: 12 tablet, Rfl: 0    Ascorbic Acid (VITAMIN C PO), Take 1 tablet by mouth Daily., Disp: , Rfl:     atorvastatin (LIPITOR) 40 MG tablet, Take 1 tablet by mouth Every Night., Disp: 90 tablet, Rfl: 0    escitalopram (LEXAPRO) 10 MG tablet, Take 1 tablet by mouth Daily., Disp: 90 tablet, Rfl: 3    estradiol (Estrace) 1 MG tablet, Take 1 tablet by mouth Daily., Disp: 90 tablet, Rfl: 3    guaiFENesin (Mucinex) 600 MG 12 hr tablet, Take 2 tablets by mouth 2 (Two) Times a Day., Disp: 28 tablet, Rfl: 0    levocetirizine (XYZAL) 5 MG tablet, Take 1 tablet by mouth Every Evening., Disp: , Rfl:     montelukast (SINGULAIR) 10 MG tablet, Take 1 tablet by mouth every night at bedtime., Disp: 90 tablet, Rfl: 3    Multiple Vitamins-Minerals (ZINC PO), Take 1 tablet by mouth Daily., Disp: , Rfl:     Probiotic Product (Align) 4 MG capsule, Take 2 tablets by mouth Daily., Disp: , Rfl:     No LMP recorded. Patient has had a hysterectomy.    Sexual History:           Could not be calculated    Past Surgical History:   Procedure Laterality Date    APPENDECTOMY      ARM LESION/CYST EXCISION Left 01/21/2022    Procedure: LEFT RING FINGER NAILBED LESION EXCISIONAL BIOPSY;  Surgeon: Harry Holt MD;  Location: [facility];  Service: Plastics;  Laterality: Left;    FINGER MASS EXCISION      LAPAROSCOPIC SALPINGOOPHERECTOMY Bilateral     OOPHORECTOMY      TOTAL ABDOMINAL HYSTERECTOMY      RIVKA w/ BSO due to pain caused by varicose veins and heavy bleeding.    TUBAL ABDOMINAL LIGATION         Review of Systems   Constitutional:  Negative for activity change, appetite change, fatigue, fever, unexpected weight gain and unexpected weight loss.   HENT:  Negative for congestion, ear pain, hearing loss, nosebleeds, rhinorrhea, sore throat, tinnitus and trouble swallowing.    Eyes:  Negative for blurred vision, pain, discharge, itching and visual  disturbance.   Respiratory:  Negative for apnea, chest tightness, shortness of breath and wheezing.    Cardiovascular:  Negative for chest pain and leg swelling.   Gastrointestinal:  Negative for abdominal pain, blood in stool, constipation, diarrhea, nausea, vomiting and GERD.   Endocrine: Negative for heat intolerance, polydipsia and polyuria.   Genitourinary: Negative.  Negative for breast lump, decreased libido, difficulty urinating, dyspareunia, dysuria, frequency, genital sores, hematuria, menstrual problem, pelvic pain, urgency, urinary incontinence and vaginal pain.   Musculoskeletal:  Negative for arthralgias, back pain, joint swelling and myalgias.   Skin:  Negative for color change, rash and skin lesions.   Allergic/Immunologic: Negative for environmental allergies, food allergies and immunocompromised state.   Neurological:  Negative for dizziness, tremors, seizures, syncope, facial asymmetry, numbness and headache.   Hematological:  Negative for adenopathy. Does not bruise/bleed easily.   Psychiatric/Behavioral:  Negative for agitation, hallucinations, sleep disturbance, suicidal ideas and depressed mood. The patient is not nervous/anxious.      Objective   Physical Exam  Vitals reviewed.   Constitutional:       General: She is not in acute distress.     Appearance: She is well-developed. She is not ill-appearing.   HENT:      Head: Normocephalic.      Right Ear: External ear normal.      Left Ear: External ear normal.      Nose: Nose normal.      Mouth/Throat:      Pharynx: No oropharyngeal exudate.   Eyes:      General: No scleral icterus.        Right eye: No discharge.         Left eye: No discharge.      Conjunctiva/sclera: Conjunctivae normal.      Pupils: Pupils are equal, round, and reactive to light.   Neck:      Thyroid: No thyroid mass or thyromegaly.   Cardiovascular:      Rate and Rhythm: Normal rate and regular rhythm.      Heart sounds: Normal heart sounds. No murmur heard.  Pulmonary:     "  Effort: Pulmonary effort is normal. No respiratory distress.      Breath sounds: Normal breath sounds. No wheezing or rales.   Chest:      Chest wall: No tenderness.   Abdominal:      General: Bowel sounds are normal. There is no distension.      Palpations: Abdomen is soft. There is no mass.      Tenderness: There is no abdominal tenderness. There is no guarding or rebound.      Hernia: No hernia is present.   Genitourinary:     Exam position: Prone.      Labia:         Right: No rash, tenderness or lesion.         Left: No rash, tenderness, lesion or injury.       Vagina: Normal. No vaginal discharge or tenderness.      Cervix: No cervical motion tenderness, discharge or friability.      Uterus: Not enlarged and not tender.       Adnexa:         Right: No mass or tenderness.          Left: No mass or tenderness.        Comments: Urethra and urethral meatus normal.    Bladder - normal, no prolapse.  Perineum and rectum examined - intact and no lesions.    Musculoskeletal:         General: No tenderness or deformity. Normal range of motion.      Cervical back: Normal range of motion and neck supple.   Lymphadenopathy:      Cervical: No cervical adenopathy.   Skin:     General: Skin is warm and dry.      Coloration: Skin is not pale.      Findings: No erythema or rash.   Neurological:      Mental Status: She is alert and oriented to person, place, and time.      Motor: No abnormal muscle tone.      Coordination: Coordination normal.      Deep Tendon Reflexes: Reflexes are normal and symmetric.   Psychiatric:         Behavior: Behavior normal. Behavior is cooperative.         Thought Content: Thought content normal.         Judgment: Judgment normal.         Vitals:    07/26/23 1306   BP: 112/80   BP Location: Left arm   Patient Position: Sitting   Cuff Size: Adult   Weight: 86.2 kg (190 lb)   Height: 167.6 cm (66\")       Diagnoses and all orders for this visit:    1. Encounter for well woman exam with routine " gynecological exam (Primary)  Comments:  Normal well woman exam.  ThinPrep pap smear done.  Mammogram ordered.    2. Encounter for screening mammogram for breast cancer  Comments:  Mammogram ordered.        Normal GYN exam. Will have lab work here. Encouraged SBE.  Pt is aware how to do self breast exam and the importance of same. Discussed weight management and importance of maintaining a healthy weight. Discussed Vitamin D intake and the importance of adequate vitamin D for both bone health and a healthy immune system.  Discussed daily exercise and the importance of same in regards to a healthy heart as well as helping to maintain her weight and improving her mental health.  Body mass index is 30.67 kg/m². Colonoscopy is up to date.  Mammogram will be scheduled at [facility]. Pap smear is done per ASCCP guidelines.    BMI is >= 30 and <35. (Class 1 Obesity). The following options were offered after discussion;: exercise counseling/recommendations and nutrition counseling/recommendations             Non-Smoker    MyChart Instructions Given

## 2023-08-07 ENCOUNTER — OFFICE VISIT (OUTPATIENT)
Dept: FAMILY MEDICINE CLINIC | Facility: CLINIC | Age: 48
End: 2023-08-07
Payer: COMMERCIAL

## 2023-08-07 VITALS
SYSTOLIC BLOOD PRESSURE: 127 MMHG | HEART RATE: 68 BPM | TEMPERATURE: 97.7 F | DIASTOLIC BLOOD PRESSURE: 78 MMHG | WEIGHT: 193.8 LBS | BODY MASS INDEX: 31.15 KG/M2 | OXYGEN SATURATION: 98 % | HEIGHT: 66 IN

## 2023-08-07 DIAGNOSIS — R52 PAIN: Primary | ICD-10-CM

## 2023-08-07 DIAGNOSIS — M54.6 ACUTE BILATERAL THORACIC BACK PAIN: ICD-10-CM

## 2023-08-07 DIAGNOSIS — R91.1 PULMONARY NODULE, LEFT: ICD-10-CM

## 2023-08-07 LAB
BILIRUB BLD-MCNC: NEGATIVE MG/DL
CLARITY, POC: CLEAR
COLOR UR: YELLOW
GLUCOSE UR STRIP-MCNC: NEGATIVE MG/DL
KETONES UR QL: NEGATIVE
LEUKOCYTE EST, POC: NEGATIVE
NITRITE UR-MCNC: NEGATIVE MG/ML
PH UR: 8.5 [PH] (ref 5–8)
PROT UR STRIP-MCNC: NEGATIVE MG/DL
RBC # UR STRIP: NEGATIVE /UL
SP GR UR: 1.02 (ref 1–1.03)
UROBILINOGEN UR QL: ABNORMAL

## 2023-08-07 PROCEDURE — 81003 URINALYSIS AUTO W/O SCOPE: CPT | Performed by: FAMILY MEDICINE

## 2023-08-07 PROCEDURE — 99214 OFFICE O/P EST MOD 30 MIN: CPT | Performed by: FAMILY MEDICINE

## 2023-08-07 RX ORDER — PREDNISONE 10 MG/1
TABLET ORAL
Qty: 21 TABLET | Refills: 0 | Status: SHIPPED | OUTPATIENT
Start: 2023-08-07

## 2023-08-07 NOTE — PROGRESS NOTES
"Екатерина Stovall    1975    Chief Complaint   Patient presents with    Back Pain       Vitals:    08/07/23 0846   BP: 127/78   BP Location: Left arm   Patient Position: Sitting   Cuff Size: Adult   Pulse: 68   Temp: 97.7 øF (36.5 øC)   TempSrc: Temporal   SpO2: 98%   Weight: 87.9 kg (193 lb 12.8 oz)   Height: 167.6 cm (66\")   PainSc: 0-No pain       47-year-old female with progressive pain in her mid thoracic lower thoracic area into her upper abdomen and history of left upper lobe pulmonary nodule that has been stable      Review of Systems   Respiratory:  Negative for shortness of breath.    Cardiovascular:  Positive for chest pain. Negative for leg swelling.   Gastrointestinal:  Negative for abdominal pain and nausea.   Genitourinary:  Negative for difficulty urinating.   Musculoskeletal:  Positive for arthralgias and back pain. Negative for neck pain.        History of L5-S1 spondylolisthesis     Past Medical History:   Diagnosis Date    Allergic 2011    Cats,horses,dogs, trees and seasonal allergies    Anxiety     Arrhythmia     COVID-19 07/2022    Hyperlipidemia     Migraine     Seasonal allergies          Current Outpatient Medications:     Ascorbic Acid (VITAMIN C PO), Take 1 tablet by mouth Daily., Disp: , Rfl:     atorvastatin (LIPITOR) 40 MG tablet, Take 1 tablet by mouth Every Night., Disp: 90 tablet, Rfl: 0    escitalopram (LEXAPRO) 10 MG tablet, Take 1 tablet by mouth Daily., Disp: 90 tablet, Rfl: 3    estradiol (Estrace) 1 MG tablet, Take 1 tablet by mouth Daily., Disp: 90 tablet, Rfl: 3    guaiFENesin (Mucinex) 600 MG 12 hr tablet, Take 2 tablets by mouth 2 (Two) Times a Day., Disp: 28 tablet, Rfl: 0    levocetirizine (XYZAL) 5 MG tablet, Take 1 tablet by mouth Every Evening., Disp: , Rfl:     montelukast (SINGULAIR) 10 MG tablet, Take 1 tablet by mouth every night at bedtime., Disp: 90 tablet, Rfl: 3    Multiple Vitamins-Minerals (ZINC PO), Take 1 tablet by mouth Daily., Disp: , Rfl:     " Probiotic Product (Align) 4 MG capsule, Take 2 tablets by mouth Daily., Disp: , Rfl:     predniSONE (DELTASONE) 10 MG (21) dose pack, Use as directed on package, Disp: 21 tablet, Rfl: 0    No Known Allergies    There is no problem list on file for this patient.      Social History     Socioeconomic History    Marital status:    Tobacco Use    Smoking status: Never    Smokeless tobacco: Never   Vaping Use    Vaping Use: Never used   Substance and Sexual Activity    Alcohol use: Not Currently     Comment: I very seldom have a glass of wine    Drug use: Never    Sexual activity: Yes     Partners: Male     Birth control/protection: Hysterectomy       Past Surgical History:   Procedure Laterality Date    APPENDECTOMY      ARM LESION/CYST EXCISION Left 01/21/2022    Procedure: LEFT RING FINGER NAILBED LESION EXCISIONAL BIOPSY;  Surgeon: Harry Holt MD;  Location: [facility] OR;  Service: Plastics;  Laterality: Left;    FINGER MASS EXCISION      LAPAROSCOPIC SALPINGOOPHERECTOMY Bilateral     OOPHORECTOMY      TOTAL ABDOMINAL HYSTERECTOMY      RIVKA w/ BSO due to pain caused by varicose veins and heavy bleeding.    TUBAL ABDOMINAL LIGATION         Physical Exam  Vitals reviewed.   Constitutional:       Appearance: Normal appearance.   Eyes:      Extraocular Movements: Extraocular movements intact.      Pupils: Pupils are equal, round, and reactive to light.   Cardiovascular:      Rate and Rhythm: Normal rate and regular rhythm.   Pulmonary:      Effort: Pulmonary effort is normal.      Breath sounds: Normal breath sounds.   Abdominal:      General: Abdomen is flat. There is no distension.      Palpations: Abdomen is soft. There is no mass.      Tenderness: There is no right CVA tenderness or left CVA tenderness.   Musculoskeletal:      Right lower leg: No edema.      Left lower leg: No edema.   Skin:     General: Skin is warm and dry.   Neurological:      General: No focal deficit present.      Mental Status: She is  "alert and oriented to person, place, and time.   Psychiatric:         Mood and Affect: Mood normal.         Behavior: Behavior normal.         Thought Content: Thought content normal.         Judgment: Judgment normal.       Vitals:    08/07/23 0846   BP: 127/78   BP Location: Left arm   Patient Position: Sitting   Cuff Size: Adult   Pulse: 68   Temp: 97.7 øF (36.5 øC)   TempSrc: Temporal   SpO2: 98%   Weight: 87.9 kg (193 lb 12.8 oz)   Height: 167.6 cm (66\")             Diagnoses and all orders for this visit:    1. Pain (Primary)  -     CBC & Differential    2. Acute bilateral thoracic back pain  -     Comprehensive Metabolic Panel  -     POC Urinalysis Dipstick, Multipro  -     XR Spine Lumbar 4+ View; Future  -     XR Spine Thoracic 3 View; Future    3. Pulmonary nodule, left  -     CT Chest Without Contrast; Future    Other orders  -     predniSONE (DELTASONE) 10 MG (21) dose pack; Use as directed on package  Dispense: 21 tablet; Refill: 0        Problems Addressed this Visit    None  Visit Diagnoses       Pain    -  Primary    Relevant Orders    CBC & Differential    Acute bilateral thoracic back pain        Relevant Orders    Comprehensive Metabolic Panel    POC Urinalysis Dipstick, Multipro (Completed)    XR Spine Lumbar 4+ View    XR Spine Thoracic 3 View    Pulmonary nodule, left        Relevant Orders    CT Chest Without Contrast          Diagnoses         Codes Comments    Pain    -  Primary ICD-10-CM: R52  ICD-9-CM: 780.96     Acute bilateral thoracic back pain     ICD-10-CM: M54.6  ICD-9-CM: 724.1     Pulmonary nodule, left     ICD-10-CM: R91.1  ICD-9-CM: 793.11             Health Maintenance:  Immunization History   Administered Date(s) Administered    COVID-19 (MODERNA) 1st,2nd,3rd Dose Monovalent 08/23/2021, 09/20/2021    FluLaval/Fluzone >6mos 10/20/2021              Plan above-steroid Dosepak see back after x-rays to finalize symptoms                    Electronically signed by Fermín Morales " MD Alison 08/07/2023

## 2023-08-08 LAB
ALBUMIN SERPL-MCNC: 4.4 G/DL (ref 3.5–5.2)
ALBUMIN/GLOB SERPL: 2.6 G/DL
ALP SERPL-CCNC: 75 U/L (ref 39–117)
ALT SERPL-CCNC: 28 U/L (ref 1–33)
AST SERPL-CCNC: 25 U/L (ref 1–32)
BASOPHILS # BLD AUTO: 0.05 10*3/MM3 (ref 0–0.2)
BASOPHILS NFR BLD AUTO: 1 % (ref 0–1.5)
BILIRUB SERPL-MCNC: 0.4 MG/DL (ref 0–1.2)
BUN SERPL-MCNC: 11 MG/DL (ref 6–20)
BUN/CREAT SERPL: 12.5 (ref 7–25)
CALCIUM SERPL-MCNC: 10.4 MG/DL (ref 8.6–10.5)
CHLORIDE SERPL-SCNC: 106 MMOL/L (ref 98–107)
CO2 SERPL-SCNC: 29.9 MMOL/L (ref 22–29)
CREAT SERPL-MCNC: 0.88 MG/DL (ref 0.57–1)
EGFRCR SERPLBLD CKD-EPI 2021: 81.2 ML/MIN/1.73
EOSINOPHIL # BLD AUTO: 0.17 10*3/MM3 (ref 0–0.4)
EOSINOPHIL NFR BLD AUTO: 3.5 % (ref 0.3–6.2)
ERYTHROCYTE [DISTWIDTH] IN BLOOD BY AUTOMATED COUNT: 12.9 % (ref 12.3–15.4)
GLOBULIN SER CALC-MCNC: 1.7 GM/DL
GLUCOSE SERPL-MCNC: 93 MG/DL (ref 65–99)
HCT VFR BLD AUTO: 43.5 % (ref 34–46.6)
HGB BLD-MCNC: 14.4 G/DL (ref 12–15.9)
IMM GRANULOCYTES # BLD AUTO: 0.01 10*3/MM3 (ref 0–0.05)
IMM GRANULOCYTES NFR BLD AUTO: 0.2 % (ref 0–0.5)
LYMPHOCYTES # BLD AUTO: 1.33 10*3/MM3 (ref 0.7–3.1)
LYMPHOCYTES NFR BLD AUTO: 27.5 % (ref 19.6–45.3)
MCH RBC QN AUTO: 29.9 PG (ref 26.6–33)
MCHC RBC AUTO-ENTMCNC: 33.1 G/DL (ref 31.5–35.7)
MCV RBC AUTO: 90.2 FL (ref 79–97)
MONOCYTES # BLD AUTO: 0.59 10*3/MM3 (ref 0.1–0.9)
MONOCYTES NFR BLD AUTO: 12.2 % (ref 5–12)
NEUTROPHILS # BLD AUTO: 2.68 10*3/MM3 (ref 1.7–7)
NEUTROPHILS NFR BLD AUTO: 55.6 % (ref 42.7–76)
NRBC BLD AUTO-RTO: 0 /100 WBC (ref 0–0.2)
PLATELET # BLD AUTO: 269 10*3/MM3 (ref 140–450)
POTASSIUM SERPL-SCNC: 4.7 MMOL/L (ref 3.5–5.2)
PROT SERPL-MCNC: 6.1 G/DL (ref 6–8.5)
RBC # BLD AUTO: 4.82 10*6/MM3 (ref 3.77–5.28)
SODIUM SERPL-SCNC: 146 MMOL/L (ref 136–145)
WBC # BLD AUTO: 4.83 10*3/MM3 (ref 3.4–10.8)

## 2023-08-22 ENCOUNTER — HOSPITAL ENCOUNTER (OUTPATIENT)
Dept: CT IMAGING | Facility: HOSPITAL | Age: 48
Discharge: HOME | End: 2023-08-22
Payer: COMMERCIAL

## 2023-08-22 ENCOUNTER — HOSPITAL ENCOUNTER (OUTPATIENT)
Dept: GENERAL RADIOLOGY | Facility: HOSPITAL | Age: 48
Discharge: HOME | End: 2023-08-22
Payer: COMMERCIAL

## 2023-08-22 DIAGNOSIS — R91.1 PULMONARY NODULE, LEFT: ICD-10-CM

## 2023-08-22 DIAGNOSIS — M54.6 ACUTE BILATERAL THORACIC BACK PAIN: ICD-10-CM

## 2023-08-22 PROCEDURE — 72072 X-RAY EXAM THORAC SPINE 3VWS: CPT

## 2023-08-22 PROCEDURE — 71250 CT THORAX DX C-: CPT

## 2023-08-22 PROCEDURE — 72110 X-RAY EXAM L-2 SPINE 4/>VWS: CPT

## 2023-08-23 DIAGNOSIS — G89.29 CHRONIC BACK PAIN, UNSPECIFIED BACK LOCATION, UNSPECIFIED BACK PAIN LATERALITY: Primary | ICD-10-CM

## 2023-08-23 DIAGNOSIS — M54.9 CHRONIC BACK PAIN, UNSPECIFIED BACK LOCATION, UNSPECIFIED BACK PAIN LATERALITY: Primary | ICD-10-CM

## 2023-08-29 DIAGNOSIS — N95.1 MENOPAUSAL STATE: ICD-10-CM

## 2023-08-29 RX ORDER — ESTRADIOL 1 MG/1
1 TABLET ORAL DAILY
Qty: 90 TABLET | Refills: 0 | Status: SHIPPED | OUTPATIENT
Start: 2023-08-29

## 2023-08-29 NOTE — TELEPHONE ENCOUNTER
Patient needing estradiol refilled. Last annual 7/26/23. Medication pended and sent to on call provider to sign off.

## 2023-09-05 ENCOUNTER — HOSPITAL ENCOUNTER (OUTPATIENT)
Dept: PHYSICAL THERAPY | Facility: HOSPITAL | Age: 48
Setting detail: THERAPIES SERIES
Discharge: HOME | End: 2023-09-05
Payer: COMMERCIAL

## 2023-09-05 DIAGNOSIS — M54.9 CHRONIC BACK PAIN, UNSPECIFIED BACK LOCATION, UNSPECIFIED BACK PAIN LATERALITY: Primary | ICD-10-CM

## 2023-09-05 DIAGNOSIS — G89.29 CHRONIC BACK PAIN, UNSPECIFIED BACK LOCATION, UNSPECIFIED BACK PAIN LATERALITY: Primary | ICD-10-CM

## 2023-09-05 PROCEDURE — 97161 PT EVAL LOW COMPLEX 20 MIN: CPT | Performed by: PHYSICAL THERAPIST

## 2023-09-05 PROCEDURE — 97110 THERAPEUTIC EXERCISES: CPT | Performed by: PHYSICAL THERAPIST

## 2023-09-06 NOTE — THERAPY EVALUATION
Outpatient Physical Therapy Ortho Initial Evaluation  [facility]     Patient Name: Екатерина Stovall  : 1975  MRN: [id number]  Today's Date: 2023      Visit Date: 2023    Attendance: 1 visits  Subjective improvement: N/A  MD appt: PRN  Recheck due: 2023      There is no problem list on file for this patient.       Past Medical History:   Diagnosis Date    Allergic     Cats,horses,dogs, trees and seasonal allergies    Anxiety     Arrhythmia     COVID-19 2022    Hyperlipidemia     Migraine     Seasonal allergies         Past Surgical History:   Procedure Laterality Date    APPENDECTOMY      ARM LESION/CYST EXCISION Left 2022    Procedure: LEFT RING FINGER NAILBED LESION EXCISIONAL BIOPSY;  Surgeon: Harry Holt MD;  Location: [facility] OR;  Service: Plastics;  Laterality: Left;    FINGER MASS EXCISION      LAPAROSCOPIC SALPINGOOPHERECTOMY Bilateral     OOPHORECTOMY      TOTAL ABDOMINAL HYSTERECTOMY      RIVKA w/ BSO due to pain caused by varicose veins and heavy bleeding.    TUBAL ABDOMINAL LIGATION         Visit Dx:     ICD-10-CM ICD-9-CM   1. Chronic back pain, unspecified back location, unspecified back pain laterality  M54.9 724.5    G89.29 338.29          Patient History       Row Name 23 0800             History    Chief Complaint Difficulty with daily activities;Joint stiffness;Pain;Tightness  -KG      Type of Pain Back pain  -KG      Date Current Problem(s) Began --  ~1 month ago; gradually getting worse  -KG      Brief Description of Current Complaint Presents with c/o of lumbar to low thoracic pain. Started about 1 month ago and is worsening. States she can't sleep in her bed without bad. States she eventually has to get up. Better if she sleeps in recliner but doesn't like it. States she is now strictly sleeping in recliner. States as she gets up and gets going, her back feels better. States she can do strength training and even does barre class and  "yoga and has minimal pain. States she's not limited really in daily activities by pain but sleeping is not good and is one of her main compliant. Pt has a standing desk at work. Has to stand more than she sits due to pain; standing feels better. States sometimes the pain in thoracic spine radiates around her sides. Reports she has a nodule in her lung, found in 2014. Through her pain may be related to that. Had scans and nothing with it has changed. Does report hx of horse accident in 2007.  -KG      Patient/Caregiver Goals Return to prior level of function;Relieve pain;Know what to do to help the symptoms;Improve mobility  -KG      Patient/Caregiver Goals Comment Strengthening, help with pain  -KG      Hand Dominance right-handed  -KG      Occupation/sports/leisure activities Pt works at [employer]. Enjoys Walk, kayak, jeep, hunt  -KG      What clinical tests have you had for this problem? X-ray  -KG      Results of Clinical Tests Per imaging report: \"Chronic bilateral L5 pars defects with grade 1 anterolisthesis of L5  on S1.\"  -KG         Pain     Pain Location Back  -KG      Pain at Present 7  -KG      Pain at Best 1  -KG      Pain at Worst 10  -KG      Pain Frequency Constant/continuous  -KG      Pain Description Aching;Sharp  -KG      What Performance Factors Make the Current Problem(s) WORSE? Sitting, laying down, sleeping. Prolonged/repetitve activities.  -KG      What Performance Factors Make the Current Problem(s) BETTER? Standing, walking, staying active, position changes  -KG      Pain Comments Advil can dull the pain  -KG      Is your sleep disturbed? Yes  -KG      What position do you sleep in? --  Can sleepin bed for ~2 hours and then has to get in recliner. Recliner more comfortable right now. When sleeping in bed, pt sleeps on R side and supine.  -KG         Fall Risk Assessment    Any falls in the past year: No  -KG         Services    Are you currently receiving Home Health " "services No  -KG      Do you plan to receive Home Health services in the near future No  -KG                User Key  (r) = Recorded By, (t) = Taken By, (c) = Cosigned By      Initials Name Provider Type    MARIA GUADALUPE Cintia Rodriguez PT Physical Therapist                     PT Ortho       Row Name 09/05/23 0800       Subjective Comments    Subjective Comments Refer to therapy pt history for details  -KG       Precautions and Contraindications    Precautions/Limitations no known precautions/limitations  -KG       Subjective Pain    Able to rate subjective pain? yes  -KG    Pre-Treatment Pain Level 7  -KG    Post-Treatment Pain Level --  \"feels better\"  -KG       Posture/Observations    Posture/Observations Comments No acute distress. Fair overall postural awareness. Upright when seated in chair. Does stand throughout most of subjective portion of eval. Supine R IC and ASIS superior vs L. Equal at pelvis after MET  -KG       Quarter Clearing    Quarter Clearing Lower Quarter Clearing  -KG       Neural Tension Signs- Lower Quarter Clearing    Slump Bilateral:;Negative  -KG    SLR Bilateral:;Negative  -KG       Sensory Screen for Light Touch- Lower Quarter Clearing    L1 (inguinal area) Bilateral:;Intact  -KG    L2 (anterior mid thigh) Bilateral:;Intact  -KG    L3 (distal anterior thigh) Bilateral:;Intact  -KG    L4 (medial lower leg/foot) Bilateral:;Intact  -KG    L5 (lateral lower leg/great toe) Bilateral:;Intact  -KG    S1 (bottom of foot) Bilateral:;Intact  -KG       SI/Hip Screen- Lower Quarter Clearing    ASIS compression Bilateral:;Negative  -KG    ASIS distraction Bilateral:;Negative  -KG    Mikel's/Omar's test Bilateral:;Negative  -KG    Posterior thigh sheer Bilateral:;Negative  -KG       Special Tests/Palpation    Special Tests/Palpation Lumbar/SI;Cervical/Thoracic  -KG       Thoracic Accessory Motions    Thoracic Accessory Motions Tested? Yes  -KG    Pa glide- Middle thoracic Center:;Hypomobile  -KG    Pa " glide- Lower thoracic Center:;Hypomobile  -KG       Lumbosacral Palpation    Lumbosacral Palpation? Yes  -KG    Lumbosacral Palpation Comments Denies any TTP at lumbar parapsinals. Mild tightness in post hips. Mild TTP at T5-T8  -KG       General ROM    Head/Neck/Trunk Trunk Extension;Trunk Flexion;Trunk Lt Lateral Flexion;Trunk Rt Lateral Flexion;Trunk Lt Rotation;Trunk Rt Rotation  -KG    GENERAL ROM COMMENTS Bilateral hip, knee, ankle AROM WFL  -KG       Head/Neck/Trunk    Trunk Extension AROM 75% full range with low back pain  -KG    Trunk Flexion AROM Fingertips to mid shin, low back pain  -KG    Trunk Lt Lateral Flexion AROM WFL no no pain  -KG    Trunk Rt Lateral Flexion AROM WFL mild discomfort ipsilateral  -KG    Trunk Lt Rotation AROM WFL no pain  -KG    Trunk Rt Rotation AROM WFL no pain  -KG       MMT (Manual Muscle Testing)    Rt Lower Ext Rt Hip Flexion;Rt Hip Extension;Rt Hip ABduction;Rt Hip ADduction;Rt Knee Extension;Rt Knee Flexion;Rt Ankle Plantarflexion;Rt Ankle Dorsiflexion  -KG    Lt Lower Ext Lt Hip Flexion;Lt Hip Extension;Lt Hip ABduction;Lt Hip ADduction;Lt Knee Extension;Lt Knee Flexion;Lt Ankle Plantarflexion;Lt Ankle Dorsiflexion  -KG       MMT Right Lower Ext    Rt Hip Flexion MMT, Gross Movement (5/5) normal  -KG    Rt Hip Extension MMT, Gross Movement (4+/5) good plus  -KG    Rt Hip ABduction MMT, Gross Movement (4+/5) good plus  -KG    Rt Hip ADduction MMT, Gross Movement (5/5) normal  -KG    Rt Knee Extension MMT, Gross Movement (5/5) normal  -KG    Rt Knee Flexion MMT, Gross Movement (5/5) normal  -KG    Rt Ankle Plantarflexion MMT, Gross Movement (5/5) normal  -KG    Rt Ankle Dorsiflexion MMT, Gross Movement (5/5) normal  -KG       MMT Left Lower Ext    Lt Hip Flexion MMT, Gross Movement (5/5) normal  -KG    Lt Hip Extension MMT, Gross Movement (4+/5) good plus  -KG    Lt Hip ABduction MMT, Gross Movement (4+/5) good plus  -KG    Lt Hip ADduction MMT, Gross Movement (5/5) normal   -KG    Lt Knee Extension MMT, Gross Movement (5/5) normal  -KG    Lt Knee Flexion MMT, Gross Movement (5/5) normal  -KG    Lt Ankle Plantarflexion MMT, Gross Movement (5/5) normal  -KG    Lt Ankle Dorsiflexion MMT, Gross Movement (5/5) normal  -KG       Sensation    Sensation WNL? WFL  -KG    Light Touch No apparent deficits  -KG       Flexibility    Flexibility Tested? Lower Extremity  -KG       Lower Extremity Flexibility    Hamstrings Bilateral:;Mildly limited  -KG    Hip Flexors Bilateral:;Mildly limited  -KG    Hip External Rotators Bilateral:;Mildly limited  -KG    Hip Internal Rotators Bilateral:;Mildly limited  -KG       Transfers    Comment, (Transfers) Cues/education for log roll with sup<>sit transfer  -KG       Gait/Stairs (Locomotion)    Comment, (Gait/Stairs) Non-antalgic gait with no AD.  -KG              User Key  (r) = Recorded By, (t) = Taken By, (c) = Cosigned By      Initials Name Provider Type    KG Cintia Rodriguez, PT Physical Therapist                                Therapy Education  Education Details: POC education. Anatomy education. HEP: see exercise flowsheet for details  Given: HEP, Symptoms/condition management, Pain management, Posture/body mechanics  Program: New  How Provided: Verbal, Demonstration, Written  Provided to: Patient  Level of Understanding: Teach back education performed, Verbalized, Demonstrated      PT OP Goals       Row Name 09/05/23 0800          PT Short Term Goals    STG Date to Achieve 09/26/23  -KG     STG 1 Demonstrate independence/compliance in performance of initial HEP  -KG     STG 2 Demonstrate independence in performance of isometric TA/kegel contraction with therex in various positions for functional carryover into daily activity performance.  -KG     STG 3 Tolerate 15 minutes of seated stabilization activities demonstrating proper seated/postural positioning with increased pain  -KG     STG 4 Subjectively report ability to sleep through half the night  in her bed without increased low back pain  -KG        Long Term Goals    LTG Date to Achieve 10/17/23  -KG     LTG 1 ubjectively report 60% overall improvement in pain/symptoms and functional activity tolerance  -KG     LTG 2 Demonstrate improved bilateral hip abd/ext MMT to 5/5  -KG     LTG 3 Subjectively report ability to sleep through the night in her bed without increased pain  -KG     LTG 4 Demonstrate proper lifting/body mechanics when lifting weighted crate from various surface heights without increased pain  -KG     LTG 5 Subjectively report ability to sleep through the night in her bed without increased pain  -KG     LTG 6 Demonstrate independence in final HEP for continued management & improvement in signs/symptoms  -KG        Time Calculation    PT Goal Re-Cert Due Date 09/26/23  -KG               User Key  (r) = Recorded By, (t) = Taken By, (c) = Cosigned By      Initials Name Provider Type    KG Cintia Rodriguez, PT Physical Therapist                     PT Assessment/Plan       Row Name 09/05/23 0800          PT Assessment    Functional Limitations Limitation in home management;Limitations in community activities;Performance in leisure activities;Limitations in functional capacity and performance;Performance in work activities  -KG     Impairments Impaired flexibility;Impaired muscle endurance;Joint mobility;Muscle strength;Pain;Posture;Poor body mechanics  -KG     Assessment Comments Pt is a 47 y.o. female presenting with acute low back & thoracic pain that is limiting performance of functional mobility & daily activities. Denies in BLE pain/symptoms. Did present with lumbopelvic malalignment that corrected well with MET/manual interventions. Pt with better tolerance to standing and prone positioning and has improved pain symptoms in these positions. Sitting increases her pain/symptoms. Pt also notes pain is worse with sleeping/laying down. Mild tightness noted in post hips. Does have some stiffness  "in low thoracic spine but responded well to mobilizations to this area; associated muscle tightness present as well. Radiographic imaging positive for grade 1 anterolisthesis of L5 on S1. Pt will benefit from skilled PT services to address these deficits for improvements in functional strength, dynamic core stability, posture/ awareness, & functional activity tolerance.  -KG     Rehab Potential Good  -KG     Patient/caregiver participated in establishment of treatment plan and goals Yes  -KG     Patient would benefit from skilled therapy intervention Yes  -KG        PT Plan    PT Frequency 2x/week  -KG     Predicted Duration of Therapy Intervention (PT) 10 visits  -KG     Planned CPT's? PT EVAL LOW COMPLEXITY: 64174;PT THER PROC EA 15 MIN: 95043;PT THER ACT EA 15 MIN: 17274;PT MANUAL THERAPY EA 15 MIN: 03913;PT NEUROMUSC RE-EDUCATION EA 15 MIN: 47346  -KG     Physical Therapy Interventions (Optional Details) home exercise program;joint mobilization;lumbar stabilization;manual therapy techniques;modalities;neuromuscular re-education;patient/family education;postural re-education;ROM (Range of Motion);strengthening;stretching  -KG     PT Plan Comments Work on overall functional hip strength & dynamic core stability. Continue prone stabilization activities; avoid excessive lumbar ext. Posture/ education. Manual prn. LE stretching. Try hip flexor stretch next.  -KG               User Key  (r) = Recorded By, (t) = Taken By, (c) = Cosigned By      Initials Name Provider Type    KG Cintia Rodriguez, PT Physical Therapist                       OP Exercises       Row Name 09/05/23 0800             Subjective Comments    Subjective Comments Refer to therapy pt history for details  -KG         Subjective Pain    Able to rate subjective pain? yes  -KG      Pre-Treatment Pain Level 7  -KG      Post-Treatment Pain Level --  \"feels better\"  -KG         Exercise 1    Exercise Name 1 SKTC stretch deb  -KG  " "    Cueing 1 Verbal  -KG      Reps 1 2  -KG      Time 1 30\" hold  -KG         Exercise 2    Exercise Name 2 HL piriformis stretch deb  -KG      Cueing 2 Verbal  -KG      Reps 2 2  -KG      Time 2 30\" hold  -KG         Exercise 3    Exercise Name 3 Bridges with iso TA  -KG      Cueing 3 Verbal  -KG      Sets 3 1  -KG      Reps 3 10  -KG      Time 3 5\" hold  -KG         Exercise 4    Exercise Name 4 Prone heel squeeze/IR  -KG      Cueing 4 Verbal  -KG      Sets 4 1  -KG      Reps 4 10  -KG      Time 4 5\" hold on squeeze  -KG      Additional Comments yellow tband  -KG         Exercise 5    Exercise Name 5 Seated thoracic ext stretch with bolster  -KG      Reps 5 review/demo for HEP  -KG                User Key  (r) = Recorded By, (t) = Taken By, (c) = Cosigned By      Initials Name Provider Type    Cintia Rice, PT Physical Therapist                  Manual Rx (last 36 hours)       Manual Treatments       Row Name 09/05/23 0800             Manual Rx 1    Manual Rx 1 Location L hip flexor, R hamstring  -KG      Manual Rx 1 Type MET performed simultaneously  -KG      Manual Rx 1 Duration 4x5\" hold  -KG                User Key  (r) = Recorded By, (t) = Taken By, (c) = Cosigned By      Initials Name Provider Type    Cintia Rice, PT Physical Therapist                                Outcome Measure Options: Modified Oswestry  Modified Oswestry  Modified Oswestry Score/Comments: 13 = 26%      Time Calculation:     Start Time: 0855  Stop Time: 0934  Time Calculation (min): 39 min  Total Timed Code Minutes- PT: 15 minute(s)     Therapy Charges for Today       Code Description Service Date Service Provider Modifiers Qty    60354669688 HC PT EVAL LOW COMPLEXITY 2 9/5/2023 Cintia Rodriguez, PT GP 1    [id number] HC PT THER PROC EA 15 MIN 9/5/2023 Cintia Rodriguez, PT GP 1            PT G-Codes  Outcome Measure Options: Modified Oswestry  Modified Oswestry Score/Comments: 13 = 26%         Cintia Rodriguez, " PT  9/5/2023

## 2023-09-07 ENCOUNTER — OFFICE VISIT (OUTPATIENT)
Dept: OBSTETRICS AND GYNECOLOGY | Facility: CLINIC | Age: 48
End: 2023-09-07
Payer: COMMERCIAL

## 2023-09-07 VITALS
DIASTOLIC BLOOD PRESSURE: 74 MMHG | WEIGHT: 196 LBS | HEIGHT: 66 IN | SYSTOLIC BLOOD PRESSURE: 118 MMHG | BODY MASS INDEX: 31.5 KG/M2

## 2023-09-07 DIAGNOSIS — E66.9 CLASS 1 OBESITY WITHOUT SERIOUS COMORBIDITY WITH BODY MASS INDEX (BMI) OF 31.0 TO 31.9 IN ADULT, UNSPECIFIED OBESITY TYPE: Primary | ICD-10-CM

## 2023-09-07 RX ORDER — PHENTERMINE HYDROCHLORIDE 37.5 MG/1
37.5 CAPSULE ORAL EVERY MORNING
Qty: 30 CAPSULE | Refills: 0 | Status: SHIPPED | OUTPATIENT
Start: 2023-09-07

## 2023-09-07 NOTE — PROGRESS NOTES
Subjective   Екатерина Stovall is a 47 y.o. female  YOB: 1975      Chief Complaint   Patient presents with    Weight Gain     Pt here to discuss medication for weight loss.        Patient here to discuss restarting weight loss meds.      The following portions of the patient's history were reviewed and updated as appropriate: allergies, current medications, past family history, past medical history, past social history, past surgical history, and problem list.    No Known Allergies    Past Medical History:   Diagnosis Date    Allergic 2011    Cats,horses,dogs, trees and seasonal allergies    Anxiety     Arrhythmia     COVID-19 07/2022    Hyperlipidemia     Migraine     Seasonal allergies        Family History   Problem Relation Age of Onset    Ovarian cancer Mother     Arthritis Mother     Asthma Mother     Cancer Mother     Thyroid disease Mother     Breast cancer Paternal Grandmother     Breast cancer Maternal Grandmother     Cancer Maternal Grandmother     Uterine cancer Maternal Aunt     Breast cancer Maternal Aunt     Cancer Maternal Aunt     Heart disease Father     Hyperlipidemia Father     Diabetes Father     Hypertension Father     Cancer Maternal Aunt     Colon cancer Neg Hx     Melanoma Neg Hx        Social History     Socioeconomic History    Marital status:    Tobacco Use    Smoking status: Never    Smokeless tobacco: Never   Vaping Use    Vaping Use: Never used   Substance and Sexual Activity    Alcohol use: Not Currently     Comment: I very seldom have a glass of wine    Drug use: Never    Sexual activity: Yes     Partners: Male     Birth control/protection: Hysterectomy         Current Outpatient Medications:     Ascorbic Acid (VITAMIN C PO), Take 1 tablet by mouth Daily., Disp: , Rfl:     atorvastatin (LIPITOR) 40 MG tablet, Take 1 tablet by mouth Every Night., Disp: 90 tablet, Rfl: 0    escitalopram (LEXAPRO) 10 MG tablet, Take 1 tablet by mouth Daily., Disp: 90 tablet, Rfl:  3    estradiol (ESTRACE) 1 MG tablet, TAKE 1 TABLET BY MOUTH DAILY, Disp: 90 tablet, Rfl: 0    levocetirizine (XYZAL) 5 MG tablet, Take 1 tablet by mouth Every Evening., Disp: , Rfl:     montelukast (SINGULAIR) 10 MG tablet, Take 1 tablet by mouth every night at bedtime., Disp: 90 tablet, Rfl: 3    Multiple Vitamins-Minerals (ZINC PO), Take 1 tablet by mouth Daily., Disp: , Rfl:     Probiotic Product (Align) 4 MG capsule, Take 2 tablets by mouth Daily., Disp: , Rfl:     phentermine 37.5 MG capsule, Take 1 capsule by mouth Every Morning., Disp: 30 capsule, Rfl: 0    No LMP recorded. Patient has had a hysterectomy.    Sexual History:         Could not be calculated    Past Surgical History:   Procedure Laterality Date    APPENDECTOMY      ARM LESION/CYST EXCISION Left 01/21/2022    Procedure: LEFT RING FINGER NAILBED LESION EXCISIONAL BIOPSY;  Surgeon: Harry Holt MD;  Location: [facility];  Service: Plastics;  Laterality: Left;    FINGER MASS EXCISION      LAPAROSCOPIC SALPINGOOPHERECTOMY Bilateral     OOPHORECTOMY      TOTAL ABDOMINAL HYSTERECTOMY      RIVKA w/ BSO due to pain caused by varicose veins and heavy bleeding.    TUBAL ABDOMINAL LIGATION         Review of Systems   Constitutional:  Negative for activity change, appetite change, chills, diaphoresis, fatigue, fever and unexpected weight change.   HENT:  Negative for congestion, dental problem, drooling, ear discharge, ear pain, facial swelling, hearing loss, mouth sores, nosebleeds, postnasal drip, rhinorrhea, sinus pressure, sinus pain, sneezing, sore throat, tinnitus, trouble swallowing and voice change.    Eyes:  Negative for photophobia, pain, discharge, redness, itching and visual disturbance.   Respiratory:  Negative for apnea, cough, choking, chest tightness, shortness of breath, wheezing and stridor.    Cardiovascular:  Negative for chest pain, palpitations and leg swelling.   Gastrointestinal:  Negative for abdominal distention, abdominal pain,  anal bleeding, blood in stool, constipation, diarrhea, nausea, rectal pain and vomiting.   Endocrine: Negative for cold intolerance, heat intolerance, polydipsia, polyphagia and polyuria.   Genitourinary:  Negative for decreased urine volume, difficulty urinating, dyspareunia, dysuria, enuresis, flank pain, frequency, genital sores, hematuria, menstrual problem, pelvic pain, urgency, vaginal bleeding, vaginal discharge and vaginal pain.   Musculoskeletal:  Negative for arthralgias, back pain, gait problem, joint swelling, myalgias, neck pain and neck stiffness.   Skin:  Negative for color change, pallor, rash and wound.   Allergic/Immunologic: Negative for environmental allergies, food allergies and immunocompromised state.   Neurological:  Negative for dizziness, tremors, seizures, syncope, facial asymmetry, speech difficulty, weakness, light-headedness, numbness and headaches.   Hematological:  Negative for adenopathy. Does not bruise/bleed easily.   Psychiatric/Behavioral:  Negative for agitation, behavioral problems, confusion, decreased concentration, dysphoric mood, hallucinations, self-injury, sleep disturbance and suicidal ideas. The patient is not nervous/anxious and is not hyperactive.      Objective   Physical Exam  Vitals reviewed.   Constitutional:       General: She is not in acute distress.     Appearance: She is well-developed. She is not ill-appearing.   HENT:      Head: Normocephalic.      Right Ear: External ear normal.      Left Ear: External ear normal.      Nose: Nose normal.      Mouth/Throat:      Pharynx: No oropharyngeal exudate.   Eyes:      General: No scleral icterus.        Right eye: No discharge.         Left eye: No discharge.      Conjunctiva/sclera: Conjunctivae normal.      Pupils: Pupils are equal, round, and reactive to light.   Neck:      Thyroid: No thyroid mass or thyromegaly.   Cardiovascular:      Rate and Rhythm: Normal rate and regular rhythm.      Heart sounds: Normal  "heart sounds. No murmur heard.  Pulmonary:      Effort: Pulmonary effort is normal. No respiratory distress.      Breath sounds: Normal breath sounds. No wheezing or rales.   Chest:      Chest wall: No tenderness.   Abdominal:      General: Bowel sounds are normal. There is no distension.      Palpations: Abdomen is soft. There is no mass.      Tenderness: There is no abdominal tenderness. There is no guarding or rebound.      Hernia: No hernia is present.   Genitourinary:     Exam position: Prone.      Labia:         Right: No rash, tenderness or lesion.         Left: No rash, tenderness, lesion or injury.       Vagina: Normal. No vaginal discharge or tenderness.      Cervix: No cervical motion tenderness, discharge or friability.      Uterus: Not enlarged and not tender.       Adnexa:         Right: No mass or tenderness.          Left: No mass or tenderness.        Comments: Urethra and urethral meatus normal.    Bladder - normal, no prolapse.  Perineum and rectum examined - intact and no lesions.    Musculoskeletal:         General: No tenderness or deformity. Normal range of motion.      Cervical back: Normal range of motion and neck supple.   Lymphadenopathy:      Cervical: No cervical adenopathy.   Skin:     General: Skin is warm and dry.      Coloration: Skin is not pale.      Findings: No erythema or rash.   Neurological:      Mental Status: She is alert and oriented to person, place, and time.      Motor: No abnormal muscle tone.      Coordination: Coordination normal.      Deep Tendon Reflexes: Reflexes are normal and symmetric.   Psychiatric:         Behavior: Behavior normal. Behavior is cooperative.         Thought Content: Thought content normal.         Judgment: Judgment normal.         Vitals:    09/07/23 1049   BP: 118/74   Weight: 88.9 kg (196 lb)   Height: 167.6 cm (66\")       Diagnoses and all orders for this visit:    1. Class 1 obesity without serious comorbidity with body mass index (BMI) of " 31.0 to 31.9 in adult, unspecified obesity type (Primary)  Comments:  Patient wants to discuss weight loss options.  Discussed options and risks/benefits.  Ej reviewed.  RX for Phentermine sent to pharmacy and patient instructed in use.  RTO in 4 weeks for f/u or sooner prn.  Orders:  -     phentermine 37.5 MG capsule; Take 1 capsule by mouth Every Morning.  Dispense: 30 capsule; Refill: 0                        Non-Smoker    MyChart Instructions Given

## 2023-09-11 ENCOUNTER — HOSPITAL ENCOUNTER (OUTPATIENT)
Dept: PHYSICAL THERAPY | Facility: HOSPITAL | Age: 48
Setting detail: THERAPIES SERIES
Discharge: HOME | End: 2023-09-11
Payer: COMMERCIAL

## 2023-09-11 DIAGNOSIS — G89.29 CHRONIC BACK PAIN, UNSPECIFIED BACK LOCATION, UNSPECIFIED BACK PAIN LATERALITY: Primary | ICD-10-CM

## 2023-09-11 DIAGNOSIS — M54.9 CHRONIC BACK PAIN, UNSPECIFIED BACK LOCATION, UNSPECIFIED BACK PAIN LATERALITY: Primary | ICD-10-CM

## 2023-09-11 PROCEDURE — 97110 THERAPEUTIC EXERCISES: CPT

## 2023-09-11 NOTE — THERAPY TREATMENT NOTE
Outpatient Physical Therapy Ortho Treatment Note  [facility]     Patient Name: Екатерина Stovall  : 1975  MRN: [id number]  Today's Date: 2023      Visit Date: 2023    Attendance: 2 visits  Subjective improvement: n/a  MD appt: PRN  Recheck due: 23    Visit Dx:    ICD-10-CM ICD-9-CM   1. Chronic back pain, unspecified back location, unspecified back pain laterality  M54.9 724.5    G89.29 338.29       There is no problem list on file for this patient.       Past Medical History:   Diagnosis Date    Allergic 2011    Cats,horses,dogs, trees and seasonal allergies    Anxiety     Arrhythmia     COVID-19 2022    Hyperlipidemia     Migraine     Seasonal allergies         Past Surgical History:   Procedure Laterality Date    APPENDECTOMY      ARM LESION/CYST EXCISION Left 2022    Procedure: LEFT RING FINGER NAILBED LESION EXCISIONAL BIOPSY;  Surgeon: Harry Holt MD;  Location: [facility] OR;  Service: Plastics;  Laterality: Left;    FINGER MASS EXCISION      LAPAROSCOPIC SALPINGOOPHERECTOMY Bilateral     OOPHORECTOMY      TOTAL ABDOMINAL HYSTERECTOMY      RIVKA w/ BSO due to pain caused by varicose veins and heavy bleeding.    TUBAL ABDOMINAL LIGATION          PT Ortho       Row Name 23 1500       Precautions and Contraindications    Precautions/Limitations no known precautions/limitations  -KM       Subjective Pain    Able to rate subjective pain? yes  -KM       Posture/Observations    Posture/Observations Comments R IC superior. L ASIS superior. Corrected with both LAD and MET  -KM              User Key  (r) = Recorded By, (t) = Taken By, (c) = Cosigned By      Initials Name Provider Type     Cady Brock PTA Physical Therapist Assistant                                 PT Assessment/Plan       Row Name 23 1500          PT Assessment    Assessment Comments pt did well with treatment today. Had minimal complaitns of pain. Good recall of current HEP.  Showed malalignment at pelvis again this date that corrected with both LAD and MET. Added supine hip flexor S to HEP  -KM        PT Plan    PT Frequency 2x/week  -KM     PT Plan Comments Seated core education next visit  -KM               User Key  (r) = Recorded By, (t) = Taken By, (c) = Cosigned By      Initials Name Provider Type    Cady Vargas, PTA Physical Therapist Assistant                       OP Exercises       Row Name 09/11/23 1500             Subjective Comments    Subjective Comments pt states that she didnt wear her shoes today  -KM         Subjective Pain    Able to rate subjective pain? yes  -KM      Pre-Treatment Pain Level 5  -KM      Post-Treatment Pain Level 4  -KM         Exercise 1    Exercise Name 1 Seated HS S  -KM      Reps 1 2  -KM      Time 1 30 sec hold  -KM         Exercise 2    Exercise Name 2 seated figure 4 S  -KM      Reps 2 2  -KM      Time 2 30 sec hold  -KM         Exercise 3    Exercise Name 3 supine SKTC S  -KM      Reps 3 2  -KM      Time 3 30 sec hold  -KM         Exercise 4    Exercise Name 4 HL piriformis S  -KM      Reps 4 2  -KM      Time 4 30 sec hold  -KM         Exercise 5    Exercise Name 5 Supine ashlee hip flexor S  -KM      Reps 5 2  -KM      Time 5 30 sec  -KM      Additional Comments bilateral  -KM         Exercise 6    Exercise Name 6 HL hip abd w/ tband + ta  -KM      Sets 6 2  -KM      Reps 6 10  -KM      Additional Comments red  -KM         Exercise 7    Exercise Name 7 HL hip add squeeze + TA  -KM      Sets 7 2  -KM      Reps 7 10  -KM      Time 7 5 sec hold  -KM         Exercise 8    Exercise Name 8 Bridges + TA  -KM      Sets 8 2  -KM      Reps 8 10  -KM      Time 8 5 sec hold  -KM         Exercise 9    Exercise Name 9 Alignment  -KM         Exercise 10    Exercise Name 10 Prone hip IR/ER  -KM      Sets 10 1  -KM      Reps 10 15  -KM      Time 10 5 sec hold on squeeze; pause on pull apart  -KM      Additional Comments yellow tband  -KM       "   Exercise 11    Exercise Name 11 Prone TKE + gs  -KM      Sets 11 1  -KM      Reps 11 15  -KM      Time 11 5 sec hold  -KM                User Key  (r) = Recorded By, (t) = Taken By, (c) = Cosigned By      Initials Name Provider Type    Cady Vargas PTA Physical Therapist Assistant                             Manual Rx (last 36 hours)       Manual Treatments       Row Name 09/11/23 1700             Manual Rx 1    Manual Rx 1 Location L hip flexor, R hamstring  -KM      Manual Rx 1 Type MET performed simultaneously  -KM      Manual Rx 1 Duration 4x5\" hold  -KM         Manual Rx 2    Manual Rx 2 Location RLE  -KM      Manual Rx 2 Type LAD  -KM      Manual Rx 2 Duration 3x30 sec  -KM                User Key  (r) = Recorded By, (t) = Taken By, (c) = Cosigned By      Initials Name Provider Type    Cady Vargas PTA Physical Therapist Assistant                     PT OP Goals       Row Name 09/11/23 1500          PT Short Term Goals    STG Date to Achieve 09/26/23  -KM     STG 1 Demonstrate independence/compliance in performance of initial HEP  -KM     STG 1 Progress Ongoing  -KM     STG 2 Demonstrate independence in performance of isometric TA/kegel contraction with therex in various positions for functional carryover into daily activity performance.  -KM     STG 2 Progress Ongoing  -KM     STG 3 Tolerate 15 minutes of seated stabilization activities demonstrating proper seated/postural positioning with increased pain  -KM     STG 3 Progress Ongoing  -KM     STG 4 Subjectively report ability to sleep through half the night in her bed without increased low back pain  -KM     STG 4 Progress Ongoing  -KM        Long Term Goals    LTG Date to Achieve 10/17/23  -KM     LTG 1 ubjectively report 60% overall improvement in pain/symptoms and functional activity tolerance  -KM     LTG 2 Demonstrate improved bilateral hip abd/ext MMT to 5/5  -KM     LTG 3 Subjectively report ability to sleep through the " night in her bed without increased pain  -KM     LTG 4 Demonstrate proper lifting/body mechanics when lifting weighted crate from various surface heights without increased pain  -KM     LTG 5 Subjectively report ability to sleep through the night in her bed without increased pain  -KM     LTG 6 Demonstrate independence in final HEP for continued management & improvement in signs/symptoms  -KM        Time Calculation    PT Goal Re-Cert Due Date 09/26/23  -KM               User Key  (r) = Recorded By, (t) = Taken By, (c) = Cosigned By      Initials Name Provider Type    Cady Vargas PTA Physical Therapist Assistant                    Therapy Education  Education Details: supine hip flexor S  Given: HEP, Symptoms/condition management, Pain management, Posture/body mechanics  Program: New  How Provided: Verbal, Demonstration, Written  Provided to: Patient  Level of Understanding: Teach back education performed, Verbalized, Demonstrated              Time Calculation:   Start Time: 1555  Stop Time: 1645  Time Calculation (min): 50 min  Therapy Charges for Today       Code Description Service Date Service Provider Modifiers Qty    46056910363 HC PT THER PROC EA 15 MIN 9/11/2023 Cady Brock PTA GP, CQ 3                      Cady Brock PTA  9/11/2023

## 2023-09-13 ENCOUNTER — HOSPITAL ENCOUNTER (OUTPATIENT)
Dept: PHYSICAL THERAPY | Facility: HOSPITAL | Age: 48
Setting detail: THERAPIES SERIES
Discharge: HOME | End: 2023-09-13
Payer: COMMERCIAL

## 2023-09-13 DIAGNOSIS — M54.9 CHRONIC BACK PAIN, UNSPECIFIED BACK LOCATION, UNSPECIFIED BACK PAIN LATERALITY: Primary | ICD-10-CM

## 2023-09-13 DIAGNOSIS — G89.29 CHRONIC BACK PAIN, UNSPECIFIED BACK LOCATION, UNSPECIFIED BACK PAIN LATERALITY: Primary | ICD-10-CM

## 2023-09-13 PROCEDURE — 97110 THERAPEUTIC EXERCISES: CPT | Performed by: PHYSICAL THERAPIST

## 2023-09-13 NOTE — THERAPY TREATMENT NOTE
"  Outpatient Physical Therapy Ortho Treatment Note  [facility]     Patient Name: Екатерина Stovall  : 1975  MRN: [id number]  Today's Date: 2023      Visit Date: 2023    Attendance: 3 visits  Subjective improvement: \"a little better\"  MD appt: PRN  Recheck due: 23    Visit Dx:    ICD-10-CM ICD-9-CM   1. Chronic back pain, unspecified back location, unspecified back pain laterality  M54.9 724.5    G89.29 338.29       There is no problem list on file for this patient.       Past Medical History:   Diagnosis Date    Allergic 2011    Cats,horses,dogs, trees and seasonal allergies    Anxiety     Arrhythmia     COVID-19 2022    Hyperlipidemia     Migraine     Seasonal allergies         Past Surgical History:   Procedure Laterality Date    APPENDECTOMY      ARM LESION/CYST EXCISION Left 2022    Procedure: LEFT RING FINGER NAILBED LESION EXCISIONAL BIOPSY;  Surgeon: Harry Holt MD;  Location: [facility] OR;  Service: Plastics;  Laterality: Left;    FINGER MASS EXCISION      LAPAROSCOPIC SALPINGOOPHERECTOMY Bilateral     OOPHORECTOMY      TOTAL ABDOMINAL HYSTERECTOMY      RIVKA w/ BSO due to pain caused by varicose veins and heavy bleeding.    TUBAL ABDOMINAL LIGATION          PT Ortho       Row Name 23 1600       Precautions and Contraindications    Precautions/Limitations no known precautions/limitations  -KG       Posture/Observations    Posture/Observations Comments No malalignment noted at pelvis this date other than very slight prominent L sacral base which improved after manual.  -KG              User Key  (r) = Recorded By, (t) = Taken By, (c) = Cosigned By      Initials Name Provider Type    Cintia Rice, PT Physical Therapist                                 PT Assessment/Plan       Row Name 23 1600          PT Assessment    Assessment Comments Good overall PN/postural positioning with seated core stabilization activities. Tends to ant tilt slightly " "but corrects with cues. Pt had no malalignment at pelvis this date. Doing well with prone stability.  -KG        PT Plan    PT Frequency 2x/week  -KG     PT Plan Comments Continue standing and seated core progressions. Sit<>stands.  -KG               User Key  (r) = Recorded By, (t) = Taken By, (c) = Cosigned By      Initials Name Provider Type    KG Michael Cintia NICOLASA, PT Physical Therapist                       OP Exercises       Row Name 09/13/23 1600             Subjective    Subjective Comments pt states she's been trying to sleep in the bed more. States thinks she may need a new matress. Does feel like therapy has been helping some already.  -KG         Subjective Pain    Able to rate subjective pain? yes  -KG      Pre-Treatment Pain Level 3  -KG      Post-Treatment Pain Level 3  -KG         Exercise 1    Exercise Name 1 Pro II for warm-up/endurance  -KG      Cueing 1 Verbal  -KG      Time 1 6 min  -KG      Additional Comments L 4.0  -KG         Exercise 2    Exercise Name 2 Seated figure 4 S  -KG      Reps 2 2  -KG      Time 2 30 sec hold  -KG         Exercise 3    Exercise Name 3 Seated HS S  -KG      Reps 3 2  -KG      Time 3 30\" hold  -KG         Exercise 4    Exercise Name 4 Seated PN/TA alt LAQ  -KG      Cueing 4 Verbal  -KG      Sets 4 1  -KG      Reps 4 15  -KG         Exercise 5    Exercise Name 5 Seated PN/TA alt marching  -KG      Cueing 5 Verbal  -KG      Sets 5 1  -KG      Reps 5 15  -KG         Exercise 6    Exercise Name 6 Standing alt hip abd SLR with posture/TA  -KG      Cueing 6 Verbal  -KG      Sets 6 1  -KG      Reps 6 15  -KG         Exercise 7    Exercise Name 7 Standing alt hip ext SLR with posture/TA  -KG      Cueing 7 Verbal  -KG      Sets 7 1  -KG      Reps 7 15  -KG         Exercise 8    Exercise Name 8 SKTC stretch deb  -KG      Reps 8 2  -KG      Time 8 30\" hold  -KG         Exercise 9    Exercise Name 9 HL piriformis stretch  -KG      Cueing 9 Verbal  -KG      Reps 9 2  -KG      " "Time 9 30\" hold  -KG         Exercise 10    Exercise Name 10 Supine ashlee hip flexor stretch, opp knee to chest  -KG      Cueing 10 Verbal  -KG      Reps 10 2 ea  -KG      Time 10 30\" hold  -KG         Exercise 11    Exercise Name 11 Bridges with iso adduction ball squeze  -KG      Cueing 11 Verbal  -KG      Sets 11 2  -KG      Reps 11 10  -KG      Time 11 5\" hold  -KG         Exercise 12    Exercise Name 12 SL clamshells deb  -KG      Cueing 12 Verbal  -KG      Reps 12 1  -KG      Time 12 15 ea direction  -KG         Exercise 13    Exercise Name 13 Prone TKE/GS  -KG      Cueing 13 Verbal  -KG      Sets 13 1  -KG      Reps 13 15  -KG      Time 13 5\"  -KG                User Key  (r) = Recorded By, (t) = Taken By, (c) = Cosigned By      Initials Name Provider Type    Cintia Rice, PT Physical Therapist                             Manual Rx (last 36 hours)       Manual Treatments       Row Name 09/13/23 1600             Manual Rx 1    Manual Rx 1 Location Prone: L sacral base  -KG      Manual Rx 1 Type Passive mobility, PA's, STM/MFR to post hips/glutes  -KG      Manual Rx 1 Duration 5 min  -KG                User Key  (r) = Recorded By, (t) = Taken By, (c) = Cosigned By      Initials Name Provider Type    Cintia Rice, PT Physical Therapist                     PT OP Goals       Row Name 09/13/23 1600          PT Short Term Goals    STG Date to Achieve 09/26/23  -KG     STG 1 Demonstrate independence/compliance in performance of initial HEP  -KG     STG 1 Progress Ongoing  -KG     STG 2 Demonstrate independence in performance of isometric TA/kegel contraction with therex in various positions for functional carryover into daily activity performance.  -KG     STG 2 Progress Ongoing  -KG     STG 3 Tolerate 15 minutes of seated stabilization activities demonstrating proper seated/postural positioning with increased pain  -KG     STG 3 Progress Ongoing  -KG     STG 4 Subjectively report ability to sleep " through half the night in her bed without increased low back pain  -KG     STG 4 Progress Ongoing  -KG        Long Term Goals    LTG Date to Achieve 10/17/23  -KG     LTG 1 ubjectively report 60% overall improvement in pain/symptoms and functional activity tolerance  -KG     LTG 2 Demonstrate improved bilateral hip abd/ext MMT to 5/5  -KG     LTG 3 Subjectively report ability to sleep through the night in her bed without increased pain  -KG     LTG 4 Demonstrate proper lifting/body mechanics when lifting weighted crate from various surface heights without increased pain  -KG     LTG 5 Subjectively report ability to sleep through the night in her bed without increased pain  -KG     LTG 6 Demonstrate independence in final HEP for continued management & improvement in signs/symptoms  -KG        Time Calculation    PT Goal Re-Cert Due Date 09/26/23  -KG               User Key  (r) = Recorded By, (t) = Taken By, (c) = Cosigned By      Initials Name Provider Type    KG Cintia Rodriguez, PT Physical Therapist                    Therapy Education  Education Details: Seated tband hip abd, LAQ, marching - all with PN/TA  Given: HEP, Symptoms/condition management, Pain management  Program: Reinforced, Progressed  How Provided: Verbal, Demonstration, Written  Provided to: Patient  Level of Understanding: Teach back education performed, Verbalized, Demonstrated              Time Calculation:   Start Time: 1600  Stop Time: 1647  Time Calculation (min): 47 min  Therapy Charges for Today       Code Description Service Date Service Provider Modifiers Qty    90756659439 HC PT THER PROC EA 15 MIN 9/13/2023 Cintia Rodriguez, PT GP 3                      Cintia Rodirguez, PT  9/13/2023

## 2023-09-18 ENCOUNTER — HOSPITAL ENCOUNTER (OUTPATIENT)
Dept: PHYSICAL THERAPY | Facility: HOSPITAL | Age: 48
Setting detail: THERAPIES SERIES
Discharge: HOME | End: 2023-09-18
Payer: COMMERCIAL

## 2023-09-18 DIAGNOSIS — M54.9 CHRONIC BACK PAIN, UNSPECIFIED BACK LOCATION, UNSPECIFIED BACK PAIN LATERALITY: Primary | ICD-10-CM

## 2023-09-18 DIAGNOSIS — G89.29 CHRONIC BACK PAIN, UNSPECIFIED BACK LOCATION, UNSPECIFIED BACK PAIN LATERALITY: Primary | ICD-10-CM

## 2023-09-18 PROCEDURE — 97110 THERAPEUTIC EXERCISES: CPT

## 2023-09-18 PROCEDURE — 97140 MANUAL THERAPY 1/> REGIONS: CPT

## 2023-09-18 NOTE — THERAPY TREATMENT NOTE
"  Outpatient Physical Therapy Ortho Treatment Note  [facility]     Patient Name: Екатерина Stovall  : 1975  MRN: [id number]  Today's Date: 2023      Visit Date: 2023    Attendance: 4 visits  Subjective improvement: \"a little better\"  MD appt: PRN  Recheck due: 23    Visit Dx:    ICD-10-CM ICD-9-CM   1. Chronic back pain, unspecified back location, unspecified back pain laterality  M54.9 724.5    G89.29 338.29       There is no problem list on file for this patient.       Past Medical History:   Diagnosis Date    Allergic 2011    Cats,horses,dogs, trees and seasonal allergies    Anxiety     Arrhythmia     COVID-19 2022    Hyperlipidemia     Migraine     Seasonal allergies         Past Surgical History:   Procedure Laterality Date    APPENDECTOMY      ARM LESION/CYST EXCISION Left 2022    Procedure: LEFT RING FINGER NAILBED LESION EXCISIONAL BIOPSY;  Surgeon: Harry Holt MD;  Location: [facility] OR;  Service: Plastics;  Laterality: Left;    FINGER MASS EXCISION      LAPAROSCOPIC SALPINGOOPHERECTOMY Bilateral     OOPHORECTOMY      TOTAL ABDOMINAL HYSTERECTOMY      RIVKA w/ BSO due to pain caused by varicose veins and heavy bleeding.    TUBAL ABDOMINAL LIGATION          PT Ortho       Row Name 23 1600       Subjective    Subjective Comments Pt states she had some pain when first got up but ok now; no pain.  -PM       Precautions and Contraindications    Precautions/Limitations no known precautions/limitations  -PM       Subjective Pain    Able to rate subjective pain? yes  -PM    Pre-Treatment Pain Level 0  -PM    Post-Treatment Pain Level 0  -PM       Posture/Observations    Posture/Observations Comments L IC w/LE short; R ASIS inf to L: LAD improved LL but L IC still a little more superior and ASIS still asymmetrical: MET corrected ASIS and manual relaxed QL  -PM              User Key  (r) = Recorded By, (t) = Taken By, (c) = Cosigned By      Initials Name Provider " "Type    PM Vanna Goel PTA Physical Therapist Assistant                                 PT Assessment/Plan       Row Name 09/18/23 1600          PT Assessment    Assessment Comments Pt does well with seated core stab and should be ready to progress to dynadisc; more cues on standing as does tend to kick too high as well as some incr lumbar lordosis/cautioned pt to know hyperextend knees standing.  -PM        PT Plan    PT Frequency 2x/week  -PM     PT Plan Comments progress to meseret disc seated core; ball wall squats with TA; review std hip therex/core stability with emphasis on core and not high kicks; monitor alignment; possible SL hip abd SLR vs clam  -PM               User Key  (r) = Recorded By, (t) = Taken By, (c) = Cosigned By      Initials Name Provider Type    PM Vanna Goel PTA Physical Therapist Assistant                       OP Exercises       Row Name 09/18/23 1600             Subjective    Subjective Comments Pt states she had some pain when first got up but ok now; no pain.  -PM         Subjective Pain    Able to rate subjective pain? yes  -PM      Pre-Treatment Pain Level 0  -PM      Post-Treatment Pain Level 0  -PM         Exercise 1    Exercise Name 1 Pro II for warm-up/endurance  -PM      Cueing 1 Verbal  -PM      Time 1 8 min  -PM      Additional Comments L4  -PM         Exercise 2    Exercise Name 2 Seated figure 4 S  -PM      Reps 2 2  -PM      Time 2 30 sec hold  -PM         Exercise 3    Exercise Name 3 Seated HS S  -PM      Reps 3 2  -PM      Time 3 30\" hold  -PM         Exercise 4    Exercise Name 4 Seated PN/TA alt LAQ  -PM      Cueing 4 Verbal  -PM      Sets 4 2  -PM      Reps 4 10  -PM         Exercise 5    Exercise Name 5 Seated PN/TA alt marching  -PM      Cueing 5 Verbal  -PM      Sets 5 2  -PM      Reps 5 10  -PM      Time 5 .  -PM         Exercise 6    Exercise Name 6 Standing alt hip abd SLR with posture/TA  -PM      Cueing 6 Verbal  -PM      Sets 6 2  -PM      " "Reps 6 10  -PM         Exercise 7    Exercise Name 7 Standing alt hip ext SLR with posture/TA  -PM      Cueing 7 Verbal  -PM      Sets 7 2  -PM      Reps 7 10  -PM         Exercise 8    Exercise Name 8 SKTC stretch deb  -PM      Reps 8 2  -PM      Time 8 30\" hold  -PM         Exercise 9    Exercise Name 9 HL piriformis stretch  -PM      Cueing 9 Verbal  -PM      Reps 9 2  -PM      Time 9 30\" hold  -PM         Exercise 10    Exercise Name 10 Supine ashlee hip flexor stretch, opp knee to chest  -PM      Cueing 10 Verbal  -PM      Reps 10 2 ea  -PM      Time 10 30\" hold  -PM         Exercise 11    Exercise Name 11 Bridges with iso adduction ball squeze  -PM      Cueing 11 Verbal  -PM      Sets 11 2  -PM      Reps 11 10  -PM      Time 11 5\" hold  -PM         Exercise 12    Exercise Name 12 SL clamshells deb  -PM      Cueing 12 Verbal  -PM      Reps 12 1  -PM      Time 12 15 ea direction  -PM         Exercise 13    Exercise Name 13 stand lat pull down iso / TA  -PM      Cueing 13 Verbal  -PM      Sets 13 2  -PM      Reps 13 10  -PM      Time 13 5\"  -PM      Additional Comments GTB  -PM         Exercise 14    Exercise Name 14 sit<>stand iso TA/Kegal  -PM      Cueing 14 Verbal  -PM      Reps 14 15  -PM      Additional Comments no UE  -PM                User Key  (r) = Recorded By, (t) = Taken By, (c) = Cosigned By      Initials Name Provider Type    PM Vanna Goel, TRISTIN Physical Therapist Assistant                             Manual Rx (last 36 hours)       Manual Treatments       Row Name 09/18/23 3632             Manual Rx 1    Manual Rx 1 Location IS/SI/QL  -PM      Manual Rx 1 Type L LAD; direct pt MET with pvc R HS and L hip flexor  -PM      Manual Rx 1 Duration ~3-4 min total time  -PM         Manual Rx 2    Manual Rx 2 Location prone over flat pillow: TS/LS and sacral/post hips  -PM      Manual Rx 2 Type MFR/STM; gentle sacral mobs, gentle TS PA's T9-10-11; MFR L QL  -PM      Manual Rx 2 Duration ~6 to 7 min  " -PM                User Key  (r) = Recorded By, (t) = Taken By, (c) = Cosigned By      Initials Name Provider Type    PM Vanna Goel PTA Physical Therapist Assistant                     PT OP Goals       Row Name 09/18/23 1600          PT Short Term Goals    STG Date to Achieve 09/26/23  -PM     STG 1 Demonstrate independence/compliance in performance of initial HEP  -PM     STG 1 Progress Ongoing  -PM     STG 2 Demonstrate independence in performance of isometric TA/kegel contraction with therex in various positions for functional carryover into daily activity performance.  -PM     STG 2 Progress Ongoing  -PM     STG 3 Tolerate 15 minutes of seated stabilization activities demonstrating proper seated/postural positioning with increased pain  -PM     STG 3 Progress Ongoing  -PM     STG 4 Subjectively report ability to sleep through half the night in her bed without increased low back pain  -PM     STG 4 Progress Ongoing  -PM        Long Term Goals    LTG Date to Achieve 10/17/23  -PM     LTG 1 ubjectively report 60% overall improvement in pain/symptoms and functional activity tolerance  -PM     LTG 2 Demonstrate improved bilateral hip abd/ext MMT to 5/5  -PM     LTG 3 Subjectively report ability to sleep through the night in her bed without increased pain  -PM     LTG 4 Demonstrate proper lifting/body mechanics when lifting weighted crate from various surface heights without increased pain  -PM     LTG 5 Subjectively report ability to sleep through the night in her bed without increased pain  -PM     LTG 6 Demonstrate independence in final HEP for continued management & improvement in signs/symptoms  -PM        Time Calculation    PT Goal Re-Cert Due Date 09/26/23  -PM               User Key  (r) = Recorded By, (t) = Taken By, (c) = Cosigned By      Initials Name Provider Type    PM Vanna Goel PTA Physical Therapist Assistant                    Therapy Education  Given: HEP, Symptoms/condition  management, Pain management  Program: Reinforced  How Provided: Verbal, Demonstration, Written  Provided to: Patient  Level of Understanding: Teach back education performed, Verbalized, Demonstrated              Time Calculation:   Start Time: 1602  Stop Time: 1658  Time Calculation (min): 56 min  Therapy Charges for Today       Code Description Service Date Service Provider Modifiers Qty    26825459248 HC PT THER PROC EA 15 MIN 9/18/2023 Vanna Goel PTA GP, CQ 3    89712834810 HC PT MANUAL THERAPY EA 15 MIN 9/18/2023 Vanna Goel PTA GP, CQ 1                      Vanna Goel PTA  9/18/2023

## 2023-09-20 ENCOUNTER — HOSPITAL ENCOUNTER (OUTPATIENT)
Dept: PHYSICAL THERAPY | Facility: HOSPITAL | Age: 48
Setting detail: THERAPIES SERIES
Discharge: HOME | End: 2023-09-20
Payer: COMMERCIAL

## 2023-09-20 DIAGNOSIS — G89.29 CHRONIC BACK PAIN, UNSPECIFIED BACK LOCATION, UNSPECIFIED BACK PAIN LATERALITY: Primary | ICD-10-CM

## 2023-09-20 DIAGNOSIS — M54.9 CHRONIC BACK PAIN, UNSPECIFIED BACK LOCATION, UNSPECIFIED BACK PAIN LATERALITY: Primary | ICD-10-CM

## 2023-09-20 PROCEDURE — 97530 THERAPEUTIC ACTIVITIES: CPT

## 2023-09-20 PROCEDURE — 97110 THERAPEUTIC EXERCISES: CPT

## 2023-09-20 NOTE — THERAPY TREATMENT NOTE
Free Drug Application Approved  Effective Dates 1/13- 12/31/2021  Patient notifiedyes by Jose, f/u phone call from me  Additional Information:  Teach was completed 1/13 - med should be delivered early to mid next week.      Rigo Gant CPhT  [facility]  [facility] [email or web address]  [phone] (phone  [phone] (fax       "  Outpatient Physical Therapy Ortho Treatment Note  [facility]     Patient Name: Екатерина Stovall  : 1975  MRN: [id number]  Today's Date: 2023      Visit Date: 2023    Attendance: 5 visits  Subjective improvement: \"a little better\"  MD appt: PRN  Recheck due: 23    Visit Dx:    ICD-10-CM ICD-9-CM   1. Chronic back pain, unspecified back location, unspecified back pain laterality  M54.9 724.5    G89.29 338.29       There is no problem list on file for this patient.       Past Medical History:   Diagnosis Date    Allergic 2011    Cats,horses,dogs, trees and seasonal allergies    Anxiety     Arrhythmia     COVID-19 2022    Hyperlipidemia     Migraine     Seasonal allergies         Past Surgical History:   Procedure Laterality Date    APPENDECTOMY      ARM LESION/CYST EXCISION Left 2022    Procedure: LEFT RING FINGER NAILBED LESION EXCISIONAL BIOPSY;  Surgeon: Harry Holt MD;  Location: [facility] OR;  Service: Plastics;  Laterality: Left;    FINGER MASS EXCISION      LAPAROSCOPIC SALPINGOOPHERECTOMY Bilateral     OOPHORECTOMY      TOTAL ABDOMINAL HYSTERECTOMY      RIVKA w/ BSO due to pain caused by varicose veins and heavy bleeding.    TUBAL ABDOMINAL LIGATION          PT Ortho       Row Name 23 1600       Precautions and Contraindications    Precautions/Limitations no known precautions/limitations  -KM       Posture/Observations    Posture/Observations Comments R ASIS inferior. MET corrected.  -KM              User Key  (r) = Recorded By, (t) = Taken By, (c) = Cosigned By      Initials Name Provider Type     Cady Brock PTA Physical Therapist Assistant                                 PT Assessment/Plan       Row Name 23 1600          PT Assessment    Assessment Comments pt continues with malalignment at pelvis. pt required MET to correct. Did LAD bilaterally for symptom releif. Good performance and form with dynadisc exercises. Standing hip " abd/ext and march much improved form therefore added to HEP  -KM        PT Plan    PT Frequency 2x/week  -KM     PT Plan Comments Cont dynadisc. Cont to monitor alignment. Fwd step ups w/ TA  -KM               User Key  (r) = Recorded By, (t) = Taken By, (c) = Cosigned By      Initials Name Provider Type    Cady Vargas, PTA Physical Therapist Assistant                       OP Exercises       Row Name 09/20/23 1600             Subjective    Subjective Comments pt states that she went to [employer] class last night and was able to keep better core contraction and kept better alignment  -KM         Subjective Pain    Able to rate subjective pain? yes  -KM      Pre-Treatment Pain Level 0  -KM      Post-Treatment Pain Level 0  -KM         Exercise 1    Exercise Name 1 Pro II for warm-up/endurance  -KM      Time 1 8 min  -KM      Additional Comments L4.0  -KM         Exercise 2    Exercise Name 2 Seated figure 4 S  -KM      Reps 2 2  -KM      Time 2 30 sec hold  -KM         Exercise 3    Exercise Name 3 Standing HS S  -KM      Cueing 3 Verbal  -KM      Reps 3 2  -KM      Time 3 30 sec hold  -KM         Exercise 4    Exercise Name 4 Dynadisc TA/PN + LAQ  -KM      Sets 4 2  -KM      Reps 4 10  -KM         Exercise 5    Exercise Name 5 Dynadisc TA/PN + march  -KM      Sets 5 2  -KM      Reps 5 10  -KM         Exercise 6    Exercise Name 6 Standing alt hip abd SLR with posture/TA  -KM      Sets 6 2  -KM      Reps 6 10  -KM         Exercise 7    Exercise Name 7 Standing alt hip ext SLR with posture/TA  -KM      Sets 7 2  -KM      Reps 7 10  -KM         Exercise 8    Exercise Name 8 standing lat pull downs  -KM      Sets 8 2  -KM      Reps 8 10  -KM      Additional Comments green  -KM         Exercise 9    Exercise Name 9 Sit to stands TA/kegal  -KM      Sets 9 2  -KM      Reps 9 10  -KM         Exercise 10    Exercise Name 10 Airex alt march + TA/kegal  -KM      Sets 10 2  -KM      Reps 10 10  -KM         Exercise 11     Exercise Name 11 Supine SKTC S  -KM      Reps 11 2  -KM      Time 11 30 sec hold  -KM         Exercise 12    Exercise Name 12 HL piriformis S  -KM      Reps 12 2  -KM      Time 12 30 sec hold  -KM         Exercise 13    Exercise Name 13 Supine ashlee S  -KM      Reps 13 2  -KM      Time 13 30 sec hold  -KM         Exercise 14    Exercise Name 14 Bridges + iso add squeeze  -KM      Sets 14 2  -KM      Reps 14 10  -KM      Time 14 5 sec hold  -KM         Exercise 15    Exercise Name 15 Alignment  -KM                User Key  (r) = Recorded By, (t) = Taken By, (c) = Cosigned By      Initials Name Provider Type    Cady Vargas PTA Physical Therapist Assistant                             Manual Rx (last 36 hours)       Manual Treatments       Row Name 09/20/23 1500             Manual Rx 1    Manual Rx 1 Location R HS/L hip flexor MET  -KM      Manual Rx 1 Type MET performed simultaneously  -KM      Manual Rx 1 Duration 4x5 sec hold  -KM         Manual Rx 2    Manual Rx 2 Location Prone sacral mobes  -KM      Manual Rx 2 Duration 5 min  -KM                User Key  (r) = Recorded By, (t) = Taken By, (c) = Cosigned By      Initials Name Provider Type    Cady Vargas PTA Physical Therapist Assistant                     PT OP Goals       Row Name 09/20/23 1600          PT Short Term Goals    STG Date to Achieve 09/26/23  -KM     STG 1 Demonstrate independence/compliance in performance of initial HEP  -KM     STG 1 Progress Ongoing  -KM     STG 2 Demonstrate independence in performance of isometric TA/kegel contraction with therex in various positions for functional carryover into daily activity performance.  -KM     STG 2 Progress Ongoing  -KM     STG 3 Tolerate 15 minutes of seated stabilization activities demonstrating proper seated/postural positioning with increased pain  -KM     STG 3 Progress Ongoing  -KM     STG 4 Subjectively report ability to sleep through half the night in her bed  without increased low back pain  -KM     STG 4 Progress Ongoing  -KM        Long Term Goals    LTG Date to Achieve 10/17/23  -KM     LTG 1 ubjectively report 60% overall improvement in pain/symptoms and functional activity tolerance  -KM     LTG 2 Demonstrate improved bilateral hip abd/ext MMT to 5/5  -KM     LTG 3 Subjectively report ability to sleep through the night in her bed without increased pain  -KM     LTG 4 Demonstrate proper lifting/body mechanics when lifting weighted crate from various surface heights without increased pain  -KM     LTG 5 Subjectively report ability to sleep through the night in her bed without increased pain  -KM     LTG 6 Demonstrate independence in final HEP for continued management & improvement in signs/symptoms  -KM        Time Calculation    PT Goal Re-Cert Due Date 09/26/23  -KM               User Key  (r) = Recorded By, (t) = Taken By, (c) = Cosigned By      Initials Name Provider Type    Cady Vargas PTA Physical Therapist Assistant                    Therapy Education  Education Details: standing march/standing hip abd/ext  Given: HEP, Symptoms/condition management, Pain management  Program: Reinforced  How Provided: Verbal, Demonstration, Written  Provided to: Patient  Level of Understanding: Teach back education performed, Verbalized, Demonstrated              Time Calculation:   Start Time: 1600  Stop Time: 1650  Time Calculation (min): 50 min  Therapy Charges for Today       Code Description Service Date Service Provider Modifiers Qty    59191476628  PT THER PROC EA 15 MIN 9/20/2023 Cady Brock PTA GP, CQ 2    [id number]  PT THERAPEUTIC ACT EA 15 MIN 9/20/2023 Cady Brock PTA GP, CQ 1                      Cady Brock PTA  9/20/2023

## 2023-09-25 ENCOUNTER — HOSPITAL ENCOUNTER (OUTPATIENT)
Dept: PHYSICAL THERAPY | Facility: HOSPITAL | Age: 48
Setting detail: THERAPIES SERIES
Discharge: HOME | End: 2023-09-25
Payer: COMMERCIAL

## 2023-09-25 DIAGNOSIS — G89.29 CHRONIC BACK PAIN, UNSPECIFIED BACK LOCATION, UNSPECIFIED BACK PAIN LATERALITY: Primary | ICD-10-CM

## 2023-09-25 DIAGNOSIS — M54.9 CHRONIC BACK PAIN, UNSPECIFIED BACK LOCATION, UNSPECIFIED BACK PAIN LATERALITY: Primary | ICD-10-CM

## 2023-09-25 PROCEDURE — 97110 THERAPEUTIC EXERCISES: CPT | Performed by: PHYSICAL THERAPIST

## 2023-09-25 PROCEDURE — 97140 MANUAL THERAPY 1/> REGIONS: CPT | Performed by: PHYSICAL THERAPIST

## 2023-09-25 NOTE — THERAPY TREATMENT NOTE
"  Outpatient Physical Therapy Ortho Treatment Note  [facility]/[place]     Patient Name: Екатерина Stovall  : 1975  MRN: [id number]  Today's Date: 2023      Visit Date: 2023    Attendance: 6 visits  Subjective improvement: \"therapy is helping quite a bit\"  MD appt: PRN  Recheck due: 23    Visit Dx:    ICD-10-CM ICD-9-CM   1. Chronic back pain, unspecified back location, unspecified back pain laterality  M54.9 724.5    G89.29 338.29       There is no problem list on file for this patient.       Past Medical History:   Diagnosis Date    Allergic     Cats,horses,dogs, trees and seasonal allergies    Anxiety     Arrhythmia     COVID-19 2022    Hyperlipidemia     Migraine     Seasonal allergies         Past Surgical History:   Procedure Laterality Date    APPENDECTOMY      ARM LESION/CYST EXCISION Left 2022    Procedure: LEFT RING FINGER NAILBED LESION EXCISIONAL BIOPSY;  Surgeon: Harry Holt MD;  Location: [facility] OR;  Service: Plastics;  Laterality: Left;    FINGER MASS EXCISION      LAPAROSCOPIC SALPINGOOPHERECTOMY Bilateral     OOPHORECTOMY      TOTAL ABDOMINAL HYSTERECTOMY      RIVKA w/ BSO due to pain caused by varicose veins and heavy bleeding.    TUBAL ABDOMINAL LIGATION          PT Ortho       Row Name 23 1600       Precautions and Contraindications    Precautions/Limitations no known precautions/limitations  -KG       Posture/Observations    Posture/Observations Comments L IC superior vs R. ASIS equal. Corrected after LAD. Good overall postural awareness throughout treatment. Only minor cues required.  -KG              User Key  (r) = Recorded By, (t) = Taken By, (c) = Cosigned By      Initials Name Provider Type    Cintia Rice, PT Physical Therapist                                 PT Assessment/Plan       Row Name 23 1600          PT Assessment    Assessment Comments Pt did well with all treatment activities. Had some mild pain prior to start of " "treatment but pt noted it started to improve with therex. Good form/posture with multifidi step ups and tband pallof press. Doing well with seated dynadisc stability as well. Did have mild malalignment at pelvis but corrected with LAD only this date. Decreased pain post treatment.  -KG        PT Plan    PT Frequency 2x/week  -KG     PT Plan Comments Recheck next visit. Possibly try seated pball. Continue standing stability progressions.  -KG               User Key  (r) = Recorded By, (t) = Taken By, (c) = Cosigned By      Initials Name Provider Type    KG Cintia Rodriguez, PT Physical Therapist                       OP Exercises       Row Name 09/25/23 1600             Subjective    Subjective Comments Pt states she slept in her bed all night last night without any pain. Had some stiffness this morning but nothing else. States she \"tweaked\" her back right before leaving for therapy today and now it's sore. Wasn't having any pain prior to. States she doesn't know if she twisted wrong while getting up from her computer while working.  -KG         Subjective Pain    Able to rate subjective pain? yes  -KG      Pre-Treatment Pain Level 4  -KG      Post-Treatment Pain Level 2  -KG         Exercise 1    Exercise Name 1 Pro II for warm-up/endurance  -KG      Time 1 8 min  -KG      Additional Comments L 4.0  -KG         Exercise 2    Exercise Name 2 Seated figure 4 S  -KG      Reps 2 2  -KG      Time 2 30 sec hold  -KG         Exercise 3    Exercise Name 3 Standing deb HS S  -KG      Cueing 3 Verbal  -KG      Reps 3 2  -KG      Time 3 30 sec hold  -KG         Exercise 4    Exercise Name 4 Standing tband lat pulls with posture/TA  -KG      Cueing 4 Verbal  -KG      Sets 4 2  -KG      Reps 4 10  -KG      Additional Comments green tband  -KG         Exercise 5    Exercise Name 5 Standing tband pallof press with TA  -KG      Cueing 5 Verbal  -KG      Sets 5 1  -KG      Reps 5 20 ea direction  -KG      Additional Comments green " "tband  -KG         Exercise 6    Exercise Name 6 Fwd step ups with opp hip ext SLR/TA  -KG      Cueing 6 Verbal  -KG      Sets 6 1  -KG      Reps 6 15 ea LE  -KG      Additional Comments 6\" step at stairs  -KG         Exercise 7    Exercise Name 7 Lateral step ups with opp hip abd SLR/TA  -KG      Cueing 7 Verbal  -KG      Sets 7 1  -KG      Reps 7 15 ea LE  -KG      Additional Comments 4\" bench step  -KG         Exercise 8    Exercise Name 8 Dynadisc seated PN/TA alt LAQ  -KG      Cueing 8 Verbal  -KG      Sets 8 2  -KG      Reps 8 10  -KG         Exercise 9    Exercise Name 9 Dynadisc seated PN/TA alt marching  -KG      Cueing 9 Verbal  -KG      Sets 9 2  -KG      Reps 9 10  -KG         Exercise 10    Exercise Name 10 Dynadisc seated PN/TA tband mid rows  -KG      Cueing 10 Verbal  -KG      Sets 10 2  -KG      Reps 10 10  -KG      Additional Comments red tband  -KG         Exercise 11    Exercise Name 11 Supine SKTC S  -KG      Reps 11 2  -KG      Time 11 30 sec hold  -KG         Exercise 12    Exercise Name 12 HL piriformis S  -KG      Reps 12 2  -KG      Time 12 30 sec hold  -KG         Exercise 13    Exercise Name 13 Bridges + iso add squeeze  -KG      Sets 13 1  -KG      Reps 13 15  -KG      Time 13 5\" hold  -KG                User Key  (r) = Recorded By, (t) = Taken By, (c) = Cosigned By      Initials Name Provider Type    Cintia Rice, PT Physical Therapist                             Manual Rx (last 36 hours)       Manual Treatments       Row Name 09/25/23 1600             Manual Rx 1    Manual Rx 1 Location LLE/QL  -KG      Manual Rx 1 Type LAD  -KG      Manual Rx 1 Duration 3x20\"  -KG         Manual Rx 2    Manual Rx 2 Location Prone over flat pillow: Lumbar/post hips/sacrum  -KG      Manual Rx 2 Type MFR/STM; gentle sacral mobs  -KG      Manual Rx 2 Duration 7 min  -KG                User Key  (r) = Recorded By, (t) = Taken By, (c) = Cosigned By      Initials Name Provider Type    MARIA GUADALUPE Rodriguez" Cintia BALDWIN, PT Physical Therapist                     PT OP Goals       Row Name 09/25/23 1600          PT Short Term Goals    STG Date to Achieve 09/26/23  -KG     STG 1 Demonstrate independence/compliance in performance of initial HEP  -KG     STG 1 Progress Met  -KG     STG 2 Demonstrate independence in performance of isometric TA/kegel contraction with therex in various positions for functional carryover into daily activity performance.  -KG     STG 2 Progress Met  -KG     STG 3 Tolerate 15 minutes of seated stabilization activities demonstrating proper seated/postural positioning without increased pain  -KG     STG 3 Progress Met  -KG     STG 4 Subjectively report ability to sleep through half the night in her bed without increased low back pain  -KG     STG 4 Progress Ongoing  -KG        Long Term Goals    LTG Date to Achieve 10/17/23  -KG     LTG 1 ubjectively report 60% overall improvement in pain/symptoms and functional activity tolerance  -KG     LTG 1 Progress Progressing  -KG     LTG 2 Demonstrate improved bilateral hip abd/ext MMT to 5/5  -KG     LTG 2 Progress Progressing  -KG     LTG 3 Subjectively report ability to sleep through the night in her bed without increased pain  -KG     LTG 3 Progress Progressing  -KG     LTG 4 Demonstrate proper lifting/body mechanics when lifting weighted crate from various surface heights without increased pain  -KG     LTG 4 Progress Progressing  -KG     LTG 5 Subjectively report ability to sleep through the night in her bed without increased pain  -KG     LTG 5 Progress Progressing  -KG     LTG 6 Demonstrate independence in final HEP for continued management & improvement in signs/symptoms  -KG     LTG 6 Progress Progressing  -KG        Time Calculation    PT Goal Re-Cert Due Date 09/26/23  -KG               User Key  (r) = Recorded By, (t) = Taken By, (c) = Cosigned By      Initials Name Provider Type    KG Cintia Rodriguez, PT Physical Therapist                     Therapy Education  Education Details: Standing tband pallof press  Given: HEP, Symptoms/condition management, Pain management  Program: Reinforced  How Provided: Verbal, Demonstration, Written  Provided to: Patient  Level of Understanding: Teach back education performed, Verbalized, Demonstrated              Time Calculation:   Start Time: 1650  Stop Time: 1738  Time Calculation (min): 48 min  Therapy Charges for Today       Code Description Service Date Service Provider Modifiers Qty    74109455542  PT THER PROC EA 15 MIN 9/25/2023 Cintia Rodriguez, PT GP 2    [id number]  PT MANUAL THERAPY EA 15 MIN 9/25/2023 Cintia Rodriguez, PT GP 1                      Cintia Rodriguez, PT  9/25/2023

## 2023-09-27 ENCOUNTER — HOSPITAL ENCOUNTER (OUTPATIENT)
Dept: PHYSICAL THERAPY | Facility: HOSPITAL | Age: 48
Setting detail: THERAPIES SERIES
Discharge: HOME | End: 2023-09-27
Payer: COMMERCIAL

## 2023-09-27 DIAGNOSIS — M54.9 CHRONIC BACK PAIN, UNSPECIFIED BACK LOCATION, UNSPECIFIED BACK PAIN LATERALITY: Primary | ICD-10-CM

## 2023-09-27 DIAGNOSIS — G89.29 CHRONIC BACK PAIN, UNSPECIFIED BACK LOCATION, UNSPECIFIED BACK PAIN LATERALITY: Primary | ICD-10-CM

## 2023-09-27 PROCEDURE — 97110 THERAPEUTIC EXERCISES: CPT | Performed by: PHYSICAL THERAPIST

## 2023-09-30 NOTE — THERAPY PROGRESS REPORT/RE-CERT
Outpatient Physical Therapy Ortho Progress Note  [facility]     Patient Name: Екатерина Stovall  : 1975  MRN: [id number]  Today's Date: 2023      Visit Date: 2023    Attendance: 7 visits  Subjective improvement: 50-60%  MD appt: PRN  Recheck due: 10/18/2023      Visit Dx:    ICD-10-CM ICD-9-CM   1. Chronic back pain, unspecified back location, unspecified back pain laterality  M54.9 724.5    G89.29 338.29       There is no problem list on file for this patient.       Past Medical History:   Diagnosis Date    Allergic 2011    Cats,horses,dogs, trees and seasonal allergies    Anxiety     Arrhythmia     COVID-19 2022    Hyperlipidemia     Migraine     Seasonal allergies         Past Surgical History:   Procedure Laterality Date    APPENDECTOMY      ARM LESION/CYST EXCISION Left 2022    Procedure: LEFT RING FINGER NAILBED LESION EXCISIONAL BIOPSY;  Surgeon: Harry Holt MD;  Location: [facility] OR;  Service: Plastics;  Laterality: Left;    FINGER MASS EXCISION      LAPAROSCOPIC SALPINGOOPHERECTOMY Bilateral     OOPHORECTOMY      TOTAL ABDOMINAL HYSTERECTOMY      RIVKA w/ BSO due to pain caused by varicose veins and heavy bleeding.    TUBAL ABDOMINAL LIGATION          PT Ortho       Row Name 23 1600       Subjective    Subjective Comments Pt states her back is doing a lot better. Has been able to sleep some in her bed but still can't make it the whole night yet. States she feels good today. Reports 50-60% overall improvement  -KG       Precautions and Contraindications    Precautions/Limitations no known precautions/limitations  -KG       Subjective Pain    Able to rate subjective pain? yes  -KG    Pre-Treatment Pain Level 0  -KG    Post-Treatment Pain Level 0  -KG       Posture/Observations    Posture/Observations Comments No acute distress. Good overall postural awareness throughout treatment, only minor cues needed. Supine superior L IC. Equal after LAD.  -KG        Neural Tension Signs- Lower Quarter Clearing    Slump Bilateral:;Negative  -KG    SLR Bilateral:;Negative  -KG       Sensory Screen for Light Touch- Lower Quarter Clearing    L1 (inguinal area) Bilateral:;Intact  -KG    L2 (anterior mid thigh) Bilateral:;Intact  -KG    L3 (distal anterior thigh) Bilateral:;Intact  -KG    L4 (medial lower leg/foot) Bilateral:;Intact  -KG    L5 (lateral lower leg/great toe) Bilateral:;Intact  -KG    S1 (bottom of foot) Bilateral:;Intact  -KG       SI/Hip Screen- Lower Quarter Clearing    ASIS compression Bilateral:;Negative  -KG    ASIS distraction Bilateral:;Negative  -KG    Mikel's/Omar's test Bilateral:;Negative  -KG    Posterior thigh sheer Bilateral:;Negative  -KG       Thoracic Accessory Motions    Pa glide- Middle thoracic Center:;Hypomobile  -KG    Pa glide- Lower thoracic Center:;Hypomobile  -KG       Lumbosacral Palpation    Lumbosacral Palpation Comments Mild TTP at sacrum/post hips. Taut L>R.  -KG       General ROM    GENERAL ROM COMMENTS Bilateral hip, knee, ankle AROM WFL  -KG       Head/Neck/Trunk    Trunk Extension AROM WFL with low back pain  -KG    Trunk Flexion AROM Fingertips to proximal ankles, no pain  -KG    Trunk Lt Lateral Flexion AROM WFL no pain  -KG    Trunk Rt Lateral Flexion AROM WFL with mild pain a R low back  -KG    Trunk Lt Rotation AROM WFL no pain  -KG    Trunk Rt Rotation AROM WFL no pain  -KG       MMT Right Lower Ext    Rt Hip Flexion MMT, Gross Movement (5/5) normal  -KG    Rt Hip Extension MMT, Gross Movement (4+/5) good plus  -KG    Rt Hip ABduction MMT, Gross Movement (5/5) normal  -KG    Rt Hip ADduction MMT, Gross Movement (5/5) normal  -KG    Rt Knee Extension MMT, Gross Movement (5/5) normal  -KG    Rt Knee Flexion MMT, Gross Movement (5/5) normal  -KG    Rt Ankle Plantarflexion MMT, Gross Movement (5/5) normal  -KG    Rt Ankle Dorsiflexion MMT, Gross Movement (5/5) normal  -KG       MMT Left Lower Ext    Lt Hip Flexion MMT, Gross  Movement (5/5) normal  -KG    Lt Hip Extension MMT, Gross Movement (4+/5) good plus  -KG    Lt Hip ABduction MMT, Gross Movement (5/5) normal  -KG    Lt Hip ADduction MMT, Gross Movement (5/5) normal  -KG    Lt Knee Extension MMT, Gross Movement (5/5) normal  -KG    Lt Knee Flexion MMT, Gross Movement (5/5) normal  -KG    Lt Ankle Plantarflexion MMT, Gross Movement (5/5) normal  -KG    Lt Ankle Dorsiflexion MMT, Gross Movement (5/5) normal  -KG       Sensation    Sensation WNL? WFL  -KG    Light Touch No apparent deficits  -KG       Lower Extremity Flexibility    Hamstrings Bilateral:;Mildly limited  -KG    Hip Flexors Bilateral:;Mildly limited  -KG    Hip External Rotators Bilateral:;Mildly limited  -KG    Hip Internal Rotators Bilateral:;Mildly limited  -KG       Transfers    Comment, (Transfers) Reminder cues for log roll with sup<>sit transfer  -KG       Gait/Stairs (Locomotion)    Comment, (Gait/Stairs) Non-antalgic gait with no AD.  -KG              User Key  (r) = Recorded By, (t) = Taken By, (c) = Cosigned By      Initials Name Provider Type    KG Cintia Rodriguez, PT Physical Therapist                       PT Assessment/Plan       Row Name 09/27/23 1600          PT Assessment    Functional Limitations Limitation in home management;Limitations in community activities;Performance in leisure activities;Limitations in functional capacity and performance;Performance in work activities  -KG     Impairments Impaired flexibility;Impaired muscle endurance;Joint mobility;Muscle strength;Pain;Posture;Poor body mechanics  -KG     Assessment Comments Pt is progressing well with PT at this time. Good improvements noted in overall core and postural stability. Pt slightly more challenged with seated physioball stability vs dyandisc & requires single UE support; did well keeping good positioning with cues. Pt doing well with strength & stability progressions. No increased pain with any activities this date. Pt maintaining  better lumbopelvic alignment as strength & flexibility are improving. Pt is compliant with HEP and reports 50-60% overall improvement. Pt making good progrses towards goals but still needs work on functional strength, core/postural stability,  awareness, & functonal activity tolerance and will benefit from continued skilled PT services.  -KG     Rehab Potential Good  -KG     Patient/caregiver participated in establishment of treatment plan and goals Yes  -KG     Patient would benefit from skilled therapy intervention Yes  -KG        PT Plan    PT Frequency 1x/week  -KG     Predicted Duration of Therapy Intervention (PT) 6 more visits  -KG     PT Plan Comments Continue progressing dynamic core stability and functional strength. Physioball wall squats. Work towards quadruped activities. Resume multifii step ups. Continue seated pball core.  -KG               User Key  (r) = Recorded By, (t) = Taken By, (c) = Cosigned By      Initials Name Provider Type    KG Cintia Rodriguez, PT Physical Therapist                           OP Exercises       Row Name 09/27/23 1600             Subjective    Subjective Comments Pt states her back is doing a lot better. Has been able to sleep some in her bed but still can't make it the whole night yet. States she feels good today. Reports 50-60% overall improvement  -KG         Subjective Pain    Able to rate subjective pain? yes  -KG      Pre-Treatment Pain Level 0  -KG      Post-Treatment Pain Level 0  -KG         Exercise 1    Exercise Name 1 Pro II for warm-up/endurance  -KG      Time 1 8 min  -KG      Additional Comments L 4.0  -KG         Exercise 2    Exercise Name 2 Seated figure 4 S  -KG      Reps 2 2  -KG      Time 2 30 sec hold  -KG         Exercise 3    Exercise Name 3 Standing deb HS S  -KG      Cueing 3 Verbal  -KG      Reps 3 2  -KG      Time 3 30 sec hold  -KG         Exercise 4    Exercise Name 4 Standing tband lat pulls with posture/TA  -KG      Cueing 4  "Verbal  -KG      Sets 4 2  -KG      Reps 4 10  -KG      Additional Comments green tband  -KG         Exercise 5    Exercise Name 5 Standing tband pallof press with TA  -KG      Cueing 5 Verbal  -KG      Sets 5 1  -KG      Reps 5 20 ea direction  -KG      Additional Comments green tband  -KG         Exercise 6    Exercise Name 6 Lumbar/trunk/BLE ROM & strength measurements  -KG         Exercise 8    Exercise Name 8 Physioball seated PN/TA alt LAQ  -KG      Cueing 8 Verbal  -KG      Sets 8 2  -KG      Reps 8 10  -KG         Exercise 9    Exercise Name 9 Physioball seated PN/TA alt marching  -KG      Cueing 9 Verbal  -KG      Sets 9 2  -KG      Reps 9 10  -KG         Exercise 10    Exercise Name 10 Physioball seated PN/TA tband mid rows  -KG      Cueing 10 Verbal  -KG      Sets 10 2  -KG      Reps 10 10  -KG      Additional Comments red tband  -KG         Exercise 11    Exercise Name 11 Supine SKTC S  -KG      Reps 11 2  -KG      Time 11 30 sec hold  -KG         Exercise 12    Exercise Name 12 HL piriformis S  -KG      Reps 12 2  -KG      Time 12 30 sec hold  -KG         Exercise 13    Exercise Name 13 Bridges + iso add squeeze  -KG      Sets 13 1  -KG      Reps 13 15  -KG      Time 13 5\" hold  -KG                User Key  (r) = Recorded By, (t) = Taken By, (c) = Cosigned By      Initials Name Provider Type    Cintia Rice, PT Physical Therapist                      Manual Rx (last 36 hours)       Manual Treatments       Row Name 09/27/23 1600             Manual Rx 1    Manual Rx 1 Location LLE/QL  -KG      Manual Rx 1 Type LAD  -KG      Manual Rx 1 Duration 3x20\"  -KG         Manual Rx 2    Manual Rx 2 Location Prone over flat pillow: Lumbar/post hips/sacrum  -KG      Manual Rx 2 Type MFR/STM; gentle sacral mobs  -KG      Manual Rx 2 Duration 5 min  -KG                User Key  (r) = Recorded By, (t) = Taken By, (c) = Cosigned By      Initials Name Provider Type    KG Cinita Rodriguez, PT Physical Therapist "                       PT OP Goals       Row Name 09/27/23 1600          PT Short Term Goals    STG Date to Achieve 09/26/23  -KG     STG 1 Demonstrate independence/compliance in performance of initial HEP  -KG     STG 1 Progress Met  -KG     STG 2 Demonstrate independence in performance of isometric TA/kegel contraction with therex in various positions for functional carryover into daily activity performance.  -KG     STG 2 Progress Met  -KG     STG 3 Tolerate 15 minutes of seated stabilization activities demonstrating proper seated/postural positioning without increased pain  -KG     STG 3 Progress Met  -KG     STG 4 Subjectively report ability to sleep through half the night in her bed without increased low back pain  -KG     STG 4 Progress Ongoing  -KG        Long Term Goals    LTG Date to Achieve 10/17/23  -KG     LTG 1 ubjectively report 60% overall improvement in pain/symptoms and functional activity tolerance  -KG     LTG 1 Progress Met  -KG     LTG 2 Demonstrate improved bilateral hip abd/ext MMT to 5/5  -KG     LTG 2 Progress Partially Met;Progressing  -KG     LTG 3 Subjectively report ability to sleep through the night in her bed without increased pain  -KG     LTG 3 Progress Progressing  -KG     LTG 4 Demonstrate proper lifting/body mechanics when lifting weighted crate from various surface heights without increased pain  -KG     LTG 4 Progress Progressing  -KG     LTG 5 Subjectively report ability to sleep through the night in her bed without increased pain  -KG     LTG 5 Progress Progressing  -KG     LTG 6 Demonstrate independence in final HEP for continued management & improvement in signs/symptoms  -KG     LTG 6 Progress Progressing  -KG        Time Calculation    PT Goal Re-Cert Due Date 09/26/23  -KG               User Key  (r) = Recorded By, (t) = Taken By, (c) = Cosigned By      Initials Name Provider Type    KG Cintia Rodriguez, PT Physical Therapist                               Therapy  Education  Given: HEP, Symptoms/condition management, Pain management  Program: Reinforced  How Provided: Verbal  Provided to: Patient  Level of Understanding: Verbalized, Demonstrated    Outcome Measure Options: Modified Oswestry  Modified Oswestry  Modified Oswestry Score/Comments: 4 = 8%      Time Calculation:   Start Time: 1600  Stop Time: 1646  Time Calculation (min): 46 min  Therapy Charges for Today       Code Description Service Date Service Provider Modifiers Qty    28409847381 HC PT THER PROC EA 15 MIN 9/27/2023 Cintia Rodriguez, PT GP 3            PT G-Codes  Outcome Measure Options: Modified Oswestry  Modified Oswestry Score/Comments: 4 = 8%         Cintia Rodriguez, PT  9/29/2023

## 2023-10-03 ENCOUNTER — TELEMEDICINE (OUTPATIENT)
Dept: OBSTETRICS AND GYNECOLOGY | Facility: CLINIC | Age: 48
End: 2023-10-03
Payer: COMMERCIAL

## 2023-10-03 DIAGNOSIS — E66.9 CLASS 1 OBESITY WITHOUT SERIOUS COMORBIDITY WITH BODY MASS INDEX (BMI) OF 31.0 TO 31.9 IN ADULT, UNSPECIFIED OBESITY TYPE: ICD-10-CM

## 2023-10-03 RX ORDER — LIRAGLUTIDE 6 MG/ML
3 INJECTION, SOLUTION SUBCUTANEOUS DAILY
Qty: 15 ML | Refills: 5 | Status: SHIPPED | OUTPATIENT
Start: 2023-10-03

## 2023-10-03 NOTE — PROGRESS NOTES
Patient seen via video visit.    Subjective   Екатерина Stovall is a 47 y.o. female  YOB: 1975      No chief complaint on file.      Patient here for f/u after starting Phentermine.      The following portions of the patient's history were reviewed and updated as appropriate: allergies, current medications, past family history, past medical history, past social history, past surgical history, and problem list.    No Known Allergies    Past Medical History:   Diagnosis Date    Allergic 2011    Cats,horses,dogs, trees and seasonal allergies    Anxiety     Arrhythmia     COVID-19 07/2022    Hyperlipidemia     Migraine     Seasonal allergies        Family History   Problem Relation Age of Onset    Ovarian cancer Mother     Arthritis Mother     Asthma Mother     Cancer Mother     Thyroid disease Mother     Breast cancer Paternal Grandmother     Breast cancer Maternal Grandmother     Cancer Maternal Grandmother     Uterine cancer Maternal Aunt     Breast cancer Maternal Aunt     Cancer Maternal Aunt     Heart disease Father     Hyperlipidemia Father     Diabetes Father     Hypertension Father     Cancer Maternal Aunt     Colon cancer Neg Hx     Melanoma Neg Hx        Social History     Socioeconomic History    Marital status:    Tobacco Use    Smoking status: Never    Smokeless tobacco: Never   Vaping Use    Vaping Use: Never used   Substance and Sexual Activity    Alcohol use: Not Currently     Comment: I very seldom have a glass of wine    Drug use: Never    Sexual activity: Yes     Partners: Male     Birth control/protection: Hysterectomy         Current Outpatient Medications:     Ascorbic Acid (VITAMIN C PO), Take 1 tablet by mouth Daily., Disp: , Rfl:     atorvastatin (LIPITOR) 40 MG tablet, Take 1 tablet by mouth Every Night., Disp: 90 tablet, Rfl: 0    escitalopram (LEXAPRO) 10 MG tablet, Take 1 tablet by mouth Daily., Disp: 90 tablet, Rfl: 3    estradiol (ESTRACE) 1 MG tablet, TAKE 1 TABLET BY  MOUTH DAILY, Disp: 90 tablet, Rfl: 0    levocetirizine (XYZAL) 5 MG tablet, Take 1 tablet by mouth Every Evening., Disp: , Rfl:     Liraglutide (Saxenda) 18 MG/3ML injection pen, Inject 3 mg under the skin into the appropriate area as directed Daily., Disp: 15 mL, Rfl: 5    montelukast (SINGULAIR) 10 MG tablet, Take 1 tablet by mouth every night at bedtime., Disp: 90 tablet, Rfl: 3    Multiple Vitamins-Minerals (ZINC PO), Take 1 tablet by mouth Daily., Disp: , Rfl:     phentermine 37.5 MG capsule, Take 1 capsule by mouth Every Morning., Disp: 30 capsule, Rfl: 0    Probiotic Product (Align) 4 MG capsule, Take 2 tablets by mouth Daily., Disp: , Rfl:     No LMP recorded. Patient has had a hysterectomy.    Sexual History:         Could not be calculated    Past Surgical History:   Procedure Laterality Date    APPENDECTOMY      ARM LESION/CYST EXCISION Left 01/21/2022    Procedure: LEFT RING FINGER NAILBED LESION EXCISIONAL BIOPSY;  Surgeon: Harry Holt MD;  Location: [facility];  Service: Plastics;  Laterality: Left;    FINGER MASS EXCISION      LAPAROSCOPIC SALPINGOOPHERECTOMY Bilateral     OOPHORECTOMY      TOTAL ABDOMINAL HYSTERECTOMY      RIVKA w/ BSO due to pain caused by varicose veins and heavy bleeding.    TUBAL ABDOMINAL LIGATION         Review of Systems   Constitutional:  Negative for activity change, appetite change, chills, diaphoresis, fatigue, fever and unexpected weight change.   HENT:  Negative for congestion, dental problem, drooling, ear discharge, ear pain, facial swelling, hearing loss, mouth sores, nosebleeds, postnasal drip, rhinorrhea, sinus pressure, sinus pain, sneezing, sore throat, tinnitus, trouble swallowing and voice change.    Eyes:  Negative for photophobia, pain, discharge, redness, itching and visual disturbance.   Respiratory:  Negative for apnea, cough, choking, chest tightness, shortness of breath, wheezing and stridor.    Cardiovascular:  Negative for chest pain, palpitations  and leg swelling.   Gastrointestinal:  Negative for abdominal distention, abdominal pain, anal bleeding, blood in stool, constipation, diarrhea, nausea, rectal pain and vomiting.   Endocrine: Negative for cold intolerance, heat intolerance, polydipsia, polyphagia and polyuria.   Genitourinary:  Negative for decreased urine volume, difficulty urinating, dyspareunia, dysuria, enuresis, flank pain, frequency, genital sores, hematuria, menstrual problem, pelvic pain, urgency, vaginal bleeding, vaginal discharge and vaginal pain.   Musculoskeletal:  Negative for arthralgias, back pain, gait problem, joint swelling, myalgias, neck pain and neck stiffness.   Skin:  Negative for color change, pallor, rash and wound.   Allergic/Immunologic: Negative for environmental allergies, food allergies and immunocompromised state.   Neurological:  Negative for dizziness, tremors, seizures, syncope, facial asymmetry, speech difficulty, weakness, light-headedness, numbness and headaches.   Hematological:  Negative for adenopathy. Does not bruise/bleed easily.   Psychiatric/Behavioral:  Negative for agitation, behavioral problems, confusion, decreased concentration, dysphoric mood, hallucinations, self-injury, sleep disturbance and suicidal ideas. The patient is not nervous/anxious and is not hyperactive.      Objective   Physical Exam  Vitals reviewed: No physical exam.         There were no vitals filed for this visit.    Diagnoses and all orders for this visit:    1. Class 1 obesity without serious comorbidity with body mass index (BMI) of 31.0 to 31.9 in adult, unspecified obesity type  Comments:  Patient reports she was unable to tolerate Phentermine r/t heart racing and jitters.  Discussed options and risks vs benefits.  RX for Saxenda sent to pharmacy and patient instructed in use.  RTO in 4 weeks for f/u or sooner prn.  Orders:  -     Liraglutide (Saxenda) 18 MG/3ML injection pen; Inject 3 mg under the skin into the appropriate  area as directed Daily.  Dispense: 15 mL; Refill: 5                        Non-Smoker    MyChart Instructions Given

## 2023-12-01 DIAGNOSIS — E78.2 MIXED HYPERLIPIDEMIA: ICD-10-CM

## 2023-12-01 RX ORDER — ATORVASTATIN CALCIUM 40 MG/1
40 TABLET, FILM COATED ORAL NIGHTLY
Qty: 90 TABLET | Refills: 0 | Status: SHIPPED | OUTPATIENT
Start: 2023-12-01

## 2023-12-01 NOTE — TELEPHONE ENCOUNTER
Rx Refill Note  Requested Prescriptions     Pending Prescriptions Disp Refills    atorvastatin (LIPITOR) 40 MG tablet 90 tablet 0     Sig: Take 1 tablet by mouth Every Night.      Last office visit with prescribing clinician: 8/7/23   Last telemedicine visit with prescribing clinician: Visit date not found   Next office visit with prescribing clinician: Visit date not found       {TIP  Please add Last Relevant Lab 4/13/23    Deisi Hilton MA  12/01/23, 14:14 CST

## 2024-01-02 ENCOUNTER — PATIENT MESSAGE (OUTPATIENT)
Dept: OBSTETRICS AND GYNECOLOGY | Facility: CLINIC | Age: 49
End: 2024-01-02
Payer: COMMERCIAL

## 2024-01-03 NOTE — TELEPHONE ENCOUNTER
I prescribed Phentermine for her 9/7/23 so that's probably what her insurance company is talking about.  She'll need to discuss with her insurance company and then I'll be glad to prescribe

## 2024-01-29 RX ORDER — LEVOCETIRIZINE DIHYDROCHLORIDE 5 MG/1
5 TABLET, FILM COATED ORAL EVERY EVENING
Qty: 90 TABLET | Refills: 1 | Status: SHIPPED | OUTPATIENT
Start: 2024-01-29

## 2024-01-29 NOTE — TELEPHONE ENCOUNTER
Rx Refill Note  Requested Prescriptions     Pending Prescriptions Disp Refills    levocetirizine (XYZAL) 5 MG tablet       Sig: Take 1 tablet by mouth Every Evening.      Last office visit with prescribing clinician: 8/7/2023   Last telemedicine visit with prescribing clinician: Visit date not found   Next office visit with prescribing clinician:     Deisi Hilton MA  01/29/24, 08:08 CST

## 2024-02-07 ENCOUNTER — OFFICE VISIT (OUTPATIENT)
Dept: FAMILY MEDICINE CLINIC | Facility: CLINIC | Age: 49
End: 2024-02-07
Payer: COMMERCIAL

## 2024-02-07 VITALS
TEMPERATURE: 97.4 F | DIASTOLIC BLOOD PRESSURE: 78 MMHG | HEART RATE: 71 BPM | RESPIRATION RATE: 16 BRPM | HEIGHT: 66 IN | OXYGEN SATURATION: 98 % | WEIGHT: 193.4 LBS | SYSTOLIC BLOOD PRESSURE: 128 MMHG | BODY MASS INDEX: 31.08 KG/M2

## 2024-02-07 DIAGNOSIS — Z12.11 ENCOUNTER FOR SCREENING COLONOSCOPY: ICD-10-CM

## 2024-02-07 DIAGNOSIS — M60.9 MYOSITIS, UNSPECIFIED MYOSITIS TYPE, UNSPECIFIED SITE: ICD-10-CM

## 2024-02-07 DIAGNOSIS — R53.83 FATIGUE, UNSPECIFIED TYPE: Primary | ICD-10-CM

## 2024-02-07 DIAGNOSIS — M65.9 SYNOVITIS: ICD-10-CM

## 2024-02-07 DIAGNOSIS — E78.2 MIXED HYPERLIPIDEMIA: ICD-10-CM

## 2024-02-07 DIAGNOSIS — Z11.59 NEED FOR HEPATITIS C SCREENING TEST: ICD-10-CM

## 2024-02-07 NOTE — PROGRESS NOTES
Subjective   Екатерина Stovall is a 48 y.o. female.     History of Present Illness  48-year-old female with multiple aches pains hyperlipidemia      The following portions of the patient's history were reviewed and updated as appropriate: allergies, current medications, past family history, past medical history, past social history, past surgical history, and problem list.    Review of Systems   Respiratory:  Negative for shortness of breath.    Cardiovascular:  Negative for chest pain and leg swelling.   Gastrointestinal:         Screening colonoscopy referral made   Endocrine:        Recent thyroid test at [facility] normal   Musculoskeletal:  Positive for joint swelling, myalgias and neck pain.       Objective   Physical Exam  Vitals and nursing note reviewed.   Constitutional:       Appearance: Normal appearance. She is obese.   Eyes:      Extraocular Movements: Extraocular movements intact.      Pupils: Pupils are equal, round, and reactive to light.   Cardiovascular:      Rate and Rhythm: Normal rate and regular rhythm.   Pulmonary:      Effort: Pulmonary effort is normal.      Breath sounds: Normal breath sounds.   Abdominal:      General: Abdomen is flat.      Palpations: Abdomen is soft.   Musculoskeletal:      Cervical back: Rigidity present.      Right lower leg: No edema.      Left lower leg: No edema.   Skin:     General: Skin is warm and dry.   Neurological:      General: No focal deficit present.      Mental Status: She is alert and oriented to person, place, and time.   Psychiatric:         Mood and Affect: Mood normal.         Behavior: Behavior normal.         Thought Content: Thought content normal.         Judgment: Judgment normal.         Assessment & Plan   Diagnoses and all orders for this visit:    1. Fatigue, unspecified type (Primary)  -     CBC & Differential    2. Mixed hyperlipidemia  -     Comprehensive Metabolic Panel  -     Lipid Panel    3. Need for hepatitis C screening test  -      Hepatitis C Antibody    4. Synovitis  -     CK  -     Aldolase  -     C-reactive Protein  -     Sedimentation Rate  -     MADISON by IFA, Reflex 9-biomarkers profile  -     Rheumatoid Arthritis (RA) Profile    5. Myositis, unspecified myositis type, unspecified site  -     CK  -     Aldolase  -     C-reactive Protein  -     Sedimentation Rate    6. Encounter for screening colonoscopy  -     Ambulatory Referral to Gastroenterology         Plan above-patient wants to stop atorvastatin-DC 6 weeks-return fasting for CMP lipid CK aldolase         Answers submitted by the patient for this visit:  Other (Submitted on 1/31/2024)  Please describe your symptoms.: Get bloodwork done but talk about thyroid and hormones  Have you had these symptoms before?: No  How long have you been having these symptoms?: Greater than 2 weeks  Please list any medications you are currently taking for this condition.: None  Please describe any probable cause for these symptoms. : Hair loss, anxiety, weight gain  Primary Reason for Visit (Submitted on 1/31/2024)  What is the primary reason for your visit?: Other

## 2024-02-10 LAB
ALBUMIN SERPL-MCNC: 4.7 G/DL (ref 3.5–5.2)
ALBUMIN/GLOB SERPL: 2.9 G/DL
ALDOLASE SERPL-CCNC: 4 U/L (ref 3.3–10.3)
ALP SERPL-CCNC: 76 U/L (ref 39–117)
ALT SERPL-CCNC: 19 U/L (ref 1–33)
ANA SER QL IF: NEGATIVE
AST SERPL-CCNC: 21 U/L (ref 1–32)
BASOPHILS # BLD AUTO: 0.05 10*3/MM3 (ref 0–0.2)
BASOPHILS NFR BLD AUTO: 1.4 % (ref 0–1.5)
BILIRUB SERPL-MCNC: 0.8 MG/DL (ref 0–1.2)
BUN SERPL-MCNC: 9 MG/DL (ref 6–20)
BUN/CREAT SERPL: 10.3 (ref 7–25)
CALCIUM SERPL-MCNC: 9.7 MG/DL (ref 8.6–10.5)
CCP IGA+IGG SERPL IA-ACNC: 5 UNITS (ref 0–19)
CHLORIDE SERPL-SCNC: 106 MMOL/L (ref 98–107)
CHOLEST SERPL-MCNC: 165 MG/DL (ref 0–200)
CK SERPL-CCNC: 123 U/L (ref 20–180)
CO2 SERPL-SCNC: 26.8 MMOL/L (ref 22–29)
CREAT SERPL-MCNC: 0.87 MG/DL (ref 0.57–1)
CRP SERPL-MCNC: <0.3 MG/DL (ref 0–0.5)
EGFRCR SERPLBLD CKD-EPI 2021: 81.8 ML/MIN/1.73
EOSINOPHIL # BLD AUTO: 0.07 10*3/MM3 (ref 0–0.4)
EOSINOPHIL NFR BLD AUTO: 2 % (ref 0.3–6.2)
ERYTHROCYTE [DISTWIDTH] IN BLOOD BY AUTOMATED COUNT: 12.8 % (ref 12.3–15.4)
ERYTHROCYTE [SEDIMENTATION RATE] IN BLOOD BY WESTERGREN METHOD: 1 MM/HR (ref 0–20)
GLOBULIN SER CALC-MCNC: 1.6 GM/DL
GLUCOSE SERPL-MCNC: 90 MG/DL (ref 65–99)
HCT VFR BLD AUTO: 43.7 % (ref 34–46.6)
HCV IGG SERPL QL IA: NON REACTIVE
HDLC SERPL-MCNC: 40 MG/DL (ref 40–60)
HGB BLD-MCNC: 14.5 G/DL (ref 12–15.9)
IMM GRANULOCYTES # BLD AUTO: 0 10*3/MM3 (ref 0–0.05)
IMM GRANULOCYTES NFR BLD AUTO: 0 % (ref 0–0.5)
LABORATORY COMMENT REPORT: NORMAL
LDLC SERPL CALC-MCNC: 94 MG/DL (ref 0–100)
LYMPHOCYTES # BLD AUTO: 1.09 10*3/MM3 (ref 0.7–3.1)
LYMPHOCYTES NFR BLD AUTO: 30.7 % (ref 19.6–45.3)
MCH RBC QN AUTO: 29.4 PG (ref 26.6–33)
MCHC RBC AUTO-ENTMCNC: 33.2 G/DL (ref 31.5–35.7)
MCV RBC AUTO: 88.6 FL (ref 79–97)
MONOCYTES # BLD AUTO: 0.41 10*3/MM3 (ref 0.1–0.9)
MONOCYTES NFR BLD AUTO: 11.5 % (ref 5–12)
NEUTROPHILS # BLD AUTO: 1.93 10*3/MM3 (ref 1.7–7)
NEUTROPHILS NFR BLD AUTO: 54.4 % (ref 42.7–76)
NRBC BLD AUTO-RTO: 0 /100 WBC (ref 0–0.2)
PLATELET # BLD AUTO: 274 10*3/MM3 (ref 140–450)
POTASSIUM SERPL-SCNC: 4.4 MMOL/L (ref 3.5–5.2)
PROT SERPL-MCNC: 6.3 G/DL (ref 6–8.5)
RBC # BLD AUTO: 4.93 10*6/MM3 (ref 3.77–5.28)
RHEUMATOID FACT SERPL-ACNC: <10 IU/ML
SODIUM SERPL-SCNC: 144 MMOL/L (ref 136–145)
TRIGL SERPL-MCNC: 177 MG/DL (ref 0–150)
VLDLC SERPL CALC-MCNC: 31 MG/DL (ref 5–40)
WBC # BLD AUTO: 3.55 10*3/MM3 (ref 3.4–10.8)

## 2024-02-15 ENCOUNTER — OFFICE VISIT (OUTPATIENT)
Dept: GASTROENTEROLOGY | Facility: CLINIC | Age: 49
End: 2024-02-15
Payer: COMMERCIAL

## 2024-02-15 VITALS
BODY MASS INDEX: 30.7 KG/M2 | SYSTOLIC BLOOD PRESSURE: 110 MMHG | OXYGEN SATURATION: 98 % | DIASTOLIC BLOOD PRESSURE: 70 MMHG | TEMPERATURE: 97.7 F | HEIGHT: 66 IN | HEART RATE: 75 BPM | WEIGHT: 191 LBS

## 2024-02-15 DIAGNOSIS — Z12.11 ENCOUNTER FOR SCREENING FOR MALIGNANT NEOPLASM OF COLON: Primary | ICD-10-CM

## 2024-02-15 NOTE — H&P (VIEW-ONLY)
Primary Physician: Fermín Rosas MD    Chief Complaint   Patient presents with    Colon Cancer Screening     Pt presents today for evaluation for screening colonoscopy        Subjective     Екатерина Stovall is a 48 y.o. female.    HPI  Colonoscopy Screening  Patient here to set up initial screening colonoscopy.  No family history of colon cancer to report.  Patient denies any change in bowel habits.  No diarrhea, constipation, abdominal pain or rectal bleeding.    Past Medical History:   Diagnosis Date    Allergic 2011    Cats,horses,dogs, trees and seasonal allergies    Anxiety     Arrhythmia     COVID-19 07/2022    Hyperlipidemia     Migraine     Seasonal allergies        Past Surgical History:   Procedure Laterality Date    APPENDECTOMY      ARM LESION/CYST EXCISION Left 01/21/2022    Procedure: LEFT RING FINGER NAILBED LESION EXCISIONAL BIOPSY;  Surgeon: Harry Holt MD;  Location: [facility] OR;  Service: Plastics;  Laterality: Left;    FINGER MASS EXCISION      LAPAROSCOPIC SALPINGOOPHERECTOMY Bilateral     OOPHORECTOMY      TOTAL ABDOMINAL HYSTERECTOMY      RIVKA w/ BSO due to pain caused by varicose veins and heavy bleeding.    TUBAL ABDOMINAL LIGATION          Current Outpatient Medications:     escitalopram (LEXAPRO) 10 MG tablet, Take 1 tablet by mouth Daily., Disp: 90 tablet, Rfl: 3    estradiol (ESTRACE) 1 MG tablet, TAKE 1 TABLET BY MOUTH DAILY, Disp: 90 tablet, Rfl: 0    Inulin (Fiber Choice) 1.5 g chewable tablet, Chew 2 tablets Daily., Disp: , Rfl:     levocetirizine (XYZAL) 5 MG tablet, Take 1 tablet by mouth Every Evening., Disp: 90 tablet, Rfl: 1    Liraglutide (Saxenda) 18 MG/3ML injection pen, Inject 3 mg under the skin into the appropriate area as directed Daily., Disp: 15 mL, Rfl: 5    montelukast (SINGULAIR) 10 MG tablet, Take 1 tablet by mouth every night at bedtime., Disp: 90 tablet, Rfl: 3    Probiotic Product (Align) 4 MG capsule, Take 2 tablets by mouth Daily., Disp: , Rfl:      "atorvastatin (LIPITOR) 40 MG tablet, Take 1 tablet by mouth Every Night. (Patient not taking: Reported on 2/15/2024), Disp: 90 tablet, Rfl: 0    No Known Allergies    Social History     Socioeconomic History    Marital status:    Tobacco Use    Smoking status: Never    Smokeless tobacco: Never   Vaping Use    Vaping Use: Never used   Substance and Sexual Activity    Alcohol use: Not Currently     Comment: I very seldom have a glass of wine    Drug use: Never    Sexual activity: Yes     Partners: Male     Birth control/protection: Hysterectomy       Family History   Problem Relation Age of Onset    Ovarian cancer Mother     Arthritis Mother     Asthma Mother     Cancer Mother     Thyroid disease Mother     Heart disease Father     Hyperlipidemia Father     Diabetes Father     Hypertension Father     Uterine cancer Maternal Aunt     Breast cancer Maternal Aunt     Cancer Maternal Aunt     Cancer Maternal Aunt     Breast cancer Maternal Grandmother     Cancer Maternal Grandmother     Breast cancer Paternal Grandmother     Colon cancer Neg Hx     Melanoma Neg Hx     Colon polyps Neg Hx     Esophageal cancer Neg Hx     Liver cancer Neg Hx     Stomach cancer Neg Hx     Rectal cancer Neg Hx     Liver disease Neg Hx        Review of Systems   Constitutional:  Negative for unexpected weight change.   Respiratory:  Negative for shortness of breath.    Cardiovascular:  Negative for chest pain.       Objective     /70 (BP Location: Left arm, Patient Position: Sitting, Cuff Size: Adult)   Pulse 75   Temp 97.7 °F (36.5 °C) (Infrared)   Ht 167.6 cm (66\")   Wt 86.6 kg (191 lb)   SpO2 98%   Breastfeeding No   BMI 30.83 kg/m²     Physical Exam  Vitals reviewed.   Constitutional:       Appearance: Normal appearance.   Cardiovascular:      Rate and Rhythm: Normal rate and regular rhythm.      Heart sounds: Normal heart sounds.   Pulmonary:      Effort: Pulmonary effort is normal.   Neurological:      Mental Status: " She is alert.         Lab Results - Last 18 Months   Lab Units 02/07/24  0748 08/07/23  0825 04/13/23  0729 01/03/23  0808   GLUCOSE mg/dL 90 93 98 97   BUN mg/dL 9 11 14 12   CREATININE mg/dL 0.87 0.88 0.82 0.75   SODIUM mmol/L 144 146* 145 143   POTASSIUM mmol/L 4.4 4.7 4.3 4.5   CHLORIDE mmol/L 106 106 107 105   CO2 mmol/L 26.8 29.9* 28.9 30.6*   ALBUMIN g/dL 4.7 4.4 4.7 4.4   ALT (SGPT) U/L 19 28 29 44*   AST (SGOT) U/L 21 25 30 32   ALK PHOS U/L 76 75 64 73   BILIRUBIN mg/dL 0.8 0.4 0.8 0.8   SED RATE mm/hr 1  --   --   --        Lab Results - Last 18 Months   Lab Units 02/07/24  0748 08/07/23  0825   HEMOGLOBIN g/dL 14.5 14.4   HEMATOCRIT % 43.7 43.5   MCV fL 88.6 90.2   WBC 10*3/mm3 3.55 4.83   RDW % 12.8 12.9   PLATELETS 10*3/mm3 274 269         Lab Results - Last 18 Months   Lab Units 02/07/24  0748   MADISON  Negative           IMPRESSION/PLAN:    Assessment & Plan      Problem List Items Addressed This Visit       Encounter for screening for malignant neoplasm of colon - Primary    Relevant Orders    Case Request (Completed)     Colonoscopy per Dr Osbaldo Soliz    Pt instructed to hold Saxenda 1 week prior to procedure.        ..The risks, benefits, and alternatives of colonoscopy were reviewed with the patient today.  Risks including perforation of the colon possibly requiring surgery or colostomy.  Additional risks include risk of bleeding from biopsies or removal of colon tissue.  There is also the risk of a drug reaction or problems with anesthesia.  This will be discussed with the further by the anesthesia team on the day of the procedure.  Lastly there is a possibility of missing a colon polyp or cancer.  The benefits include the diagnosis and management of disease of the colon and rectum.  Alternatives to colonoscopy include barium enema, laboratory testing, radiographic evaluation, or no intervention.  The patient verbalizes understanding and agrees.    In accordance with requirements under the  Affordable Care Act, [facility] has provided pricing for all hospital services and items on each of its websites. However, a patient's actual cost may differ based on the services the patient receives to meet individual healthcare needs and based on the benefits provided under the patient’s insurance coverage.        Samaria Glass, APRN  02/15/24  10:29 CST    Part of this note may be an electronic transcription/translation of spoken language to printed text.

## 2024-03-13 ENCOUNTER — ANESTHESIA EVENT (OUTPATIENT)
Dept: GASTROENTEROLOGY | Facility: HOSPITAL | Age: 49
End: 2024-03-13
Payer: COMMERCIAL

## 2024-03-13 ENCOUNTER — HOSPITAL ENCOUNTER (OUTPATIENT)
Facility: HOSPITAL | Age: 49
Setting detail: HOSPITAL OUTPATIENT SURGERY
Discharge: HOME | End: 2024-03-13
Attending: INTERNAL MEDICINE | Admitting: INTERNAL MEDICINE
Payer: COMMERCIAL

## 2024-03-13 ENCOUNTER — ANESTHESIA (OUTPATIENT)
Dept: GASTROENTEROLOGY | Facility: HOSPITAL | Age: 49
End: 2024-03-13
Payer: COMMERCIAL

## 2024-03-13 VITALS
HEIGHT: 66 IN | RESPIRATION RATE: 14 BRPM | SYSTOLIC BLOOD PRESSURE: 114 MMHG | WEIGHT: 189 LBS | OXYGEN SATURATION: 98 % | TEMPERATURE: 97 F | BODY MASS INDEX: 30.37 KG/M2 | HEART RATE: 57 BPM | DIASTOLIC BLOOD PRESSURE: 64 MMHG

## 2024-03-13 DIAGNOSIS — Z12.11 ENCOUNTER FOR SCREENING FOR MALIGNANT NEOPLASM OF COLON: ICD-10-CM

## 2024-03-13 PROCEDURE — 88305 TISSUE EXAM BY PATHOLOGIST: CPT | Performed by: INTERNAL MEDICINE

## 2024-03-13 PROCEDURE — 25810000003 SODIUM CHLORIDE 0.9 % SOLUTION

## 2024-03-13 PROCEDURE — 25010000002 PROPOFOL 10 MG/ML EMULSION

## 2024-03-13 PROCEDURE — 45385 COLONOSCOPY W/LESION REMOVAL: CPT | Performed by: INTERNAL MEDICINE

## 2024-03-13 RX ORDER — PROPOFOL 10 MG/ML
VIAL (ML) INTRAVENOUS AS NEEDED
Status: DISCONTINUED | OUTPATIENT
Start: 2024-03-13 | End: 2024-03-13 | Stop reason: SURG

## 2024-03-13 RX ORDER — LIDOCAINE HYDROCHLORIDE 20 MG/ML
INJECTION, SOLUTION EPIDURAL; INFILTRATION; INTRACAUDAL; PERINEURAL AS NEEDED
Status: DISCONTINUED | OUTPATIENT
Start: 2024-03-13 | End: 2024-03-13 | Stop reason: SURG

## 2024-03-13 RX ORDER — SODIUM CHLORIDE 9 MG/ML
500 INJECTION, SOLUTION INTRAVENOUS CONTINUOUS PRN
Status: DISCONTINUED | OUTPATIENT
Start: 2024-03-13 | End: 2024-03-13 | Stop reason: HOSPADM

## 2024-03-13 RX ORDER — SODIUM CHLORIDE 0.9 % (FLUSH) 0.9 %
10 SYRINGE (ML) INJECTION AS NEEDED
Status: DISCONTINUED | OUTPATIENT
Start: 2024-03-13 | End: 2024-03-13 | Stop reason: HOSPADM

## 2024-03-13 RX ADMIN — SODIUM CHLORIDE 500 ML: 9 INJECTION, SOLUTION INTRAVENOUS at 07:19

## 2024-03-13 RX ADMIN — PROPOFOL INJECTABLE EMULSION 550 MG: 10 INJECTION, EMULSION INTRAVENOUS at 07:37

## 2024-03-13 RX ADMIN — SODIUM CHLORIDE: 9 INJECTION, SOLUTION INTRAVENOUS at 07:58

## 2024-03-13 RX ADMIN — LIDOCAINE HYDROCHLORIDE 50 MG: 20 INJECTION, SOLUTION EPIDURAL; INFILTRATION; INTRACAUDAL; PERINEURAL at 07:37

## 2024-03-13 NOTE — ANESTHESIA PREPROCEDURE EVALUATION
Anesthesia Evaluation     Patient summary reviewed   no history of anesthetic complications:   NPO Solid Status: > 8 hours             Airway   Mallampati: II  TM distance: >3 FB  No difficulty expected  Dental - normal exam     Pulmonary - negative pulmonary ROS   Cardiovascular - negative cardio ROS    (+) hyperlipidemia      Neuro/Psych- negative ROS  (+) psychiatric history Anxiety  GI/Hepatic/Renal/Endo - negative ROS     Musculoskeletal     Abdominal    Substance History      OB/GYN          Other                      Anesthesia Plan    ASA 1     MAC       Anesthetic plan, risks, benefits, and alternatives have been provided, discussed and informed consent has been obtained with: patient.    CODE STATUS:

## 2024-03-13 NOTE — ANESTHESIA POSTPROCEDURE EVALUATION
Patient: Екатерина Stovall    Procedure Summary       Date: 03/13/24 Room / Location: [facility] ENDOSCOPY 5 / [facility] ENDOSCOPY    Anesthesia Start: 0736 Anesthesia Stop: 0807    Procedure: COLONOSCOPY WITH ANESTHESIA Diagnosis:       Encounter for screening for malignant neoplasm of colon      (Encounter for screening for malignant neoplasm of colon [Z12.11])    Surgeons: Tara Roblero MD Provider: Porfirio Coronado CRNA    Anesthesia Type: MAC ASA Status: 1            Anesthesia Type: MAC    Vitals  Vitals Value Taken Time   BP     Temp     Pulse 75 03/13/24 0807   Resp     SpO2 90 % 03/13/24 0807   Vitals shown include unfiled device data.        Post Anesthesia Care and Evaluation    Patient location during evaluation: PHASE II  Patient participation: complete - patient participated  Level of consciousness: awake and alert  Pain score: 0    Airway patency: patent  Anesthetic complications: No anesthetic complications  PONV Status: none  Cardiovascular status: acceptable  Respiratory status: acceptable  Hydration status: acceptable  No anesthesia care post op

## 2024-03-15 PROBLEM — Z86.010 HISTORY OF ADENOMATOUS POLYP OF COLON: Status: ACTIVE | Noted: 2024-02-15

## 2024-03-15 PROBLEM — Z86.0101 HISTORY OF ADENOMATOUS POLYP OF COLON: Status: ACTIVE | Noted: 2024-02-15

## 2024-04-05 ENCOUNTER — OFFICE VISIT (OUTPATIENT)
Dept: FAMILY MEDICINE CLINIC | Facility: CLINIC | Age: 49
End: 2024-04-05
Payer: COMMERCIAL

## 2024-04-05 VITALS
TEMPERATURE: 97.5 F | RESPIRATION RATE: 16 BRPM | HEART RATE: 62 BPM | SYSTOLIC BLOOD PRESSURE: 105 MMHG | HEIGHT: 66 IN | OXYGEN SATURATION: 98 % | DIASTOLIC BLOOD PRESSURE: 69 MMHG | WEIGHT: 186 LBS | BODY MASS INDEX: 29.89 KG/M2

## 2024-04-05 DIAGNOSIS — M60.9 MYOSITIS, UNSPECIFIED MYOSITIS TYPE, UNSPECIFIED SITE: Primary | ICD-10-CM

## 2024-04-05 PROCEDURE — 99213 OFFICE O/P EST LOW 20 MIN: CPT | Performed by: NURSE PRACTITIONER

## 2024-04-05 NOTE — PROGRESS NOTES
CC: 6 week recheck - myositis    History:  Екатерина Stovall is a 48 y.o. female who presents today for evaluation of the above problems.      Patient reports that she is doing well. She denies any issues. Is here for recheck on myalgia/arthralgia. States that all of her pain  resolved after she stopped atorvastatin.     HPI  ROS:  Review of Systems   Musculoskeletal:  Negative for arthralgias and myalgias.       No Known Allergies  Past Medical History:   Diagnosis Date    Allergic 2011    Cats,horses,dogs, trees and seasonal allergies    Anxiety     Arrhythmia     COVID-19 07/2022    Hx of adenomatous polyp of colon 2024    Hyperlipidemia     Migraine     Seasonal allergies      Past Surgical History:   Procedure Laterality Date    APPENDECTOMY      ARM LESION/CYST EXCISION Left 01/21/2022    Procedure: LEFT RING FINGER NAILBED LESION EXCISIONAL BIOPSY;  Surgeon: Harry Holt MD;  Location: [facility] OR;  Service: Plastics;  Laterality: Left;    COLONOSCOPY      COLONOSCOPY N/A 3/13/2024    Procedure: COLONOSCOPY WITH ANESTHESIA;  Surgeon: Tara Roblero MD;  Location: [facility] ENDOSCOPY;  Service: Gastroenterology;  Laterality: N/A;  pre: screen  post:  polyp  settle    FINGER MASS EXCISION      LAPAROSCOPIC SALPINGOOPHERECTOMY Bilateral     OOPHORECTOMY      TOTAL ABDOMINAL HYSTERECTOMY      RIVKA w/ BSO due to pain caused by varicose veins and heavy bleeding.    TUBAL ABDOMINAL LIGATION       Family History   Problem Relation Age of Onset    Ovarian cancer Mother     Arthritis Mother     Asthma Mother     Cancer Mother     Thyroid disease Mother     Heart disease Father     Hyperlipidemia Father     Diabetes Father     Hypertension Father     Uterine cancer Maternal Aunt     Breast cancer Maternal Aunt     Cancer Maternal Aunt     Cancer Maternal Aunt     Breast cancer Maternal Grandmother     Cancer Maternal Grandmother     Breast cancer Paternal Grandmother     Colon cancer Neg Hx     Melanoma Neg Hx     Colon  "polyps Neg Hx     Esophageal cancer Neg Hx     Liver cancer Neg Hx     Stomach cancer Neg Hx     Rectal cancer Neg Hx     Liver disease Neg Hx       reports that she has never smoked. She has never used smokeless tobacco. She reports that she does not currently use alcohol. She reports that she does not use drugs.      Current Outpatient Medications:     escitalopram (LEXAPRO) 10 MG tablet, Take 1 tablet by mouth Daily., Disp: 90 tablet, Rfl: 3    estradiol (ESTRACE) 1 MG tablet, TAKE 1 TABLET BY MOUTH DAILY, Disp: 90 tablet, Rfl: 0    Inulin (Fiber Choice) 1.5 g chewable tablet, Chew 2 tablets Daily., Disp: , Rfl:     levocetirizine (XYZAL) 5 MG tablet, Take 1 tablet by mouth Every Evening., Disp: 90 tablet, Rfl: 1    Liraglutide (Saxenda) 18 MG/3ML injection pen, Inject 3 mg under the skin into the appropriate area as directed Daily., Disp: 15 mL, Rfl: 5    montelukast (SINGULAIR) 10 MG tablet, Take 1 tablet by mouth every night at bedtime., Disp: 90 tablet, Rfl: 3    Probiotic Product (Align) 4 MG capsule, Take 2 tablets by mouth Daily., Disp: , Rfl:     OBJECTIVE:  /69 (BP Location: Right arm, Patient Position: Sitting, Cuff Size: Adult)   Pulse 62   Temp 97.5 °F (36.4 °C) (Temporal)   Resp 16   Ht 167.6 cm (66\")   Wt 84.4 kg (186 lb)   SpO2 98%   BMI 30.02 kg/m²    Physical Exam  Vitals reviewed.   Constitutional:       Appearance: She is well-developed.   Cardiovascular:      Rate and Rhythm: Normal rate and regular rhythm.      Heart sounds: Normal heart sounds.   Pulmonary:      Effort: Pulmonary effort is normal.      Breath sounds: Normal breath sounds.   Neurological:      Mental Status: She is alert and oriented to person, place, and time.   Psychiatric:         Behavior: Behavior normal.         Assessment/Plan    Diagnoses and all orders for this visit:    1. Myositis, unspecified myositis type, unspecified site (Primary)  -     Comprehensive Metabolic Panel  -     Lipid Panel  -     CK  - "     Aldolase    Recheck labs today. Continue to follow lipids.             An After Visit Summary was printed and given to the patient at discharge.  Return in about 3 months (around 7/5/2024), or if symptoms worsen or fail to improve, for Annual physical.       BALDO Delgado 4/5/24    Electronically signed.

## 2024-04-08 LAB
ALBUMIN SERPL-MCNC: 4.7 G/DL (ref 3.5–5.2)
ALBUMIN/GLOB SERPL: 2.8 G/DL
ALDOLASE SERPL-CCNC: 4.9 U/L (ref 3.3–10.3)
ALP SERPL-CCNC: 70 U/L (ref 39–117)
ALT SERPL-CCNC: 20 U/L (ref 1–33)
AST SERPL-CCNC: 19 U/L (ref 1–32)
BILIRUB SERPL-MCNC: 0.7 MG/DL (ref 0–1.2)
BUN SERPL-MCNC: 10 MG/DL (ref 6–20)
BUN/CREAT SERPL: 12 (ref 7–25)
CALCIUM SERPL-MCNC: 10 MG/DL (ref 8.6–10.5)
CHLORIDE SERPL-SCNC: 104 MMOL/L (ref 98–107)
CHOLEST SERPL-MCNC: 203 MG/DL (ref 0–200)
CK SERPL-CCNC: 63 U/L (ref 20–180)
CO2 SERPL-SCNC: 29.3 MMOL/L (ref 22–29)
CREAT SERPL-MCNC: 0.83 MG/DL (ref 0.57–1)
EGFRCR SERPLBLD CKD-EPI 2021: 86.5 ML/MIN/1.73
GLOBULIN SER CALC-MCNC: 1.7 GM/DL
GLUCOSE SERPL-MCNC: 94 MG/DL (ref 65–99)
HDLC SERPL-MCNC: 41 MG/DL (ref 40–60)
LDLC SERPL CALC-MCNC: 141 MG/DL (ref 0–100)
POTASSIUM SERPL-SCNC: 4.3 MMOL/L (ref 3.5–5.2)
PROT SERPL-MCNC: 6.4 G/DL (ref 6–8.5)
SODIUM SERPL-SCNC: 142 MMOL/L (ref 136–145)
TRIGL SERPL-MCNC: 116 MG/DL (ref 0–150)
VLDLC SERPL CALC-MCNC: 21 MG/DL (ref 5–40)

## 2024-04-18 ENCOUNTER — PATIENT MESSAGE (OUTPATIENT)
Dept: OBSTETRICS AND GYNECOLOGY | Age: 49
End: 2024-04-18
Payer: COMMERCIAL

## 2024-04-18 DIAGNOSIS — E66.9 CLASS 1 OBESITY WITH BODY MASS INDEX (BMI) OF 31.0 TO 31.9 IN ADULT, UNSPECIFIED OBESITY TYPE, UNSPECIFIED WHETHER SERIOUS COMORBIDITY PRESENT: Primary | ICD-10-CM

## 2024-04-25 NOTE — TELEPHONE ENCOUNTER
From: Екатерина Stoavll  To: Lauren Lincoln  Sent: 4/18/2024 8:42 PM CDT  Subject: Prescription     I have tried finding saxsenda and been unsuccessful our local pharmacy is offering the below and I was wonder if you would write a prescription for it?    Semaglutide 2.5mg/mL compund Sterile injection     Inject 0.1 mL subQ once weekly for 4 weeks, then increase to injection 0.2 mL subQ weekly there after.

## 2024-06-14 RX ORDER — MONTELUKAST SODIUM 10 MG/1
10 TABLET ORAL
Qty: 90 TABLET | Refills: 3 | Status: SHIPPED | OUTPATIENT
Start: 2024-06-14

## 2024-06-14 RX ORDER — ESCITALOPRAM OXALATE 10 MG/1
10 TABLET ORAL DAILY
Qty: 90 TABLET | Refills: 3 | Status: SHIPPED | OUTPATIENT
Start: 2024-06-14

## 2024-06-14 NOTE — TELEPHONE ENCOUNTER
Rx Refill Note  Requested Prescriptions     Pending Prescriptions Disp Refills    montelukast (SINGULAIR) 10 MG tablet [Pharmacy Med Name: montelukast 10 mg tablet] 90 tablet 3     Sig: Take 1 tablet by mouth every night at bedtime.    escitalopram (LEXAPRO) 10 MG tablet [Pharmacy Med Name: escitalopram 10 mg tablet] 90 tablet 3     Sig: Take 1 tablet by mouth Daily.      Last office visit with prescribing clinician: 4/5/24   Last telemedicine visit with prescribing clinician: Visit date not found   Next office visit with prescribing clinician:         Deisi Hilton MA  06/14/24, 15:48 CDT

## 2024-07-01 ENCOUNTER — OFFICE VISIT (OUTPATIENT)
Dept: FAMILY MEDICINE CLINIC | Facility: CLINIC | Age: 49
End: 2024-07-01
Payer: COMMERCIAL

## 2024-07-01 VITALS
DIASTOLIC BLOOD PRESSURE: 71 MMHG | HEART RATE: 68 BPM | TEMPERATURE: 97.8 F | OXYGEN SATURATION: 98 % | HEIGHT: 66 IN | SYSTOLIC BLOOD PRESSURE: 110 MMHG | BODY MASS INDEX: 28.67 KG/M2 | WEIGHT: 178.4 LBS

## 2024-07-01 DIAGNOSIS — R05.1 ACUTE COUGH: ICD-10-CM

## 2024-07-01 DIAGNOSIS — U07.1 COVID-19: Primary | ICD-10-CM

## 2024-07-01 LAB
EXPIRATION DATE: ABNORMAL
FLUAV AG UPPER RESP QL IA.RAPID: NOT DETECTED
FLUBV AG UPPER RESP QL IA.RAPID: NOT DETECTED
INTERNAL CONTROL: ABNORMAL
Lab: ABNORMAL
SARS-COV-2 AG UPPER RESP QL IA.RAPID: DETECTED

## 2024-07-01 PROCEDURE — 99213 OFFICE O/P EST LOW 20 MIN: CPT | Performed by: NURSE PRACTITIONER

## 2024-07-01 PROCEDURE — 87428 SARSCOV & INF VIR A&B AG IA: CPT | Performed by: NURSE PRACTITIONER

## 2024-07-01 RX ORDER — DEXTROMETHORPHAN HYDROBROMIDE AND PROMETHAZINE HYDROCHLORIDE 15; 6.25 MG/5ML; MG/5ML
5 SYRUP ORAL 4 TIMES DAILY PRN
Qty: 180 ML | Refills: 0 | Status: SHIPPED | OUTPATIENT
Start: 2024-07-01

## 2024-07-01 NOTE — PROGRESS NOTES
CC: URI    History:  Екатерина Stovall is a 48 y.o. female who presents today for evaluation of the above problems.        Cough  This is a new problem. The current episode started in the past 7 days. The problem has been comes and goes. The cough is Productive of green sputum. Associated symptoms include chills, headaches and myalgias. Pertinent negatives include no sore throat.     ROS:  Review of Systems   Constitutional:  Positive for chills.   HENT:  Negative for sore throat.    Respiratory:  Positive for cough.    Musculoskeletal:  Positive for myalgias.   Neurological:  Positive for headaches.       No Known Allergies  Past Medical History:   Diagnosis Date    Allergic 2011    Cats,horses,dogs, trees and seasonal allergies    Anxiety     Arrhythmia     COVID-19 07/2022    Hx of adenomatous polyp of colon 2024    Hyperlipidemia     Migraine     Seasonal allergies      Past Surgical History:   Procedure Laterality Date    APPENDECTOMY      ARM LESION/CYST EXCISION Left 01/21/2022    Procedure: LEFT RING FINGER NAILBED LESION EXCISIONAL BIOPSY;  Surgeon: Harry Holt MD;  Location: [facility] OR;  Service: Plastics;  Laterality: Left;    COLONOSCOPY      COLONOSCOPY N/A 3/13/2024    Procedure: COLONOSCOPY WITH ANESTHESIA;  Surgeon: Tara Roblero MD;  Location: [facility] ENDOSCOPY;  Service: Gastroenterology;  Laterality: N/A;  pre: screen  post:  polyp  settle    FINGER MASS EXCISION      LAPAROSCOPIC SALPINGOOPHERECTOMY Bilateral     OOPHORECTOMY      TOTAL ABDOMINAL HYSTERECTOMY      RIVKA w/ BSO due to pain caused by varicose veins and heavy bleeding.    TUBAL ABDOMINAL LIGATION       Family History   Problem Relation Age of Onset    Ovarian cancer Mother     Arthritis Mother     Asthma Mother     Cancer Mother     Thyroid disease Mother     COPD Mother     Depression Mother     Heart disease Father     Hyperlipidemia Father     Diabetes Father     Hypertension Father     Uterine cancer Maternal Aunt     Breast  "cancer Maternal Aunt     Cancer Maternal Aunt     Cancer Maternal Aunt     Breast cancer Maternal Grandmother     Cancer Maternal Grandmother     Breast cancer Paternal Grandmother     Colon cancer Neg Hx     Melanoma Neg Hx     Colon polyps Neg Hx     Esophageal cancer Neg Hx     Liver cancer Neg Hx     Stomach cancer Neg Hx     Rectal cancer Neg Hx     Liver disease Neg Hx       reports that she has never smoked. She has been exposed to tobacco smoke. She has never used smokeless tobacco. She reports that she does not currently use alcohol. She reports that she does not use drugs.      Current Outpatient Medications:     Black Cohosh-SoyIsoflav-C Quad 40- MG capsule, , Disp: , Rfl:     escitalopram (LEXAPRO) 10 MG tablet, Take 1 tablet by mouth Daily., Disp: 90 tablet, Rfl: 3    Inulin (Fiber Choice) 1.5 g chewable tablet, Chew 2 tablets Daily., Disp: , Rfl:     levocetirizine (XYZAL) 5 MG tablet, Take 1 tablet by mouth Every Evening., Disp: 90 tablet, Rfl: 1    Liraglutide (Saxenda) 18 MG/3ML injection pen, Inject 3 mg under the skin into the appropriate area as directed Daily., Disp: 15 mL, Rfl: 5    montelukast (SINGULAIR) 10 MG tablet, Take 1 tablet by mouth every night at bedtime., Disp: 90 tablet, Rfl: 3    NON FORMULARY, Semaglutide 2.5mg/mL compund Sterile injection. Inject 0.1 mL subQ once weekly for 4 weeks, then increase to injection 0.2 mL subQ weekly there after., Disp: 4 each, Rfl: 0    Probiotic Product (Align) 4 MG capsule, Take 2 tablets by mouth Daily., Disp: , Rfl:     promethazine-dextromethorphan (PROMETHAZINE-DM) 6.25-15 MG/5ML syrup, Take 5 mL by mouth 4 (Four) Times a Day As Needed for Cough., Disp: 180 mL, Rfl: 0    OBJECTIVE:  /71 (BP Location: Left arm, Patient Position: Sitting, Cuff Size: Adult)   Pulse 68   Temp 97.8 °F (36.6 °C) (Temporal)   Ht 167.6 cm (66\")   Wt 80.9 kg (178 lb 6.4 oz)   SpO2 98%   BMI 28.79 kg/m²    Physical Exam  Vitals reviewed. "   Constitutional:       Appearance: She is well-developed.   HENT:      Mouth/Throat:      Pharynx: Posterior oropharyngeal erythema present.   Cardiovascular:      Rate and Rhythm: Normal rate and regular rhythm.      Heart sounds: Normal heart sounds.   Pulmonary:      Effort: Pulmonary effort is normal.      Breath sounds: Normal breath sounds.   Neurological:      Mental Status: She is alert and oriented to person, place, and time.   Psychiatric:         Behavior: Behavior normal.         Assessment/Plan    Diagnoses and all orders for this visit:    1. COVID-19 (Primary)  -     promethazine-dextromethorphan (PROMETHAZINE-DM) 6.25-15 MG/5ML syrup; Take 5 mL by mouth 4 (Four) Times a Day As Needed for Cough.  Dispense: 180 mL; Refill: 0    2. Acute cough  -     POCT SARS-CoV-2 Antigen CINDY + Flu    Swab positive for covid 19. Isolation protocol discussed. Symptomatic treatment discussed.     An After Visit Summary was printed and given to the patient at discharge.  Return if symptoms worsen or fail to improve, for Next scheduled follow up.       BALDO Delgado 7/1/24    Electronically signed.

## 2024-07-17 ENCOUNTER — TELEPHONE (OUTPATIENT)
Dept: OBSTETRICS AND GYNECOLOGY | Age: 49
End: 2024-07-17
Payer: COMMERCIAL

## 2024-07-17 ENCOUNTER — TRANSCRIBE ORDERS (OUTPATIENT)
Dept: ADMINISTRATIVE | Facility: HOSPITAL | Age: 49
End: 2024-07-17
Payer: COMMERCIAL

## 2024-07-17 DIAGNOSIS — Z12.31 ENCOUNTER FOR SCREENING MAMMOGRAM FOR MALIGNANT NEOPLASM OF BREAST: Primary | ICD-10-CM

## 2024-07-17 NOTE — TELEPHONE ENCOUNTER
Caller: Екатерина Stovall    Relationship: Self    Best call back number: [phone]    What orders are you requesting (i.e. lab or imaging): MAMMOGRAM    In what timeframe would the patient need to come in: ASAP - HAS ANNUAL ON 7/29    Where will you [facility] your lab[facility]imaging [facility]    Additional notes: PT IS ASKING FOR AN ORDER TO BE SENT OVER FOR HER SO SHE CAN GET HER MAMMOGRAM DONE

## 2024-07-22 DIAGNOSIS — J30.2 SEASONAL ALLERGIES: Primary | ICD-10-CM

## 2024-07-22 RX ORDER — LEVOCETIRIZINE DIHYDROCHLORIDE 5 MG/1
5 TABLET, FILM COATED ORAL EVERY EVENING
Qty: 30 TABLET | Refills: 0 | Status: SHIPPED | OUTPATIENT
Start: 2024-07-22

## 2024-07-22 NOTE — TELEPHONE ENCOUNTER
Rx Refill Note  Requested Prescriptions     Pending Prescriptions Disp Refills    levocetirizine (XYZAL) 5 MG tablet [Pharmacy Med Name: levocetirizine 5 mg tablet] 90 tablet 1     Sig: Take 1 tablet by mouth Every Evening.      Last office visit with prescribing clinician: 2/7/2024   Last telemedicine visit with prescribing clinician: Visit date not found   Next office visit with prescribing clinician: Visit date not found   CPE done 6/27/2022    {TIP  Please add Last Relevant Lab Date if appropriate:              {TIP  Is Refill Pharmacy correct?:Yes     Would you like a call back once the refill request has been completed: [] Yes [] No    If the office needs to give you a call back, can they leave a voicemail: [] Yes [] No    Deisi Piedra MA  07/22/24, 08:35 CDT

## 2024-07-26 LAB
NCCN CRITERIA FLAG: ABNORMAL
TYRER CUZICK SCORE: 19.3

## 2024-07-26 NOTE — PROGRESS NOTES
This patient recently took the CARE risk assessment for a mammogram appointment. Based on the patient's responses, NCCN criteria for genetic testing was met.     Navigator follow-up: The patient had CARE testing in 2021, additional testing is not indicated at this time.

## 2024-07-29 ENCOUNTER — OFFICE VISIT (OUTPATIENT)
Dept: OBSTETRICS AND GYNECOLOGY | Age: 49
End: 2024-07-29
Payer: COMMERCIAL

## 2024-07-29 ENCOUNTER — HOSPITAL ENCOUNTER (OUTPATIENT)
Dept: MAMMOGRAPHY | Facility: HOSPITAL | Age: 49
Discharge: HOME | End: 2024-07-29
Admitting: NURSE PRACTITIONER
Payer: COMMERCIAL

## 2024-07-29 VITALS
BODY MASS INDEX: 28.77 KG/M2 | SYSTOLIC BLOOD PRESSURE: 130 MMHG | DIASTOLIC BLOOD PRESSURE: 68 MMHG | WEIGHT: 179 LBS | HEIGHT: 66 IN

## 2024-07-29 DIAGNOSIS — E66.3 OVERWEIGHT: ICD-10-CM

## 2024-07-29 DIAGNOSIS — Z01.419 WELL WOMAN EXAM WITH ROUTINE GYNECOLOGICAL EXAM: Primary | ICD-10-CM

## 2024-07-29 DIAGNOSIS — Z12.31 ENCOUNTER FOR SCREENING MAMMOGRAM FOR MALIGNANT NEOPLASM OF BREAST: ICD-10-CM

## 2024-07-29 PROCEDURE — 77067 SCR MAMMO BI INCL CAD: CPT

## 2024-07-29 PROCEDURE — 77063 BREAST TOMOSYNTHESIS BI: CPT

## 2024-07-29 PROCEDURE — 99396 PREV VISIT EST AGE 40-64: CPT | Performed by: NURSE PRACTITIONER

## 2024-07-29 PROCEDURE — 99213 OFFICE O/P EST LOW 20 MIN: CPT | Performed by: NURSE PRACTITIONER

## 2024-07-29 RX ORDER — LIRAGLUTIDE 6 MG/ML
0.6 INJECTION, SOLUTION SUBCUTANEOUS DAILY
Qty: 30 ML | Refills: 2 | Status: SHIPPED | OUTPATIENT
Start: 2024-07-29

## 2024-07-29 NOTE — PROGRESS NOTES
Subjective   Екатерина Stovall is a 48 y.o. female  YOB: 1975        Chief Complaint   Patient presents with    Gynecologic Exam     Pt here for annual GYN exam. Last mamm 7/26/2023, negative. Pt has mammogram today. Pt wants to talk about Saxenda.        Gynecologic Exam  The patient's pertinent negatives include no pelvic pain. Pertinent negatives include no abdominal pain, back pain, constipation, diarrhea, dysuria, fever, frequency, hematuria, nausea, rash, sore throat, urgency or vomiting.       The following portions of the patient's history were reviewed and updated as appropriate: allergies, current medications, past family history, past medical history, past social history, past surgical history, and problem list.    No Known Allergies    Past Medical History:   Diagnosis Date    Allergic 2011    Cats,horses,dogs, trees and seasonal allergies    Anxiety     Arrhythmia     COVID-19 07/2022    Hx of adenomatous polyp of colon 2024    Hyperlipidemia     Migraine     Seasonal allergies        Family History   Problem Relation Age of Onset    Ovarian cancer Mother     Arthritis Mother     Asthma Mother     Cancer Mother     Thyroid disease Mother     COPD Mother     Depression Mother     Heart disease Father     Hyperlipidemia Father     Diabetes Father     Hypertension Father     Uterine cancer Maternal Aunt     Breast cancer Maternal Aunt     Cancer Maternal Aunt     Cancer Maternal Aunt     Breast cancer Maternal Grandmother     Cancer Maternal Grandmother     Breast cancer Paternal Grandmother     Colon cancer Neg Hx     Melanoma Neg Hx     Colon polyps Neg Hx     Esophageal cancer Neg Hx     Liver cancer Neg Hx     Stomach cancer Neg Hx     Rectal cancer Neg Hx     Liver disease Neg Hx        Social History     Socioeconomic History    Marital status:    Tobacco Use    Smoking status: Never     Passive exposure: Past    Smokeless tobacco: Never   Vaping Use    Vaping status: Never  Used   Substance and Sexual Activity    Alcohol use: Not Currently     Comment: I very seldom have a glass of wine    Drug use: Never    Sexual activity: Yes     Partners: Male     Birth control/protection: Hysterectomy     Comment:          Current Outpatient Medications:     Black Cohosh-SoyIsoflav-C Quad 40- MG capsule, , Disp: , Rfl:     escitalopram (LEXAPRO) 10 MG tablet, Take 1 tablet by mouth Daily., Disp: 90 tablet, Rfl: 3    Inulin (Fiber Choice) 1.5 g chewable tablet, Chew 2 tablets Daily., Disp: , Rfl:     levocetirizine (XYZAL) 5 MG tablet, Take 1 tablet by mouth Every Evening., Disp: 30 tablet, Rfl: 0    montelukast (SINGULAIR) 10 MG tablet, Take 1 tablet by mouth every night at bedtime., Disp: 90 tablet, Rfl: 3    NON FORMULARY, Semaglutide 2.5mg/mL compund Sterile injection. Inject 0.1 mL subQ once weekly for 4 weeks, then increase to injection 0.2 mL subQ weekly there after., Disp: 4 each, Rfl: 0    Probiotic Product (Align) 4 MG capsule, Take 2 tablets by mouth Daily., Disp: , Rfl:     Rhubarb (ESTROVEN COMPLETE PO), Take  by mouth., Disp: , Rfl:     Liraglutide (Saxenda) 18 MG/3ML injection pen, Inject 0.6 mg under the skin into the appropriate area as directed Daily., Disp: 30 mL, Rfl: 2    No LMP recorded. Patient has had a hysterectomy.    Sexual History:           Could not be calculated    Past Surgical History:   Procedure Laterality Date    APPENDECTOMY      ARM LESION/CYST EXCISION Left 01/21/2022    Procedure: LEFT RING FINGER NAILBED LESION EXCISIONAL BIOPSY;  Surgeon: Harry Holt MD;  Location: [facility] OR;  Service: Plastics;  Laterality: Left;    COLONOSCOPY      COLONOSCOPY N/A 3/13/2024    Procedure: COLONOSCOPY WITH ANESTHESIA;  Surgeon: Tara Roblero MD;  Location: [facility] ENDOSCOPY;  Service: Gastroenterology;  Laterality: N/A;  pre: screen  post:  polyp  settle    FINGER MASS EXCISION      LAPAROSCOPIC SALPINGOOPHERECTOMY Bilateral     OOPHORECTOMY      TOTAL  ABDOMINAL HYSTERECTOMY      RIVKA w/ BSO due to pain caused by varicose veins and heavy bleeding.    TUBAL ABDOMINAL LIGATION         Review of Systems   Constitutional:  Negative for activity change, appetite change, fatigue, fever, unexpected weight gain and unexpected weight loss.   HENT:  Negative for congestion, ear pain, hearing loss, nosebleeds, rhinorrhea, sore throat, tinnitus and trouble swallowing.    Eyes:  Negative for blurred vision, pain, discharge, itching and visual disturbance.   Respiratory:  Negative for apnea, chest tightness, shortness of breath and wheezing.    Cardiovascular:  Negative for chest pain and leg swelling.   Gastrointestinal:  Negative for abdominal pain, blood in stool, constipation, diarrhea, nausea, vomiting and GERD.   Endocrine: Negative for heat intolerance, polydipsia and polyuria.   Genitourinary: Negative.  Negative for breast lump, decreased libido, difficulty urinating, dyspareunia, dysuria, frequency, genital sores, hematuria, menstrual problem, pelvic pain, urgency, urinary incontinence and vaginal pain.   Musculoskeletal:  Negative for arthralgias, back pain, joint swelling and myalgias.   Skin:  Negative for color change, rash and skin lesions.   Allergic/Immunologic: Negative for environmental allergies, food allergies and immunocompromised state.   Neurological:  Negative for dizziness, tremors, seizures, syncope, facial asymmetry, numbness and headache.   Hematological:  Negative for adenopathy. Does not bruise/bleed easily.   Psychiatric/Behavioral:  Negative for agitation, hallucinations, sleep disturbance, suicidal ideas and depressed mood. The patient is not nervous/anxious.        Objective   Physical Exam  Vitals reviewed.   Constitutional:       General: She is not in acute distress.     Appearance: She is well-developed. She is not ill-appearing.   HENT:      Head: Normocephalic.      Right Ear: External ear normal.      Left Ear: External ear normal.       Nose: Nose normal.      Mouth/Throat:      Pharynx: No oropharyngeal exudate.   Eyes:      General: No scleral icterus.        Right eye: No discharge.         Left eye: No discharge.      Conjunctiva/sclera: Conjunctivae normal.      Pupils: Pupils are equal, round, and reactive to light.   Neck:      Thyroid: No thyroid mass or thyromegaly.   Cardiovascular:      Rate and Rhythm: Normal rate and regular rhythm.      Heart sounds: Normal heart sounds. No murmur heard.  Pulmonary:      Effort: Pulmonary effort is normal. No respiratory distress.      Breath sounds: Normal breath sounds. No wheezing or rales.   Chest:      Chest wall: No tenderness.   Abdominal:      General: Bowel sounds are normal. There is no distension.      Palpations: Abdomen is soft. There is no mass.      Tenderness: There is no abdominal tenderness. There is no guarding or rebound.      Hernia: No hernia is present.   Genitourinary:     Exam position: Prone.      Labia:         Right: No rash, tenderness or lesion.         Left: No rash, tenderness, lesion or injury.       Vagina: Normal. No vaginal discharge or tenderness.      Cervix: No cervical motion tenderness, discharge or friability.      Uterus: Not enlarged and not tender.       Adnexa:         Right: No mass or tenderness.          Left: No mass or tenderness.        Comments: Urethra and urethral meatus normal.    Bladder - normal, no prolapse.  Perineum and rectum examined - intact and no lesions.    Musculoskeletal:         General: No tenderness or deformity. Normal range of motion.      Cervical back: Normal range of motion and neck supple.   Lymphadenopathy:      Cervical: No cervical adenopathy.   Skin:     General: Skin is warm and dry.      Coloration: Skin is not pale.      Findings: No erythema or rash.   Neurological:      Mental Status: She is alert and oriented to person, place, and time.      Motor: No abnormal muscle tone.      Coordination: Coordination normal.     "  Deep Tendon Reflexes: Reflexes are normal and symmetric.   Psychiatric:         Behavior: Behavior normal. Behavior is cooperative.         Thought Content: Thought content normal.         Judgment: Judgment normal.           Vitals:    07/29/24 0855   BP: 130/68   Weight: 81.2 kg (179 lb)   Height: 167.6 cm (66\")       Diagnoses and all orders for this visit:    1. Well woman exam with routine gynecological exam (Primary)  Comments:  Normal well woman exam.  Mammogram scheduled for later today.    2. Overweight  Comments:  Patient has been on Saxenda in the past and would like to restart it.  Rx for Saxenda sent to pharmacy and patient instructed in use.  RTO in 4 weeks for follow-up or sooner as needed.   Orders:  -     Liraglutide (Saxenda) 18 MG/3ML injection pen; Inject 0.6 mg under the skin into the appropriate area as directed Daily.  Dispense: 30 mL; Refill: 2        Normal GYN exam. Will have lab work here. Encouraged SBE.  Pt is aware how to do self breast exam and the importance of same. Discussed weight management and importance of maintaining a healthy weight. Discussed Vitamin D intake and the importance of adequate vitamin D for both bone health and a healthy immune system.  Discussed daily exercise and the importance of same in regards to a healthy heart as well as helping to maintain her weight and improving her mental health.  Body mass index is 28.89 kg/m². Colonoscopy is up to date.  Mammogram will be scheduled at [facility]. Pap smear is done per ASCCP guidelines.                  Non-Smoker    MyChart Instructions Given       "

## 2024-08-28 ENCOUNTER — TELEPHONE (OUTPATIENT)
Dept: FAMILY MEDICINE CLINIC | Facility: CLINIC | Age: 49
End: 2024-08-28
Payer: COMMERCIAL

## 2024-08-28 NOTE — TELEPHONE ENCOUNTER
Tried calling patient to let her know she will need to schedule a yearly physical to have her [employer] form willed out.

## 2024-08-30 DIAGNOSIS — J30.2 SEASONAL ALLERGIES: ICD-10-CM

## 2024-08-30 RX ORDER — LEVOCETIRIZINE DIHYDROCHLORIDE 5 MG/1
5 TABLET, FILM COATED ORAL EVERY EVENING
Qty: 30 TABLET | Refills: 0 | Status: SHIPPED | OUTPATIENT
Start: 2024-08-30

## 2024-08-30 NOTE — TELEPHONE ENCOUNTER
Rx Refill Note  Requested Prescriptions     Pending Prescriptions Disp Refills    levocetirizine (XYZAL) 5 MG tablet [Pharmacy Med Name: LEVOCETIRIZI 5MG] 30 tablet 0     Sig: TAKE 1 TABLET BY MOUTH EVERY EVENING.      Last office visit with prescribing clinician: 7/1/2024   Last telemedicine visit with prescribing clinician: Visit date not found   Next office visit with prescribing clinician: Visit date not found                         Would you like a call back once the refill request has been completed: [] Yes [] No    If the office needs to give you a call back, can they leave a voicemail: [] Yes [] No    Maribell Mcgovern, PCT  08/30/24, 10:52 CDT

## 2024-09-03 ENCOUNTER — OFFICE VISIT (OUTPATIENT)
Dept: FAMILY MEDICINE CLINIC | Facility: CLINIC | Age: 49
End: 2024-09-03
Payer: COMMERCIAL

## 2024-09-03 VITALS
DIASTOLIC BLOOD PRESSURE: 68 MMHG | SYSTOLIC BLOOD PRESSURE: 132 MMHG | HEIGHT: 66 IN | TEMPERATURE: 98.9 F | OXYGEN SATURATION: 98 % | HEART RATE: 74 BPM | RESPIRATION RATE: 17 BRPM | BODY MASS INDEX: 29.06 KG/M2 | WEIGHT: 180.8 LBS

## 2024-09-03 DIAGNOSIS — Z00.00 ROUTINE GENERAL MEDICAL EXAMINATION AT A HEALTH CARE FACILITY: Primary | ICD-10-CM

## 2024-09-03 DIAGNOSIS — M54.9 BACK PAIN, UNSPECIFIED BACK LOCATION, UNSPECIFIED BACK PAIN LATERALITY, UNSPECIFIED CHRONICITY: ICD-10-CM

## 2024-09-03 LAB
BILIRUB BLD-MCNC: NEGATIVE MG/DL
CLARITY, POC: CLEAR
COLOR UR: YELLOW
GLUCOSE UR STRIP-MCNC: NEGATIVE MG/DL
KETONES UR QL: NEGATIVE
LEUKOCYTE EST, POC: ABNORMAL
NITRITE UR-MCNC: NEGATIVE MG/ML
PH UR: 6 [PH] (ref 5–8)
PROT UR STRIP-MCNC: NEGATIVE MG/DL
RBC # UR STRIP: NEGATIVE /UL
SP GR UR: 1.01 (ref 1–1.03)
UROBILINOGEN UR QL: NORMAL

## 2024-09-03 NOTE — PROGRESS NOTES
Chief Complaint   Patient presents with    Annual Exam       History:  кЕатерина Stovall is a 48 y.o. female who presents today for evaluation of the above problems.      48-year-old female for annual wellness exam              ROS:  Review of Systems   Respiratory:  Negative for shortness of breath.    Cardiovascular:  Negative for chest pain and leg swelling.   Gastrointestinal:         Colonoscopy is up-to-date   Genitourinary: Negative.         CT of kidneys normal 2021   Musculoskeletal: Negative.    Neurological:         Family history of dementia   All other systems reviewed and are negative.      No Known Allergies  Past Medical History:   Diagnosis Date    Allergic 2011    Cats,horses,dogs, trees and seasonal allergies    Anxiety     Arrhythmia     COVID-19 07/2022    Hx of adenomatous polyp of colon 2024    Hyperlipidemia     Migraine     Seasonal allergies      Past Surgical History:   Procedure Laterality Date    APPENDECTOMY      ARM LESION/CYST EXCISION Left 01/21/2022    Procedure: LEFT RING FINGER NAILBED LESION EXCISIONAL BIOPSY;  Surgeon: Harry Holt MD;  Location: [facility] OR;  Service: Plastics;  Laterality: Left;    COLONOSCOPY      COLONOSCOPY N/A 3/13/2024    Procedure: COLONOSCOPY WITH ANESTHESIA;  Surgeon: Tara Roblero MD;  Location: [facility] ENDOSCOPY;  Service: Gastroenterology;  Laterality: N/A;  pre: screen  post:  polyp  settle    FINGER MASS EXCISION      LAPAROSCOPIC SALPINGOOPHERECTOMY Bilateral     OOPHORECTOMY      TOTAL ABDOMINAL HYSTERECTOMY      RIVKA w/ BSO due to pain caused by varicose veins and heavy bleeding.    TUBAL ABDOMINAL LIGATION       Family History   Problem Relation Age of Onset    Ovarian cancer Mother     Arthritis Mother     Asthma Mother     Cancer Mother     Thyroid disease Mother     COPD Mother     Depression Mother     Heart disease Father     Hyperlipidemia Father     Diabetes Father     Hypertension Father     Uterine cancer Maternal Aunt     Breast  "cancer Maternal Aunt     Cancer Maternal Aunt     Cancer Maternal Aunt     Breast cancer Maternal Grandmother     Cancer Maternal Grandmother     Breast cancer Paternal Grandmother     Colon cancer Neg Hx     Melanoma Neg Hx     Colon polyps Neg Hx     Esophageal cancer Neg Hx     Liver cancer Neg Hx     Stomach cancer Neg Hx     Rectal cancer Neg Hx     Liver disease Neg Hx       reports that she has never smoked. She has been exposed to tobacco smoke. She has never used smokeless tobacco. She reports that she does not currently use alcohol. She reports that she does not use drugs.      Current Outpatient Medications:     Black Cohosh-SoyIsoflav-C Quad 40- MG capsule, , Disp: , Rfl:     escitalopram (LEXAPRO) 10 MG tablet, Take 1 tablet by mouth Daily., Disp: 90 tablet, Rfl: 3    Inulin (Fiber Choice) 1.5 g chewable tablet, Chew 2 tablets Daily., Disp: , Rfl:     levocetirizine (XYZAL) 5 MG tablet, TAKE 1 TABLET BY MOUTH EVERY EVENING., Disp: 30 tablet, Rfl: 0    Liraglutide (Saxenda) 18 MG/3ML injection pen, Inject 0.6 mg under the skin into the appropriate area as directed Daily., Disp: 30 mL, Rfl: 2    montelukast (SINGULAIR) 10 MG tablet, Take 1 tablet by mouth every night at bedtime., Disp: 90 tablet, Rfl: 3    Probiotic Product (Align) 4 MG capsule, Take 2 tablets by mouth Daily., Disp: , Rfl:     Rhubarb (ESTROVEN COMPLETE PO), Take  by mouth., Disp: , Rfl:     NON FORMULARY, Semaglutide 2.5mg/mL compund Sterile injection. Inject 0.1 mL subQ once weekly for 4 weeks, then increase to injection 0.2 mL subQ weekly there after. (Patient not taking: Reported on 9/3/2024), Disp: 4 each, Rfl: 0    OBJECTIVE:  /68 (BP Location: Left arm, Patient Position: Sitting, Cuff Size: Large Adult)   Pulse 74   Temp 98.9 °F (37.2 °C) (Infrared)   Resp 17   Ht 167.6 cm (66\")   Wt 82 kg (180 lb 12.8 oz)   SpO2 98%   BMI 29.18 kg/m²    Physical Exam  Vitals and nursing note reviewed.   Constitutional:       " Appearance: Normal appearance.   HENT:      Right Ear: Tympanic membrane and ear canal normal.      Left Ear: Tympanic membrane and ear canal normal.   Eyes:      Extraocular Movements: Extraocular movements intact.      Pupils: Pupils are equal, round, and reactive to light.   Neck:      Vascular: No carotid bruit.   Cardiovascular:      Rate and Rhythm: Normal rate and regular rhythm.      Pulses: Normal pulses.      Heart sounds: Normal heart sounds.   Pulmonary:      Effort: Pulmonary effort is normal.      Breath sounds: Normal breath sounds.      Comments: Breast exam by another provider  Abdominal:      General: Abdomen is flat.      Palpations: Abdomen is soft. There is no mass.   Genitourinary:     Comments: Surgical pain  Musculoskeletal:      Right lower leg: No edema.      Left lower leg: No edema.   Lymphadenopathy:      Cervical: No cervical adenopathy.   Skin:     General: Skin is warm and dry.   Neurological:      General: No focal deficit present.      Mental Status: She is alert and oriented to person, place, and time.   Psychiatric:         Mood and Affect: Mood normal.         Behavior: Behavior normal.         Thought Content: Thought content normal.         Judgment: Judgment normal.                 Health Maintenance:  Immunization History   Administered Date(s) Administered    COVID-19 (MODERNA) 1st,2nd,3rd Dose Monovalent 08/23/2021, 09/20/2021    Fluzone (or Fluarix & Flulaval for VFC) >6mos 10/20/2021        Up-to-date after today    Assessment/Plan    Diagnoses and all orders for this visit:    1. Routine general medical examination at a health care facility (Primary)  -     TSH  -     T4, free  -     CBC & Differential  -     Comprehensive Metabolic Panel  -     Lipid Panel    2. Back pain, unspecified back location, unspecified back pain laterality, unspecified chronicity  -     POC Urinalysis Dipstick, Multipro      Plan above-continue exercising etc.    An After Visit Summary was  printed and given to the patient at discharge.  Return in 6 months (on 3/3/2025), or if symptoms worsen or fail to improve.         Fermín Rosas MD 9/3/2024   Electronically signed.

## 2024-09-04 LAB
ALBUMIN SERPL-MCNC: 4.5 G/DL (ref 3.5–5.2)
ALBUMIN/GLOB SERPL: 2.4 G/DL
ALP SERPL-CCNC: 79 U/L (ref 39–117)
ALT SERPL-CCNC: 36 U/L (ref 1–33)
AST SERPL-CCNC: 24 U/L (ref 1–32)
BASOPHILS # BLD AUTO: 0.05 10*3/MM3 (ref 0–0.2)
BASOPHILS NFR BLD AUTO: 1.4 % (ref 0–1.5)
BILIRUB SERPL-MCNC: 0.5 MG/DL (ref 0–1.2)
BUN SERPL-MCNC: 9 MG/DL (ref 6–20)
BUN/CREAT SERPL: 12 (ref 7–25)
CALCIUM SERPL-MCNC: 10.1 MG/DL (ref 8.6–10.5)
CHLORIDE SERPL-SCNC: 104 MMOL/L (ref 98–107)
CHOLEST SERPL-MCNC: 216 MG/DL (ref 0–200)
CO2 SERPL-SCNC: 30 MMOL/L (ref 22–29)
CREAT SERPL-MCNC: 0.75 MG/DL (ref 0.57–1)
EGFRCR SERPLBLD CKD-EPI 2021: 97.7 ML/MIN/1.73
EOSINOPHIL # BLD AUTO: 0.11 10*3/MM3 (ref 0–0.4)
EOSINOPHIL NFR BLD AUTO: 3 % (ref 0.3–6.2)
ERYTHROCYTE [DISTWIDTH] IN BLOOD BY AUTOMATED COUNT: 13.3 % (ref 12.3–15.4)
GLOBULIN SER CALC-MCNC: 1.9 GM/DL
GLUCOSE SERPL-MCNC: 83 MG/DL (ref 65–99)
HCT VFR BLD AUTO: 44.1 % (ref 34–46.6)
HDLC SERPL-MCNC: 40 MG/DL (ref 40–60)
HGB BLD-MCNC: 14.2 G/DL (ref 12–15.9)
IMM GRANULOCYTES # BLD AUTO: 0.01 10*3/MM3 (ref 0–0.05)
IMM GRANULOCYTES NFR BLD AUTO: 0.3 % (ref 0–0.5)
LDLC SERPL CALC-MCNC: 148 MG/DL (ref 0–100)
LYMPHOCYTES # BLD AUTO: 1.11 10*3/MM3 (ref 0.7–3.1)
LYMPHOCYTES NFR BLD AUTO: 30.2 % (ref 19.6–45.3)
MCH RBC QN AUTO: 28.6 PG (ref 26.6–33)
MCHC RBC AUTO-ENTMCNC: 32.2 G/DL (ref 31.5–35.7)
MCV RBC AUTO: 88.9 FL (ref 79–97)
MONOCYTES # BLD AUTO: 0.48 10*3/MM3 (ref 0.1–0.9)
MONOCYTES NFR BLD AUTO: 13.1 % (ref 5–12)
NEUTROPHILS # BLD AUTO: 1.91 10*3/MM3 (ref 1.7–7)
NEUTROPHILS NFR BLD AUTO: 52 % (ref 42.7–76)
NRBC BLD AUTO-RTO: 0 /100 WBC (ref 0–0.2)
PLATELET # BLD AUTO: 278 10*3/MM3 (ref 140–450)
POTASSIUM SERPL-SCNC: 4.7 MMOL/L (ref 3.5–5.2)
PROT SERPL-MCNC: 6.4 G/DL (ref 6–8.5)
RBC # BLD AUTO: 4.96 10*6/MM3 (ref 3.77–5.28)
SODIUM SERPL-SCNC: 143 MMOL/L (ref 136–145)
T4 FREE SERPL-MCNC: 0.92 NG/DL (ref 0.92–1.68)
TRIGL SERPL-MCNC: 153 MG/DL (ref 0–150)
TSH SERPL DL<=0.005 MIU/L-ACNC: 1.66 UIU/ML (ref 0.27–4.2)
VLDLC SERPL CALC-MCNC: 28 MG/DL (ref 5–40)
WBC # BLD AUTO: 3.67 10*3/MM3 (ref 3.4–10.8)

## 2024-09-13 ENCOUNTER — OFFICE VISIT (OUTPATIENT)
Dept: OBSTETRICS AND GYNECOLOGY | Age: 49
End: 2024-09-13
Payer: COMMERCIAL

## 2024-09-13 VITALS
HEIGHT: 66 IN | SYSTOLIC BLOOD PRESSURE: 118 MMHG | BODY MASS INDEX: 28.61 KG/M2 | DIASTOLIC BLOOD PRESSURE: 60 MMHG | WEIGHT: 178 LBS

## 2024-09-13 DIAGNOSIS — E66.3 OVERWEIGHT WITH BODY MASS INDEX (BMI) OF 28 TO 28.9 IN ADULT: Primary | ICD-10-CM

## 2024-09-13 RX ORDER — LIRAGLUTIDE 6 MG/ML
INJECTION, SOLUTION SUBCUTANEOUS
Qty: 15 ML | Refills: 5 | Status: SHIPPED | OUTPATIENT
Start: 2024-09-13

## 2024-09-30 DIAGNOSIS — J30.2 SEASONAL ALLERGIES: ICD-10-CM

## 2024-09-30 RX ORDER — LEVOCETIRIZINE DIHYDROCHLORIDE 5 MG/1
5 TABLET, FILM COATED ORAL EVERY EVENING
Qty: 90 TABLET | Refills: 3 | Status: SHIPPED | OUTPATIENT
Start: 2024-09-30

## 2024-09-30 NOTE — TELEPHONE ENCOUNTER
Rx Refill Note  Requested Prescriptions     Pending Prescriptions Disp Refills    levocetirizine (XYZAL) 5 MG tablet 30 tablet 0     Sig: Take 1 tablet by mouth Every Evening.      Last office visit with prescribing clinician: 7/1/2024   Last telemedicine visit with prescribing clinician: Visit date not found   Next office visit with prescribing clinician: Visit date not found   CPE done                       Would you like a call back once the refill request has been completed: [] Yes [] No    If the office needs to give you a call back, can they leave a voicemail: [] Yes [] No    Deisi Piedra MA  09/30/24, 16:27 CDT

## 2024-10-11 ENCOUNTER — OFFICE VISIT (OUTPATIENT)
Dept: OBSTETRICS AND GYNECOLOGY | Age: 49
End: 2024-10-11
Payer: COMMERCIAL

## 2024-10-11 VITALS
SYSTOLIC BLOOD PRESSURE: 112 MMHG | WEIGHT: 176 LBS | BODY MASS INDEX: 28.28 KG/M2 | HEIGHT: 66 IN | DIASTOLIC BLOOD PRESSURE: 52 MMHG

## 2024-10-11 DIAGNOSIS — E66.3 OVERWEIGHT: Primary | ICD-10-CM

## 2024-10-11 NOTE — PROGRESS NOTES
Subjective   Екатерина Stovall is a 48 y.o. female  YOB: 1975      Chief Complaint   Patient presents with    Follow-up     Patient presents today for a follow up on meds  Liraglutide.       Patient here for weight check and advancement of Saxenda.    Follow-upPertinent negatives include no chest pain, no confusion, no dizziness, no nausea, no palpitations, no shortness of breath, no fatigue, no polydipsia, no polyphagia, no polyuria, no weakness, no headaches, no neck pain, no myalgias, no wheezing, no abdominal pain, no choking, no cough, no sore throat, is not nervous/anxious, no cold intolerance, no constipation, no diaphoresis, no diarrhea, no heat intolerance, no menstrual problem, no tremors and no trouble swallowing.       The following portions of the patient's history were reviewed and updated as appropriate: allergies, current medications, past family history, past medical history, past social history, past surgical history, and problem list.    No Known Allergies    Past Medical History:   Diagnosis Date    Allergic 2011    Cats,horses,dogs, trees and seasonal allergies    Anxiety     Arrhythmia     COVID-19 07/2022    Hx of adenomatous polyp of colon 2024    Hyperlipidemia     Migraine     Seasonal allergies        Family History   Problem Relation Age of Onset    Ovarian cancer Mother     Arthritis Mother     Asthma Mother     Cancer Mother     Thyroid disease Mother     COPD Mother     Depression Mother     Heart disease Father     Hyperlipidemia Father     Diabetes Father     Hypertension Father     Uterine cancer Maternal Aunt     Breast cancer Maternal Aunt     Cancer Maternal Aunt     Cancer Maternal Aunt     Breast cancer Maternal Grandmother     Cancer Maternal Grandmother     Breast cancer Paternal Grandmother     Colon cancer Neg Hx     Melanoma Neg Hx     Colon polyps Neg Hx     Esophageal cancer Neg Hx     Liver cancer Neg Hx     Stomach cancer Neg Hx     Rectal cancer Neg Hx      Liver disease Neg Hx        Social History     Socioeconomic History    Marital status:    Tobacco Use    Smoking status: Never     Passive exposure: Past    Smokeless tobacco: Never   Vaping Use    Vaping status: Never Used   Substance and Sexual Activity    Alcohol use: Not Currently     Comment: I very seldom have a glass of wine    Drug use: Never    Sexual activity: Yes     Partners: Male     Birth control/protection: Hysterectomy     Comment:          Current Outpatient Medications:     Black Cohosh-SoyIsoflav-C Quad 40- MG capsule, , Disp: , Rfl:     escitalopram (LEXAPRO) 10 MG tablet, Take 1 tablet by mouth Daily., Disp: 90 tablet, Rfl: 3    Inulin (Fiber Choice) 1.5 g chewable tablet, Chew 2 tablets Daily., Disp: , Rfl:     levocetirizine (XYZAL) 5 MG tablet, Take 1 tablet by mouth Every Evening., Disp: 90 tablet, Rfl: 3    Liraglutide (Saxenda) 18 MG/3ML injection pen, 0.6 mg x 1 week, 1.2 mg x 1 week, 1.8 mg x 1 week, 2.4 mg x 1 week, then 3 mg daily, Disp: 15 mL, Rfl: 5    montelukast (SINGULAIR) 10 MG tablet, Take 1 tablet by mouth every night at bedtime., Disp: 90 tablet, Rfl: 3    Probiotic Product (Align) 4 MG capsule, Take 2 tablets by mouth Daily., Disp: , Rfl:     Rhubarb (ESTROVEN COMPLETE PO), Take  by mouth., Disp: , Rfl:     No LMP recorded. Patient has had a hysterectomy.    Sexual History:         Could not be calculated    Past Surgical History:   Procedure Laterality Date    APPENDECTOMY      ARM LESION/CYST EXCISION Left 01/21/2022    Procedure: LEFT RING FINGER NAILBED LESION EXCISIONAL BIOPSY;  Surgeon: Harry Holt MD;  Location: [facility] OR;  Service: Plastics;  Laterality: Left;    COLONOSCOPY      COLONOSCOPY N/A 3/13/2024    Procedure: COLONOSCOPY WITH ANESTHESIA;  Surgeon: Tara Roblero MD;  Location: [facility] ENDOSCOPY;  Service: Gastroenterology;  Laterality: N/A;  pre: screen  post:  polyp  settle    FINGER MASS EXCISION      LAPAROSCOPIC  SALPINGOOPHERECTOMY Bilateral     OOPHORECTOMY      TOTAL ABDOMINAL HYSTERECTOMY      RIVKA w/ BSO due to pain caused by varicose veins and heavy bleeding.    TUBAL ABDOMINAL LIGATION         Review of Systems   Constitutional:  Negative for activity change, appetite change, chills, diaphoresis, fatigue, fever and unexpected weight change.   HENT:  Negative for congestion, dental problem, drooling, ear discharge, ear pain, facial swelling, hearing loss, mouth sores, nosebleeds, postnasal drip, rhinorrhea, sinus pressure, sinus pain, sneezing, sore throat, tinnitus, trouble swallowing and voice change.    Eyes:  Negative for photophobia, pain, discharge, redness, itching and visual disturbance.   Respiratory:  Negative for apnea, cough, choking, chest tightness, shortness of breath, wheezing and stridor.    Cardiovascular:  Negative for chest pain, palpitations and leg swelling.   Gastrointestinal:  Negative for abdominal distention, abdominal pain, anal bleeding, blood in stool, constipation, diarrhea, nausea, rectal pain and vomiting.   Endocrine: Negative for cold intolerance, heat intolerance, polydipsia, polyphagia and polyuria.   Genitourinary:  Negative for decreased urine volume, difficulty urinating, dyspareunia, dysuria, enuresis, flank pain, frequency, genital sores, hematuria, menstrual problem, pelvic pain, urgency, vaginal bleeding, vaginal discharge and vaginal pain.   Musculoskeletal:  Negative for arthralgias, back pain, gait problem, joint swelling, myalgias, neck pain and neck stiffness.   Skin:  Negative for color change, pallor, rash and wound.   Allergic/Immunologic: Negative for environmental allergies, food allergies and immunocompromised state.   Neurological:  Negative for dizziness, tremors, seizures, syncope, facial asymmetry, speech difficulty, weakness, light-headedness, numbness and headaches.   Hematological:  Negative for adenopathy. Does not bruise/bleed easily.  "  Psychiatric/Behavioral:  Negative for agitation, behavioral problems, confusion, decreased concentration, dysphoric mood, hallucinations, self-injury, sleep disturbance and suicidal ideas. The patient is not nervous/anxious and is not hyperactive.        Objective   Physical Exam  Vitals and nursing note reviewed.   Constitutional:       Appearance: She is well-developed.   HENT:      Head: Normocephalic.   Eyes:      Pupils: Pupils are equal, round, and reactive to light.   Cardiovascular:      Rate and Rhythm: Normal rate and regular rhythm.   Pulmonary:      Effort: Pulmonary effort is normal.      Breath sounds: Normal breath sounds.   Abdominal:      Palpations: Abdomen is soft.   Musculoskeletal:         General: Normal range of motion.      Cervical back: Normal range of motion.   Skin:     General: Skin is warm and dry.   Neurological:      Mental Status: She is alert and oriented to person, place, and time.   Psychiatric:         Behavior: Behavior normal.           Vitals:    10/11/24 0914   BP: 112/52   Weight: 79.8 kg (176 lb)   Height: 167.6 cm (66\")       Diagnoses and all orders for this visit:    1. Overweight (Primary)  Comments:  Patient reports she is doing well and Saxenda 1.7 mg.  Has lost 2 pounds since last visit.  Discussed diet and exercise at length.  Patient to admits to 2.4 mg.  RTO in 4 weeks for follow-up or sooner as needed.                        Non-Smoker    MyChart Instructions Given       "

## 2024-12-30 ENCOUNTER — OFFICE VISIT (OUTPATIENT)
Dept: FAMILY MEDICINE CLINIC | Facility: CLINIC | Age: 49
End: 2024-12-30
Payer: COMMERCIAL

## 2024-12-30 VITALS
HEIGHT: 66 IN | RESPIRATION RATE: 18 BRPM | WEIGHT: 175 LBS | SYSTOLIC BLOOD PRESSURE: 112 MMHG | TEMPERATURE: 97.6 F | HEART RATE: 78 BPM | OXYGEN SATURATION: 98 % | BODY MASS INDEX: 28.12 KG/M2 | DIASTOLIC BLOOD PRESSURE: 52 MMHG

## 2024-12-30 DIAGNOSIS — R52 BODY ACHES: ICD-10-CM

## 2024-12-30 DIAGNOSIS — B34.9 VIRAL ILLNESS: Primary | ICD-10-CM

## 2024-12-30 LAB
EXPIRATION DATE: NORMAL
FLUAV AG UPPER RESP QL IA.RAPID: NOT DETECTED
FLUBV AG UPPER RESP QL IA.RAPID: NOT DETECTED
INTERNAL CONTROL: NORMAL
Lab: NORMAL
SARS-COV-2 AG UPPER RESP QL IA.RAPID: NOT DETECTED

## 2024-12-30 PROCEDURE — 99213 OFFICE O/P EST LOW 20 MIN: CPT | Performed by: NURSE PRACTITIONER

## 2024-12-30 PROCEDURE — 87428 SARSCOV & INF VIR A&B AG IA: CPT | Performed by: NURSE PRACTITIONER

## 2024-12-30 RX ORDER — TRIAMCINOLONE ACETONIDE 40 MG/ML
40 INJECTION, SUSPENSION INTRA-ARTICULAR; INTRAMUSCULAR ONCE
Status: COMPLETED | OUTPATIENT
Start: 2024-12-30 | End: 2024-12-30

## 2024-12-30 RX ADMIN — TRIAMCINOLONE ACETONIDE 40 MG: 40 INJECTION, SUSPENSION INTRA-ARTICULAR; INTRAMUSCULAR at 13:25

## 2024-12-30 NOTE — PROGRESS NOTES
CC:   Chief Complaint   Patient presents with    Generalized Body Aches    Headache    Chills        History:  Екатерина Stovall is a 49 y.o. female who presents today for evaluation of the above problems.      HPI     Patient presents with illness.  Reports symptoms started yesterday about 2 AM with bodyaches, chills, headache and congestion.  Taking over-the-counter immune booster medicine and trying to stay well-hydrated.  Reports  has had stomach bug.  Afebrile.      No Known Allergies  Past Medical History:   Diagnosis Date    Allergic 2011    Cats,horses,dogs, trees and seasonal allergies    Anxiety     Arrhythmia     COVID-19 07/2022    Hx of adenomatous polyp of colon 2024    Hyperlipidemia     Migraine     Seasonal allergies      Past Surgical History:   Procedure Laterality Date    APPENDECTOMY      ARM LESION/CYST EXCISION Left 01/21/2022    Procedure: LEFT RING FINGER NAILBED LESION EXCISIONAL BIOPSY;  Surgeon: Harry Holt MD;  Location: [facility] OR;  Service: Plastics;  Laterality: Left;    COLONOSCOPY      COLONOSCOPY N/A 3/13/2024    Procedure: COLONOSCOPY WITH ANESTHESIA;  Surgeon: Tara Roblero MD;  Location: [facility] ENDOSCOPY;  Service: Gastroenterology;  Laterality: N/A;  pre: screen  post:  polyp  settle    FINGER MASS EXCISION      LAPAROSCOPIC SALPINGOOPHERECTOMY Bilateral     OOPHORECTOMY      TOTAL ABDOMINAL HYSTERECTOMY      RIVKA w/ BSO due to pain caused by varicose veins and heavy bleeding.    TUBAL ABDOMINAL LIGATION       Family History   Problem Relation Age of Onset    Ovarian cancer Mother     Arthritis Mother     Asthma Mother     Cancer Mother     Thyroid disease Mother     COPD Mother     Depression Mother     Heart disease Father     Hyperlipidemia Father     Diabetes Father     Hypertension Father     Uterine cancer Maternal Aunt     Breast cancer Maternal Aunt     Cancer Maternal Aunt     Cancer Maternal Aunt     Breast cancer Maternal Grandmother     Cancer Maternal  "Grandmother     Breast cancer Paternal Grandmother     Colon cancer Neg Hx     Melanoma Neg Hx     Colon polyps Neg Hx     Esophageal cancer Neg Hx     Liver cancer Neg Hx     Stomach cancer Neg Hx     Rectal cancer Neg Hx     Liver disease Neg Hx       reports that she has never smoked. She has been exposed to tobacco smoke. She has never used smokeless tobacco. She reports that she does not currently use alcohol. She reports that she does not use drugs.      Current Outpatient Medications:     Black Cohosh-SoyIsoflav-C Quad 40- MG capsule, , Disp: , Rfl:     escitalopram (LEXAPRO) 10 MG tablet, Take 1 tablet by mouth Daily., Disp: 90 tablet, Rfl: 3    Inulin (Fiber Choice) 1.5 g chewable tablet, Chew 2 tablets Daily., Disp: , Rfl:     levocetirizine (XYZAL) 5 MG tablet, Take 1 tablet by mouth Every Evening., Disp: 90 tablet, Rfl: 3    Liraglutide (Saxenda) 18 MG/3ML injection pen, 0.6 mg x 1 week, 1.2 mg x 1 week, 1.8 mg x 1 week, 2.4 mg x 1 week, then 3 mg daily, Disp: 15 mL, Rfl: 5    montelukast (SINGULAIR) 10 MG tablet, Take 1 tablet by mouth every night at bedtime., Disp: 90 tablet, Rfl: 3    Probiotic Product (Align) 4 MG capsule, Take 2 tablets by mouth Daily., Disp: , Rfl:     Rhubarb (ESTROVEN COMPLETE PO), Take  by mouth., Disp: , Rfl:     Current Facility-Administered Medications:     triamcinolone acetonide (KENALOG-40) injection 40 mg, 40 mg, Intramuscular, Once, Rosalio, BALDO Barton    OBJECTIVE:  /52 (BP Location: Left arm, Patient Position: Sitting, Cuff Size: Adult)   Pulse 78   Temp 97.6 °F (36.4 °C) (Temporal)   Resp 18   Ht 167.6 cm (66\")   Wt 79.4 kg (175 lb)   SpO2 98%   BMI 28.25 kg/m²    Estimated body mass index is 28.25 kg/m² as calculated from the following:    Height as of this encounter: 167.6 cm (66\").    Weight as of this encounter: 79.4 kg (175 lb).                  Physical Exam  Vitals reviewed.   Constitutional:       General: She is not in acute distress.    "  Appearance: Normal appearance.   HENT:      Head: Normocephalic and atraumatic.      Right Ear: Tympanic membrane, ear canal and external ear normal.      Left Ear: Tympanic membrane, ear canal and external ear normal.      Mouth/Throat:      Pharynx: Posterior oropharyngeal erythema present. No oropharyngeal exudate.   Cardiovascular:      Rate and Rhythm: Normal rate and regular rhythm.      Heart sounds: Normal heart sounds.   Pulmonary:      Effort: No respiratory distress.      Breath sounds: Normal breath sounds. No wheezing.   Abdominal:      General: Bowel sounds are normal.   Lymphadenopathy:      Cervical: No cervical adenopathy.   Skin:     General: Skin is warm and dry.   Neurological:      Mental Status: She is alert and oriented to person, place, and time.   Psychiatric:         Mood and Affect: Mood normal.         Behavior: Behavior normal.              Assessment/Plan    Diagnoses and all orders for this visit:    1. Viral illness (Primary)  -     triamcinolone acetonide (KENALOG-40) injection 40 mg  -     POCT SARS-CoV-2 + Flu Antigen CINDY    2. Body aches    Negative for flu and COVID.  Based on symptom onset and exam-discussed with patient believe she has a viral illness and to treat symptoms, stay well-hydrated and rest.  Steroid injection to help with symptoms/congestion.  If worse or no better in 3 to 5 days-patient to let us know.            An After Visit Summary was printed and given to the patient at discharge.  Return if symptoms worsen or fail to improve.

## 2025-01-07 ENCOUNTER — OFFICE VISIT (OUTPATIENT)
Dept: FAMILY MEDICINE CLINIC | Facility: CLINIC | Age: 50
End: 2025-01-07
Payer: COMMERCIAL

## 2025-01-07 VITALS
BODY MASS INDEX: 28.28 KG/M2 | WEIGHT: 176 LBS | OXYGEN SATURATION: 99 % | DIASTOLIC BLOOD PRESSURE: 72 MMHG | TEMPERATURE: 97.1 F | HEIGHT: 66 IN | SYSTOLIC BLOOD PRESSURE: 116 MMHG | RESPIRATION RATE: 16 BRPM | HEART RATE: 65 BPM

## 2025-01-07 DIAGNOSIS — R42 VERTIGO: Primary | ICD-10-CM

## 2025-01-07 DIAGNOSIS — R19.7 DIARRHEA, UNSPECIFIED TYPE: ICD-10-CM

## 2025-01-07 PROCEDURE — 99213 OFFICE O/P EST LOW 20 MIN: CPT | Performed by: FAMILY MEDICINE

## 2025-01-07 PROCEDURE — 96372 THER/PROPH/DIAG INJ SC/IM: CPT | Performed by: FAMILY MEDICINE

## 2025-01-07 RX ORDER — PROMETHAZINE HYDROCHLORIDE 25 MG/ML
12.5 INJECTION, SOLUTION INTRAMUSCULAR; INTRAVENOUS ONCE
Status: COMPLETED | OUTPATIENT
Start: 2025-01-07 | End: 2025-01-07

## 2025-01-07 RX ORDER — MECLIZINE HCL 12.5 MG 12.5 MG/1
TABLET ORAL
Qty: 60 TABLET | Refills: 1 | Status: SHIPPED | OUTPATIENT
Start: 2025-01-07

## 2025-01-07 RX ADMIN — PROMETHAZINE HYDROCHLORIDE 12.5 MG: 25 INJECTION, SOLUTION INTRAMUSCULAR; INTRAVENOUS at 13:36

## 2025-01-07 NOTE — PROGRESS NOTES
Subjective   Екатерина Stovall is a 49 y.o. female.     History of Present Illness  3-day history of vertigo      The following portions of the patient's history were reviewed and updated as appropriate: allergies, current medications, past family history, past medical history, past social history, past surgical history, and problem list.    Review of Systems   Respiratory:  Negative for shortness of breath.    Cardiovascular:  Negative for chest pain and leg swelling.   Gastrointestinal:         Recent intestinal flu   Neurological:  Negative for headaches.        Vertigo post flu       Objective   Physical Exam  Vitals and nursing note reviewed.   Constitutional:       Appearance: Normal appearance.   HENT:      Right Ear: Tympanic membrane and ear canal normal.      Left Ear: Tympanic membrane and ear canal normal.      Mouth/Throat:      Mouth: Mucous membranes are moist.      Pharynx: Oropharynx is clear.   Eyes:      Extraocular Movements: Extraocular movements intact.      Pupils: Pupils are equal, round, and reactive to light.   Neck:      Vascular: No carotid bruit.   Cardiovascular:      Rate and Rhythm: Normal rate and regular rhythm.   Pulmonary:      Effort: Pulmonary effort is normal.      Breath sounds: Normal breath sounds.   Abdominal:      General: Abdomen is flat.      Palpations: Abdomen is soft.   Musculoskeletal:      Right lower leg: No edema.      Left lower leg: No edema.   Skin:     General: Skin is warm and dry.   Neurological:      General: No focal deficit present.      Mental Status: She is alert and oriented to person, place, and time.   Psychiatric:         Mood and Affect: Mood normal.         Behavior: Behavior normal.         Thought Content: Thought content normal.         Judgment: Judgment normal.         Assessment & Plan   Diagnoses and all orders for this visit:    1. Vertigo (Primary)  -     CBC & Differential  -     Comprehensive Metabolic Panel  -     promethazine (PHENERGAN)  injection 12.5 mg    2. Diarrhea, unspecified type  -     CBC & Differential  -     Comprehensive Metabolic Panel  -     promethazine (PHENERGAN) injection 12.5 mg    Other orders  -     meclizine (ANTIVERT) 12.5 MG tablet; 1-2 every 6-8 hours for vertigo  Dispense: 60 tablet; Refill: 1         Plan above-if does not clear will need x-rays

## 2025-01-08 LAB
ALBUMIN SERPL-MCNC: 4 G/DL (ref 3.5–5.2)
ALBUMIN/GLOB SERPL: 1.5 G/DL
ALP SERPL-CCNC: 89 U/L (ref 39–117)
ALT SERPL-CCNC: 38 U/L (ref 1–33)
AST SERPL-CCNC: 28 U/L (ref 1–32)
BASOPHILS # BLD AUTO: 0.05 10*3/MM3 (ref 0–0.2)
BASOPHILS NFR BLD AUTO: 1 % (ref 0–1.5)
BILIRUB SERPL-MCNC: 0.4 MG/DL (ref 0–1.2)
BUN SERPL-MCNC: 9 MG/DL (ref 6–20)
BUN/CREAT SERPL: 10.8 (ref 7–25)
CALCIUM SERPL-MCNC: 9.9 MG/DL (ref 8.6–10.5)
CHLORIDE SERPL-SCNC: 103 MMOL/L (ref 98–107)
CO2 SERPL-SCNC: 31.3 MMOL/L (ref 22–29)
CREAT SERPL-MCNC: 0.83 MG/DL (ref 0.57–1)
EGFRCR SERPLBLD CKD-EPI 2021: 86 ML/MIN/1.73
EOSINOPHIL # BLD AUTO: 0.07 10*3/MM3 (ref 0–0.4)
EOSINOPHIL NFR BLD AUTO: 1.3 % (ref 0.3–6.2)
ERYTHROCYTE [DISTWIDTH] IN BLOOD BY AUTOMATED COUNT: 12.2 % (ref 12.3–15.4)
GLOBULIN SER CALC-MCNC: 2.6 GM/DL
GLUCOSE SERPL-MCNC: 84 MG/DL (ref 65–99)
HCT VFR BLD AUTO: 43.5 % (ref 34–46.6)
HGB BLD-MCNC: 13.7 G/DL (ref 12–15.9)
IMM GRANULOCYTES # BLD AUTO: 0.03 10*3/MM3 (ref 0–0.05)
IMM GRANULOCYTES NFR BLD AUTO: 0.6 % (ref 0–0.5)
LYMPHOCYTES # BLD AUTO: 1.42 10*3/MM3 (ref 0.7–3.1)
LYMPHOCYTES NFR BLD AUTO: 27.3 % (ref 19.6–45.3)
MCH RBC QN AUTO: 28.3 PG (ref 26.6–33)
MCHC RBC AUTO-ENTMCNC: 31.5 G/DL (ref 31.5–35.7)
MCV RBC AUTO: 89.9 FL (ref 79–97)
MONOCYTES # BLD AUTO: 0.51 10*3/MM3 (ref 0.1–0.9)
MONOCYTES NFR BLD AUTO: 9.8 % (ref 5–12)
NEUTROPHILS # BLD AUTO: 3.13 10*3/MM3 (ref 1.7–7)
NEUTROPHILS NFR BLD AUTO: 60 % (ref 42.7–76)
NRBC BLD AUTO-RTO: 0 /100 WBC (ref 0–0.2)
PLATELET # BLD AUTO: 422 10*3/MM3 (ref 140–450)
POTASSIUM SERPL-SCNC: 4.3 MMOL/L (ref 3.5–5.2)
PROT SERPL-MCNC: 6.6 G/DL (ref 6–8.5)
RBC # BLD AUTO: 4.84 10*6/MM3 (ref 3.77–5.28)
SODIUM SERPL-SCNC: 142 MMOL/L (ref 136–145)
WBC # BLD AUTO: 5.21 10*3/MM3 (ref 3.4–10.8)

## 2025-01-15 DIAGNOSIS — R42 SEVERE DIZZINESS: ICD-10-CM

## 2025-01-15 DIAGNOSIS — H81.09 MENIERE'S DISEASE, UNSPECIFIED LATERALITY: ICD-10-CM

## 2025-01-15 DIAGNOSIS — R42 VERTIGO: Primary | ICD-10-CM

## 2025-02-04 ENCOUNTER — HOSPITAL ENCOUNTER (OUTPATIENT)
Dept: MRI IMAGING | Facility: HOSPITAL | Age: 50
Discharge: HOME | End: 2025-02-04
Admitting: FAMILY MEDICINE
Payer: COMMERCIAL

## 2025-02-04 DIAGNOSIS — H81.09 MENIERE'S DISEASE, UNSPECIFIED LATERALITY: ICD-10-CM

## 2025-02-04 DIAGNOSIS — R42 SEVERE DIZZINESS: ICD-10-CM

## 2025-02-04 DIAGNOSIS — R42 VERTIGO: ICD-10-CM

## 2025-02-04 PROCEDURE — 70551 MRI BRAIN STEM W/O DYE: CPT

## 2025-02-18 RX ORDER — SULFACETAMIDE SODIUM 100 MG/ML
1 SOLUTION/ DROPS OPHTHALMIC
Qty: 10 ML | Refills: 0 | Status: SHIPPED | OUTPATIENT
Start: 2025-02-18

## 2025-03-11 ENCOUNTER — OFFICE VISIT (OUTPATIENT)
Dept: FAMILY MEDICINE CLINIC | Facility: CLINIC | Age: 50
End: 2025-03-11
Payer: COMMERCIAL

## 2025-03-11 VITALS
HEART RATE: 69 BPM | HEIGHT: 66 IN | WEIGHT: 173.6 LBS | TEMPERATURE: 98.4 F | DIASTOLIC BLOOD PRESSURE: 70 MMHG | SYSTOLIC BLOOD PRESSURE: 109 MMHG | BODY MASS INDEX: 27.9 KG/M2 | OXYGEN SATURATION: 98 %

## 2025-03-11 DIAGNOSIS — R42 DIZZINESS: ICD-10-CM

## 2025-03-11 DIAGNOSIS — E78.2 MIXED HYPERLIPIDEMIA: Primary | ICD-10-CM

## 2025-03-11 DIAGNOSIS — R53.83 FATIGUE, UNSPECIFIED TYPE: ICD-10-CM

## 2025-03-11 NOTE — PROGRESS NOTES
Subjective   Екатерина Stovall is a 49 y.o. female.     History of Present Illness  49-year-old female follow-up on dizziness and 6-month check      The following portions of the patient's history were reviewed and updated as appropriate: allergies, current medications, past family history, past medical history, past social history, past surgical history, and problem list.    Review of Systems   Respiratory:  Negative for shortness of breath.    Cardiovascular:  Negative for chest pain and leg swelling.   Neurological:         Head injury 2007-since then negative neurologic workup?  Basilar migraines-3-month history of increasing dizziness usually positional?  Basilar artery disease--MRI of brain negative       Objective   Physical Exam  Vitals and nursing note reviewed.   Constitutional:       Appearance: Normal appearance.   HENT:      Right Ear: Tympanic membrane and ear canal normal.      Left Ear: Tympanic membrane and ear canal normal.      Mouth/Throat:      Mouth: Mucous membranes are moist.   Eyes:      Extraocular Movements: Extraocular movements intact.      Pupils: Pupils are equal, round, and reactive to light.   Neck:      Vascular: No carotid bruit.   Cardiovascular:      Rate and Rhythm: Normal rate and regular rhythm.      Pulses: Normal pulses.      Heart sounds: Normal heart sounds.   Pulmonary:      Effort: Pulmonary effort is normal.      Breath sounds: Normal breath sounds.   Musculoskeletal:      Cervical back: No tenderness.      Right lower leg: No edema.      Left lower leg: No edema.   Skin:     General: Skin is warm and dry.   Neurological:      General: No focal deficit present.      Mental Status: She is alert and oriented to person, place, and time.   Psychiatric:         Mood and Affect: Mood normal.         Behavior: Behavior normal.         Thought Content: Thought content normal.         Judgment: Judgment normal.         Assessment & Plan   Diagnoses and all orders for this  visit:    1. Mixed hyperlipidemia (Primary)  -     Comprehensive Metabolic Panel  -     Lipid Panel    2. Fatigue, unspecified type  -     Comprehensive Metabolic Panel  -     TSH  -     CBC & Differential    3. Dizziness  -     Ambulatory Referral to ENT (Otolaryngology)       Plan above-MRI brain negative

## 2025-03-12 LAB
ALBUMIN SERPL-MCNC: 4.6 G/DL (ref 3.5–5.2)
ALBUMIN/GLOB SERPL: 2.2 G/DL
ALP SERPL-CCNC: 79 U/L (ref 39–117)
ALT SERPL-CCNC: 38 U/L (ref 1–33)
AST SERPL-CCNC: 32 U/L (ref 1–32)
BASOPHILS # BLD AUTO: 0.05 10*3/MM3 (ref 0–0.2)
BASOPHILS NFR BLD AUTO: 1.3 % (ref 0–1.5)
BILIRUB SERPL-MCNC: 0.8 MG/DL (ref 0–1.2)
BUN SERPL-MCNC: 16 MG/DL (ref 6–20)
BUN/CREAT SERPL: 18.8 (ref 7–25)
CALCIUM SERPL-MCNC: 10.1 MG/DL (ref 8.6–10.5)
CHLORIDE SERPL-SCNC: 102 MMOL/L (ref 98–107)
CHOLEST SERPL-MCNC: 193 MG/DL (ref 0–200)
CO2 SERPL-SCNC: 27.3 MMOL/L (ref 22–29)
CREAT SERPL-MCNC: 0.85 MG/DL (ref 0.57–1)
EGFRCR SERPLBLD CKD-EPI 2021: 83.6 ML/MIN/1.73
EOSINOPHIL # BLD AUTO: 0.05 10*3/MM3 (ref 0–0.4)
EOSINOPHIL NFR BLD AUTO: 1.3 % (ref 0.3–6.2)
ERYTHROCYTE [DISTWIDTH] IN BLOOD BY AUTOMATED COUNT: 13 % (ref 12.3–15.4)
GLOBULIN SER CALC-MCNC: 2.1 GM/DL
GLUCOSE SERPL-MCNC: 84 MG/DL (ref 65–99)
HCT VFR BLD AUTO: 43.4 % (ref 34–46.6)
HDLC SERPL-MCNC: 43 MG/DL (ref 40–60)
HGB BLD-MCNC: 14.7 G/DL (ref 12–15.9)
IMM GRANULOCYTES # BLD AUTO: 0.01 10*3/MM3 (ref 0–0.05)
IMM GRANULOCYTES NFR BLD AUTO: 0.3 % (ref 0–0.5)
LDLC SERPL CALC-MCNC: 132 MG/DL (ref 0–100)
LYMPHOCYTES # BLD AUTO: 1.11 10*3/MM3 (ref 0.7–3.1)
LYMPHOCYTES NFR BLD AUTO: 29 % (ref 19.6–45.3)
MCH RBC QN AUTO: 30.1 PG (ref 26.6–33)
MCHC RBC AUTO-ENTMCNC: 33.9 G/DL (ref 31.5–35.7)
MCV RBC AUTO: 88.8 FL (ref 79–97)
MONOCYTES # BLD AUTO: 0.41 10*3/MM3 (ref 0.1–0.9)
MONOCYTES NFR BLD AUTO: 10.7 % (ref 5–12)
NEUTROPHILS # BLD AUTO: 2.2 10*3/MM3 (ref 1.7–7)
NEUTROPHILS NFR BLD AUTO: 57.4 % (ref 42.7–76)
NRBC BLD AUTO-RTO: 0 /100 WBC (ref 0–0.2)
PLATELET # BLD AUTO: 313 10*3/MM3 (ref 140–450)
POTASSIUM SERPL-SCNC: 4.5 MMOL/L (ref 3.5–5.2)
PROT SERPL-MCNC: 6.7 G/DL (ref 6–8.5)
RBC # BLD AUTO: 4.89 10*6/MM3 (ref 3.77–5.28)
SODIUM SERPL-SCNC: 141 MMOL/L (ref 136–145)
TRIGL SERPL-MCNC: 97 MG/DL (ref 0–150)
TSH SERPL DL<=0.005 MIU/L-ACNC: 2.5 UIU/ML (ref 0.27–4.2)
VLDLC SERPL CALC-MCNC: 18 MG/DL (ref 5–40)
WBC # BLD AUTO: 3.83 10*3/MM3 (ref 3.4–10.8)

## 2025-03-13 DIAGNOSIS — E78.2 MIXED HYPERLIPIDEMIA: Primary | ICD-10-CM

## 2025-03-13 RX ORDER — PRAVASTATIN SODIUM 40 MG
40 TABLET ORAL NIGHTLY
Qty: 90 TABLET | Refills: 0 | Status: SHIPPED | OUTPATIENT
Start: 2025-03-13

## 2025-04-08 ENCOUNTER — PROCEDURE VISIT (OUTPATIENT)
Dept: OTOLARYNGOLOGY | Facility: CLINIC | Age: 50
End: 2025-04-08
Payer: COMMERCIAL

## 2025-04-08 ENCOUNTER — OFFICE VISIT (OUTPATIENT)
Dept: OTOLARYNGOLOGY | Facility: CLINIC | Age: 50
End: 2025-04-08
Payer: COMMERCIAL

## 2025-04-08 VITALS
WEIGHT: 172 LBS | SYSTOLIC BLOOD PRESSURE: 101 MMHG | HEIGHT: 66 IN | BODY MASS INDEX: 27.64 KG/M2 | DIASTOLIC BLOOD PRESSURE: 83 MMHG

## 2025-04-08 DIAGNOSIS — Z01.10 HEARING WITHIN NORMAL LIMITS IN BOTH EARS: ICD-10-CM

## 2025-04-08 DIAGNOSIS — R42 VERTIGO: Primary | ICD-10-CM

## 2025-04-08 DIAGNOSIS — R42 DIZZINESS: Primary | ICD-10-CM

## 2025-04-08 NOTE — PROGRESS NOTES
BALDO Lala  [facility]  [address], [address]  [place] [address]  Fax [phone]  Phone [phone]      Visit Type: NEW PATIENT   Chief Complaint   Patient presents with    Dizziness           HISTORY OBTAINED FROM: patient  HPI  Екатерина Stovall is a  49 y.o. female who complains of dizziness. The symptoms are not localized to a particular location. The patient has had moderate symptoms. The symptoms started when she was sick with a virus.  The symptoms have been present: since January. The typical spell usually lasts for : minutes Recently, the symptom frequency has been: occurring several times a desmond change in head position, standing up, bending over, and looking up. The symptoms are improved with : anti-vertigo medications. She has been using meclizine but it is too sedating. She has had an audiogram today.  MRI brain was normal.        Past Medical History:   Diagnosis Date    Allergic 2011    Cats,horses,dogs, trees and seasonal allergies    Anxiety     Arrhythmia     COVID-19 07/2022    Hx of adenomatous polyp of colon 2024    Hyperlipidemia     Migraine     Seasonal allergies        Past Surgical History:   Procedure Laterality Date    APPENDECTOMY      ARM LESION/CYST EXCISION Left 01/21/2022    Procedure: LEFT RING FINGER NAILBED LESION EXCISIONAL BIOPSY;  Surgeon: Harry Holt MD;  Location: [facility] OR;  Service: Plastics;  Laterality: Left;    COLONOSCOPY      COLONOSCOPY N/A 3/13/2024    Procedure: COLONOSCOPY WITH ANESTHESIA;  Surgeon: Tara Roblero MD;  Location: [facility] ENDOSCOPY;  Service: Gastroenterology;  Laterality: N/A;  pre: screen  post:  polyp  settle    FINGER MASS EXCISION      LAPAROSCOPIC SALPINGOOPHERECTOMY Bilateral     OOPHORECTOMY      TOTAL ABDOMINAL HYSTERECTOMY      RIVKA w/ BSO due to pain caused by varicose veins and heavy bleeding.    TUBAL ABDOMINAL LIGATION         Family History:  Her family history includes Arthritis in her mother; Asthma in her mother; Breast cancer in her maternal aunt, maternal grandmother, and paternal grandmother; COPD in her mother; Cancer in her maternal aunt, maternal aunt, maternal grandmother, and mother; Depression in her mother; Diabetes in her father; Heart disease in her father; Hyperlipidemia in her father; Hypertension in her father; Ovarian cancer in her mother; Thyroid disease in her mother; Uterine cancer in her maternal aunt.     Social History: She  reports that she has never smoked. She has been exposed to tobacco smoke. She has never used smokeless tobacco. She reports that she does not currently use alcohol. She reports that she does not use drugs.    Home Medications:  Align, Black Cohosh-SoyIsoflav-C Quad, Inulin, Liraglutide, Rhubarb, escitalopram, levocetirizine, meclizine, montelukast, pravastatin, and sulfacetamide    Allergies:  She has no known allergies.       Vital Signs:   BP: (101)/(83) 101/83  ENT Physical Exam  Constitutional  Appearance: patient appears well-developed,  Communication/Voice: communication appropriate for developmental age;  Head and Face  Appearance: head appears normal and face appears normal;  Ear  Hearing: intact;  Auricles: right auricle normal; left auricle normal;  External Mastoids: right external mastoid normal; left external mastoid normal;  Ear Canals: right ear canal normal; left ear canal normal;  Tympanic Membranes: right tympanic membrane normal; left tympanic membrane normal;  Nose  External Nose: nares patent bilaterally; external nose normal;  Internal Nose: nasal mucosa normal; septum normal; bilateral inferior turbinates normal;  Oral Cavity/Oropharynx  Lips: normal;  Teeth: normal;  Gums: gingiva normal;  Tongue: normal;  Oral mucosa: normal;  Hard palate: normal;  Soft palate: normal;  Tonsils: normal;  Base of Tongue: normal;  Posterior pharyngeal wall: normal;  Neck  Neck: neck  normal;  Respiratory  Inspection: breathing unlabored;  Cardiovascular  Inspection: extremities are warm and well perfused;  Neurovestibular  Mental Status: alert and oriented;  Psychiatric: mood normal; affect is appropriate;           Result Review       RESULTS REVIEW    I have reviewed the patients old records in the chart.    MRI Brain Without Contrast (02/04/2025 09:52)     Procedure visit with Sara Johansen AUD (04/08/2025)            Assessment & Plan  Vertigo         Patient to have epley and césar delgado pike on Friday. Will decrease meclizine to half a tablet at night. Will follow up on Friday.           Electronically signed by BALDO Lala 04/08/25 12:58 PM CDT.

## 2025-04-08 NOTE — PROGRESS NOTES
AUDIOMETRIC EVALUATION      Name:  Екатерина Stovall  :  1975  Age:  49 y.o.  Date of Evaluation:  2025       History:  Ms. Stovall is seen today for a hearing evaluation due to dizziness at the request of BALDO Maria.    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  Family history of hearing loss: many family members are hard of hearing  Head trauma requiring hospital stay: No  Chemotherapy: No  Other significant history: None    Vestibular Information:  Duration: Minutes  Triggers: laying down to sitting up, elevators, rolling from left to right in bed, looking up  Heart Problems: No  Back/Neck Problems: some neck stiffness  Vision Problems: None- family has noticed some eye issues  Nausea: Yes  Weakness/Numbness: No  Motion Sickness: No  Migraine/Headache: Yes - sometimes headache from being dizzy  Falls: No    **Case history obtained in office and through EMR system      EVALUATION:        RESULTS:    Otoscopic Evaluation:  Right: clear canal, tympanic membrane visualized  Left: clear canal, tympanic membrane visualized    Tympanometry (226 Hz):  Right: Type A  Left: Type A    Pure Tone Audiometry:    Right: Normal hearing thresholds   Left: Normal hearing thresholds     Speech Audiometry:   Right: Speech Reception Threshold (SRT) was obtained at 15 dB HL  Word Recognition scores - Excellent (100)% at 55 dB, using NU-6 List 1A with 10 words  Left: Speech Reception Threshold (SRT) was obtained at 15 dB HL  Word Recognition scores - Excellent (100)% at 55 dB, using NU-6 List 1A with 10 words  SRT/PTA in good agreement bilaterally.        IMPRESSIONS:  Tympanometry showed normal middle ear pressure and static compliance, consistent with normal middle ear function for  both ears. Pure tone thresholds for both ears show normal hearing, suggesting normal outer/middle ear function and normal cochlear/retrocochlear function. Patient was counseled with regard to the findings.      Diagnosis:  1. Dizziness    2. Hearing within normal limits in both ears         RECOMMENDATIONS/PLAN:  Follow-up recommendations per BALDO Maria.  Practice good communication strategies to assist with everyday listening (eye contact with speakers, reduce background noise, encourage others to communicate clearly and slowly).  Consistent utilization of hearing protection devices in noisy environments.  Further vestibular assessment in order to definitively rule out vestibular pathology  Discussed results and recommendations with patient. Questions were addressed and the patient was encouraged to contact our department should concerns arise.        Sara Davis, CCC-A, F-AAA  Doctor of Audiology

## 2025-04-11 ENCOUNTER — OFFICE VISIT (OUTPATIENT)
Dept: OTOLARYNGOLOGY | Facility: CLINIC | Age: 50
End: 2025-04-11
Payer: COMMERCIAL

## 2025-04-11 ENCOUNTER — PROCEDURE VISIT (OUTPATIENT)
Dept: OTOLARYNGOLOGY | Facility: CLINIC | Age: 50
End: 2025-04-11
Payer: COMMERCIAL

## 2025-04-11 VITALS — WEIGHT: 172 LBS | HEIGHT: 66 IN | BODY MASS INDEX: 27.64 KG/M2

## 2025-04-11 DIAGNOSIS — M54.12 CERVICAL RADICULOPATHY, ACUTE: ICD-10-CM

## 2025-04-11 DIAGNOSIS — R26.89 IMBALANCE: ICD-10-CM

## 2025-04-11 DIAGNOSIS — R42 DIZZINESS: Primary | ICD-10-CM

## 2025-04-11 DIAGNOSIS — R42 DIZZINESS: Primary | Chronic | ICD-10-CM

## 2025-04-11 DIAGNOSIS — S09.90XD ABUSIVE HEAD TRAUMA, SUBSEQUENT ENCOUNTER: Chronic | ICD-10-CM

## 2025-04-11 DIAGNOSIS — S09.90XD TRAUMATIC INJURY OF HEAD, SUBSEQUENT ENCOUNTER: Chronic | ICD-10-CM

## 2025-04-11 PROBLEM — S09.90XA ABUSIVE HEAD TRAUMA: Status: ACTIVE | Noted: 2025-04-11

## 2025-04-11 NOTE — PROGRESS NOTES
AUDIOMETRIC EVALUATION      Name:  Екатерина Stovall  :  1975  Age:  49 y.o.  Date of Evaluation:  2025       History:  Ms. Stovall is seen today for a vestibular evaluation at the request of BALDO Maria.    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- with hearing protection  Family history of hearing loss: many family members are hard of hearing  Other significant history: None    Vestibular Information:  Duration: Minutes  Triggers: laying down to sitting up, elevators, rolling from left to right in bed, looking up  Heart Problems: No  Back/Neck Problems: some neck stiffness  Vision Problems: None- family has noticed some eye issues  Nausea: Yes  Weakness/Numbness: No  Motion Sickness: No  Migraine/Headache: Yes - sometimes headache from being dizzy  Falls: No    **Case history obtained in office and through EMR system      EVALUATION:        RESULTS:      Spontaneous Nystagmus Normal - No nystagmus observed or recorded   Ally-Hallpike Negative   Left No nystagmus observed or recorded   Right No nystagmus observed or recorded   Positional Normal   Supine No nystagmus observed or recorded   Head Right No nystagmus observed or recorded   Head Left No nystagmus observed or recorded       IMPRESSIONS:    Patient was counseled with regard to the findings and aftercare instructions.      Diagnosis:  1. Dizziness         RECOMMENDATIONS/PLAN:  Follow-up recommendations per BALDO Rogel.  Discussed results and recommendations with patient. Questions were addressed and the patient was encouraged to contact our department should concerns arise.        Sara Davis, CCC-A, F-AAA  Doctor of Audiology
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
## 2025-04-21 DIAGNOSIS — E66.3 OVERWEIGHT WITH BODY MASS INDEX (BMI) OF 28 TO 28.9 IN ADULT: ICD-10-CM

## 2025-04-21 RX ORDER — LIRAGLUTIDE 6 MG/ML
3 INJECTION, SOLUTION SUBCUTANEOUS DAILY
Qty: 3 ML | Refills: 3 | Status: SHIPPED | OUTPATIENT
Start: 2025-04-21

## 2025-04-21 RX ORDER — LIRAGLUTIDE 6 MG/ML
3 INJECTION, SOLUTION SUBCUTANEOUS DAILY
Qty: 3 ML | Refills: 3 | Status: SHIPPED | OUTPATIENT
Start: 2025-04-21 | End: 2025-04-21 | Stop reason: SDUPTHER

## 2025-04-30 ENCOUNTER — HOSPITAL ENCOUNTER (OUTPATIENT)
Dept: MRI IMAGING | Facility: HOSPITAL | Age: 50
Discharge: HOME | End: 2025-04-30
Admitting: NURSE PRACTITIONER
Payer: COMMERCIAL

## 2025-04-30 ENCOUNTER — RESULTS FOLLOW-UP (OUTPATIENT)
Dept: OTOLARYNGOLOGY | Facility: CLINIC | Age: 50
End: 2025-04-30
Payer: COMMERCIAL

## 2025-04-30 DIAGNOSIS — M54.12 CERVICAL RADICULOPATHY, ACUTE: ICD-10-CM

## 2025-04-30 PROCEDURE — 72141 MRI NECK SPINE W/O DYE: CPT

## 2025-04-30 NOTE — TELEPHONE ENCOUNTER
----- Message from Yamilet Vela sent at 4/30/2025  1:21 PM CDT -----  Age related changes of the spine with small spur at C34 with mild impingement on right nerve. Could cause some right sided neck pain and arm discomfort or she may have no s/s at all. Nothing surgical  ----- Message -----  From: Interface, [facility] [place] [facility]  Sent: 4/30/2025  12:04 PM CDT  To: Yamilet Vela, BALDO

## 2025-04-30 NOTE — TELEPHONE ENCOUNTER
"Nurse contacted patient and reported the results per BALDO Woodard, \"Age related changes of the spine with small spur at C34 with mild impingement on right nerve. Could cause some right sided neck pain and arm discomfort or she may have no s/s at all. Nothing surgical.\" Patient has no questions at this time.  "

## 2025-05-09 ENCOUNTER — OFFICE VISIT (OUTPATIENT)
Dept: FAMILY MEDICINE CLINIC | Facility: CLINIC | Age: 50
End: 2025-05-09
Payer: COMMERCIAL

## 2025-05-09 VITALS
HEIGHT: 66 IN | BODY MASS INDEX: 27.8 KG/M2 | DIASTOLIC BLOOD PRESSURE: 66 MMHG | RESPIRATION RATE: 18 BRPM | TEMPERATURE: 97.5 F | OXYGEN SATURATION: 97 % | HEART RATE: 72 BPM | SYSTOLIC BLOOD PRESSURE: 122 MMHG | WEIGHT: 173 LBS

## 2025-05-09 DIAGNOSIS — R49.0 HOARSENESS: ICD-10-CM

## 2025-05-09 DIAGNOSIS — J01.40 ACUTE PANSINUSITIS, RECURRENCE NOT SPECIFIED: Primary | ICD-10-CM

## 2025-05-09 PROCEDURE — 99213 OFFICE O/P EST LOW 20 MIN: CPT | Performed by: NURSE PRACTITIONER

## 2025-05-09 RX ORDER — METHYLPREDNISOLONE 4 MG/1
TABLET ORAL
Qty: 21 TABLET | Refills: 0 | Status: SHIPPED | OUTPATIENT
Start: 2025-05-09

## 2025-05-09 RX ORDER — TRIAMCINOLONE ACETONIDE 40 MG/ML
40 INJECTION, SUSPENSION INTRA-ARTICULAR; INTRAMUSCULAR ONCE
Status: SHIPPED | OUTPATIENT
Start: 2025-05-09

## 2025-05-09 NOTE — PROGRESS NOTES
CC:   Chief Complaint   Patient presents with    Hoarse    sinus drainage     X4 days.  Home covid test negative        History:  Екатерина Stovall is a 49 y.o. female who presents today for evaluation of the above problems.      HPI  Patient presents with illness.  Symptoms started about 4 days ago.  Patient has problems with allergies and takes Singulair, Xyzal and over-the-counter medications for symptoms.  Reports she became very hoarse yesterday and symptoms seem to be worsening.  She is flying out to go on a trip on Sunday.  She took a at home COVID test and negative.  Afebrile.  Mainly complains of sinus drainage and pressure.    No Known Allergies  Past Medical History:   Diagnosis Date    Allergic 2011    Cats,horses,dogs, trees and seasonal allergies    Anxiety     Arrhythmia     COVID-19 07/2022    Hx of adenomatous polyp of colon 2024    Hyperlipidemia     Migraine     Seasonal allergies      Past Surgical History:   Procedure Laterality Date    APPENDECTOMY      ARM LESION/CYST EXCISION Left 01/21/2022    Procedure: LEFT RING FINGER NAILBED LESION EXCISIONAL BIOPSY;  Surgeon: Harry Holt MD;  Location: [facility] OR;  Service: Plastics;  Laterality: Left;    COLONOSCOPY      COLONOSCOPY N/A 3/13/2024    Procedure: COLONOSCOPY WITH ANESTHESIA;  Surgeon: Tara Roblero MD;  Location: [facility] ENDOSCOPY;  Service: Gastroenterology;  Laterality: N/A;  pre: screen  post:  polyp  settle    FINGER MASS EXCISION      LAPAROSCOPIC SALPINGOOPHERECTOMY Bilateral     OOPHORECTOMY      TOTAL ABDOMINAL HYSTERECTOMY      RIVKA w/ BSO due to pain caused by varicose veins and heavy bleeding.    TUBAL ABDOMINAL LIGATION       Family History   Problem Relation Age of Onset    Ovarian cancer Mother     Arthritis Mother     Asthma Mother     Cancer Mother     Thyroid disease Mother     COPD Mother     Depression Mother     Heart disease Father     Hyperlipidemia Father     Diabetes Father     Hypertension Father     Uterine  cancer Maternal Aunt     Breast cancer Maternal Aunt     Cancer Maternal Aunt     Cancer Maternal Aunt     Breast cancer Maternal Grandmother     Cancer Maternal Grandmother     Breast cancer Paternal Grandmother     Colon cancer Neg Hx     Melanoma Neg Hx     Colon polyps Neg Hx     Esophageal cancer Neg Hx     Liver cancer Neg Hx     Stomach cancer Neg Hx     Rectal cancer Neg Hx     Liver disease Neg Hx       reports that she has never smoked. She has been exposed to tobacco smoke. She has never used smokeless tobacco. She reports that she does not currently use alcohol. She reports that she does not use drugs.      Current Outpatient Medications:     Black Cohosh-SoyIsoflav-C Quad 40- MG capsule, , Disp: , Rfl:     escitalopram (LEXAPRO) 10 MG tablet, Take 1 tablet by mouth Daily., Disp: 90 tablet, Rfl: 3    Inulin (Fiber Choice) 1.5 g chewable tablet, Chew 2 tablets Daily., Disp: , Rfl:     levocetirizine (XYZAL) 5 MG tablet, Take 1 tablet by mouth Every Evening., Disp: 90 tablet, Rfl: 3    Liraglutide (Saxenda) 18 MG/3ML injection pen, Inject 3 mg under the skin into the appropriate area as directed Daily., Disp: 3 mL, Rfl: 3    meclizine (ANTIVERT) 12.5 MG tablet, 1-2 every 6-8 hours for vertigo, Disp: 60 tablet, Rfl: 1    montelukast (SINGULAIR) 10 MG tablet, Take 1 tablet by mouth every night at bedtime., Disp: 90 tablet, Rfl: 3    pravastatin (Pravachol) 40 MG tablet, Take 1 tablet by mouth Every Night., Disp: 90 tablet, Rfl: 0    Probiotic Product (Align) 4 MG capsule, Take 2 tablets by mouth Daily., Disp: , Rfl:     Rhubarb (ESTROVEN COMPLETE PO), Take  by mouth., Disp: , Rfl:     sulfacetamide (BLEPH-10) 10 % ophthalmic solution, Administer 1 drop to both eyes Every 3 (Three) Hours., Disp: 10 mL, Rfl: 0    amoxicillin-clavulanate (AUGMENTIN) 875-125 MG per tablet, Take 1 tablet by mouth 2 (Two) Times a Day for 7 days., Disp: 14 tablet, Rfl: 0    methylPREDNISolone (MEDROL) 4 MG dose pack, Take as  "directed on package instructions., Disp: 21 tablet, Rfl: 0    Current Facility-Administered Medications:     triamcinolone acetonide (KENALOG-40) injection 40 mg, 40 mg, Intramuscular, Once, Oralia Santamaria APRN    OBJECTIVE:  /66 (BP Location: Left arm, Patient Position: Sitting, Cuff Size: Adult)   Pulse 72   Temp 97.5 °F (36.4 °C) (Temporal)   Resp 18   Ht 167.6 cm (66\")   Wt 78.5 kg (173 lb)   SpO2 97%   BMI 27.92 kg/m²    Estimated body mass index is 27.92 kg/m² as calculated from the following:    Height as of this encounter: 167.6 cm (66\").    Weight as of this encounter: 78.5 kg (173 lb).                  Physical Exam  Vitals reviewed.   Constitutional:       General: She is not in acute distress.     Appearance: Normal appearance.   HENT:      Head: Normocephalic and atraumatic.      Right Ear: Tympanic membrane, ear canal and external ear normal.      Left Ear: Tympanic membrane, ear canal and external ear normal.      Mouth/Throat:      Mouth: Mucous membranes are moist.      Pharynx: Oropharynx is clear.   Cardiovascular:      Rate and Rhythm: Normal rate and regular rhythm.      Heart sounds: Normal heart sounds.   Pulmonary:      Effort: No respiratory distress.      Breath sounds: Normal breath sounds. No wheezing.   Musculoskeletal:         General: Normal range of motion.      Cervical back: Normal range of motion.   Lymphadenopathy:      Cervical: No cervical adenopathy.   Skin:     General: Skin is warm and dry.   Neurological:      Mental Status: She is alert and oriented to person, place, and time.   Psychiatric:         Mood and Affect: Mood normal.         Behavior: Behavior normal.              Assessment/Plan    Diagnoses and all orders for this visit:    1. Acute pansinusitis, recurrence not specified (Primary)  -     triamcinolone acetonide (KENALOG-40) injection 40 mg  -     methylPREDNISolone (MEDROL) 4 MG dose pack; Take as directed on package instructions.  Dispense: 21 " tablet; Refill: 0  -     amoxicillin-clavulanate (AUGMENTIN) 875-125 MG per tablet; Take 1 tablet by mouth 2 (Two) Times a Day for 7 days.  Dispense: 14 tablet; Refill: 0    2. Hoarseness    Medication side effects discussed.  Steroid injection today to help with sinus inflammation and symptoms.  Can start steroid Dosepak tomorrow.  If no better or worse in 2 to 3 days-patient will have antibiotic to take if needed.  Continue medications for symptoms.  If worse or no better in 1 week-follow-up            An After Visit Summary was printed and given to the patient at discharge.  Return if symptoms worsen or fail to improve.

## 2025-06-19 ENCOUNTER — LAB (OUTPATIENT)
Dept: FAMILY MEDICINE CLINIC | Facility: CLINIC | Age: 50
End: 2025-06-19
Payer: COMMERCIAL

## 2025-06-19 DIAGNOSIS — E78.2 MIXED HYPERLIPIDEMIA: Primary | ICD-10-CM

## 2025-06-20 LAB
ALBUMIN SERPL-MCNC: 4.6 G/DL (ref 3.5–5.2)
ALBUMIN/GLOB SERPL: 2.6 G/DL
ALP SERPL-CCNC: 73 U/L (ref 39–117)
ALT SERPL-CCNC: 26 U/L (ref 1–33)
AST SERPL-CCNC: 22 U/L (ref 1–32)
BILIRUB SERPL-MCNC: 0.9 MG/DL (ref 0–1.2)
BUN SERPL-MCNC: 14 MG/DL (ref 6–20)
BUN/CREAT SERPL: 17.7 (ref 7–25)
CALCIUM SERPL-MCNC: 10.2 MG/DL (ref 8.6–10.5)
CHLORIDE SERPL-SCNC: 103 MMOL/L (ref 98–107)
CHOLEST SERPL-MCNC: 192 MG/DL (ref 0–200)
CO2 SERPL-SCNC: 29.7 MMOL/L (ref 22–29)
CREAT SERPL-MCNC: 0.79 MG/DL (ref 0.57–1)
EGFRCR SERPLBLD CKD-EPI 2021: 91.3 ML/MIN/1.73
GLOBULIN SER CALC-MCNC: 1.8 GM/DL
GLUCOSE SERPL-MCNC: 75 MG/DL (ref 65–99)
HDLC SERPL-MCNC: 52 MG/DL (ref 40–60)
LDLC SERPL CALC-MCNC: 124 MG/DL (ref 0–100)
POTASSIUM SERPL-SCNC: 4.3 MMOL/L (ref 3.5–5.2)
PROT SERPL-MCNC: 6.4 G/DL (ref 6–8.5)
SODIUM SERPL-SCNC: 143 MMOL/L (ref 136–145)
TRIGL SERPL-MCNC: 88 MG/DL (ref 0–150)
VLDLC SERPL CALC-MCNC: 16 MG/DL (ref 5–40)

## 2025-07-07 ENCOUNTER — OFFICE VISIT (OUTPATIENT)
Dept: OTOLARYNGOLOGY | Facility: CLINIC | Age: 50
End: 2025-07-07
Payer: COMMERCIAL

## 2025-07-07 VITALS
TEMPERATURE: 97.3 F | BODY MASS INDEX: 27.13 KG/M2 | DIASTOLIC BLOOD PRESSURE: 67 MMHG | WEIGHT: 168.8 LBS | HEART RATE: 59 BPM | HEIGHT: 66 IN | SYSTOLIC BLOOD PRESSURE: 123 MMHG

## 2025-07-07 DIAGNOSIS — R42 VERTIGO: Primary | Chronic | ICD-10-CM

## 2025-07-07 PROCEDURE — 99213 OFFICE O/P EST LOW 20 MIN: CPT | Performed by: NURSE PRACTITIONER

## 2025-07-07 RX ORDER — LANCETS 33 GAUGE
EACH MISCELLANEOUS
COMMUNITY
Start: 2025-05-23

## 2025-07-07 NOTE — PROGRESS NOTES
BALDO Lala  [facility] EAR NOSE & THROAT  [address], [address]  [place] [phone]  Fax [phone]  Phone [phone]      Visit Type: FOLLOW UP   Chief Complaint   Patient presents with    Follow-up     Patient here for 3 month follow-up for vertigo. Patient denies concerns.           HISTORY OBTAINED FROM: patient  HPI  She presents for a follow up evaluation. She has had continued problems with dizziness. The symptoms are not localized to a particular location. The symptoms severity was described as : moderate The symptoms have been : present for the last several months There have been no identified factors that aggravate the symptoms. There have been no factors that have improved the symptoms. The patient has had MRI brain, MRI cervical spine, and césar delgado pike/audiogram which was negative. The patient states vestibular rehab did not help. The patient states she is still having spells where she will fall out or spells where she cannot hear or see.     Past Medical History:   Diagnosis Date    Allergic 2011    Cats,horses,dogs, trees and seasonal allergies    Anxiety     Arrhythmia     COVID-19 07/2022    Dizziness Jan 4 2025    Hx of adenomatous polyp of colon 2024    Hyperlipidemia     Migraine     Multiple gestation     Nosebleed     Ovarian cyst 10/2001    Seasonal allergies     Varicella        Past Surgical History:   Procedure Laterality Date    APPENDECTOMY      ARM LESION/CYST EXCISION Left 01/21/2022    Procedure: LEFT RING FINGER NAILBED LESION EXCISIONAL BIOPSY;  Surgeon: Harry Holt MD;  Location: [facility] OR;  Service: Plastics;  Laterality: Left;    COLONOSCOPY      COLONOSCOPY N/A 03/13/2024    Procedure: COLONOSCOPY WITH ANESTHESIA;  Surgeon: Tara Roblero MD;  Location: [facility] ENDOSCOPY;  Service: Gastroenterology;  Laterality: N/A;  pre: screen  post:  polyp  settle    FINGER MASS EXCISION      LAPAROSCOPIC  SALPINGOOPHERECTOMY Bilateral     OOPHORECTOMY      TOTAL ABDOMINAL HYSTERECTOMY      RIVKA w/ BSO due to pain caused by varicose veins and heavy bleeding.    TUBAL ABDOMINAL LIGATION      WISDOM TOOTH EXTRACTION         Family History: Her family history includes Arthritis in her mother; Asthma in her mother; Breast cancer in her maternal aunt, maternal aunt, maternal grandmother, and paternal grandmother; COPD in her mother; Cancer in her maternal aunt, maternal aunt, maternal grandmother, and mother; Colon cancer in her maternal uncle; Coronary artery disease in her father; Dementia in her maternal grandmother; Depression in her mother; Diabetes in her father; Heart disease in her father; Hyperlipidemia in her father; Hypertension in her father; Ovarian cancer in her mother; Stroke in her father; Thyroid disease in her mother; Uterine cancer in her maternal aunt.     Social History: She  reports that she has never smoked. She has been exposed to tobacco smoke. She has never used smokeless tobacco. She reports that she does not currently use alcohol. She reports that she does not use drugs.    Home Medications:  Align, Black Cohosh-SoyIsoflav-C Quad, Insulin Pen Needle, Inulin, Liraglutide, atorvastatin, escitalopram, levocetirizine, meclizine, montelukast, and sulfacetamide    Allergies:  She has no known allergies.       Vital Signs:   Temp:  [97.3 °F (36.3 °C)] 97.3 °F (36.3 °C)  Heart Rate:  [59] 59  BP: (123)/(67) 123/67  ENT Physical Exam  Constitutional  Appearance: patient appears well-developed,  Communication/Voice: communication appropriate for developmental age;  Head and Face  Appearance: head appears normal, face appears normal and face appears atraumatic;  Palpation: facial palpation normal;  Salivary: glands normal;  Ear  Hearing: intact;  Auricles: right auricle normal; left auricle normal;  External Mastoids: right external mastoid normal; left external mastoid normal;  Ear Canals: right ear canal  normal; left ear canal normal;  Tympanic Membranes: right tympanic membrane normal; left tympanic membrane normal;  Nose  External Nose: nares patent bilaterally; external nose normal;  Internal Nose: nasal mucosa normal; septum normal; bilateral inferior turbinates normal;  Oral Cavity/Oropharynx  Lips: normal;  Teeth: normal;  Gums: gingiva normal;  Tongue: normal;  Oral mucosa: normal;  Hard palate: normal;  Soft palate: normal;  Tonsils: normal;  Base of Tongue: normal;  Posterior pharyngeal wall: normal;  Neck  Neck: neck normal;  Respiratory  Inspection: breathing unlabored;  Cardiovascular  Inspection: extremities are warm and well perfused;  Neurovestibular  Mental Status: alert and oriented;  Psychiatric: mood normal; affect is appropriate;  Neurological comments: Pupils equal and reactive, sensitivity to light           Result Review       RESULTS REVIEW    I have reviewed the patients old records in the chart.    MRI Cervical Spine Without Contrast (04/30/2025 09:49)     MRI Brain Without Contrast (02/04/2025 09:52)     DISCHARGE SUMMARY - SCAN - [address] (04/29/2025)     Procedure visit with Sara Johansen AUD (04/08/2025)     Procedure visit with Sara Johansen AUD (04/11/2025)            Assessment & Plan  Vertigo         Conservative management. I have discussed that all ENT testing is negative and vestibular rehab did not help. I feel the patient has a neurological issues and discussed this with Dr. Tillman, pcp and neurology. I have advised patient not to drive until seen by neurology. I have advised patient to follow up with pcp this week. We will release her prn.  Return if symptoms worsen or fail to improve.        Electronically signed by BALDO Lala 07/07/25 10:44 AM CDT.

## 2025-07-08 ENCOUNTER — OFFICE VISIT (OUTPATIENT)
Dept: FAMILY MEDICINE CLINIC | Facility: CLINIC | Age: 50
End: 2025-07-08
Payer: COMMERCIAL

## 2025-07-08 ENCOUNTER — TRANSCRIBE ORDERS (OUTPATIENT)
Dept: ADMINISTRATIVE | Facility: HOSPITAL | Age: 50
End: 2025-07-08
Payer: COMMERCIAL

## 2025-07-08 VITALS
HEART RATE: 65 BPM | HEIGHT: 65 IN | TEMPERATURE: 98 F | WEIGHT: 170.2 LBS | RESPIRATION RATE: 18 BRPM | BODY MASS INDEX: 28.36 KG/M2 | SYSTOLIC BLOOD PRESSURE: 120 MMHG | DIASTOLIC BLOOD PRESSURE: 66 MMHG | OXYGEN SATURATION: 97 %

## 2025-07-08 DIAGNOSIS — R42 VERTIGO: ICD-10-CM

## 2025-07-08 DIAGNOSIS — R55 SYNCOPE, UNSPECIFIED SYNCOPE TYPE: ICD-10-CM

## 2025-07-08 DIAGNOSIS — R42 SEVERE DIZZINESS: Primary | ICD-10-CM

## 2025-07-08 DIAGNOSIS — G40.A09 NONINTRACTABLE ABSENCE EPILEPSY WITHOUT STATUS EPILEPTICUS: ICD-10-CM

## 2025-07-08 NOTE — LETTER
July 8, 2025     Patient: Екатерина Stovall   YOB: 1975   Date of Visit: 7/8/2025       To Whom It May Concern:    It is my medical opinion that Екатерина Stovall should not work until released by me.  She has a medical condition that negates her participation in computer activity at this point.         Sincerely,        Fermín Rosas MD    CC: No Recipients

## 2025-07-08 NOTE — PROGRESS NOTES
Subjective   Екатерина Stovall is a 49 y.o. female.     History of Present Illness  49-year-old with continuing episodes of dizziness lightheaded memory loss etc.      The following portions of the patient's history were reviewed and updated as appropriate: allergies, current medications, past family history, past medical history, past social history, past surgical history, and problem list.    Review of Systems   HENT:          ENT workup negative   Respiratory:  Negative for shortness of breath.    Cardiovascular:  Negative for chest pain and leg swelling.   Neurological:  Positive for dizziness and light-headedness.        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   Constitutional:       Appearance: Normal appearance.   HENT:      Right Ear: Tympanic membrane and ear canal normal.      Left Ear: Tympanic membrane and ear canal normal.      Mouth/Throat:      Mouth: Mucous membranes are moist.   Eyes:      Extraocular Movements: Extraocular movements intact.      Pupils: Pupils are equal, round, and reactive to light.   Cardiovascular:      Rate and Rhythm: Normal rate and regular rhythm.   Pulmonary:      Effort: Pulmonary effort is normal.      Breath sounds: Normal breath sounds.   Musculoskeletal:      Right lower leg: No edema.      Left lower leg: No edema.   Skin:     General: Skin is warm and dry.   Neurological:      General: No focal deficit present.      Mental Status: She is alert and oriented to person, place, and time.   Psychiatric:         Mood and Affect: Mood normal.         Behavior: Behavior normal.         Assessment & Plan   Diagnoses and all orders for this visit:    1. Severe dizziness (Primary)  -     EEG; Future  -     EEG    2. Vertigo  -     EEG; Future  -     EEG    3. Syncope, unspecified syncope type  -     EEG; Future  -      EEG    4. Nonintractable absence epilepsy without status epilepticus  -     EEG; Future  -     EEG         Plan above-May do a therapeutic trial of Keppra if it sometime before she sees the neurologist-no work-computer work may be triggering these episodes

## 2025-07-15 ENCOUNTER — HOSPITAL ENCOUNTER (OUTPATIENT)
Dept: NEUROLOGY | Facility: HOSPITAL | Age: 50
Discharge: HOME | End: 2025-07-15
Admitting: FAMILY MEDICINE
Payer: COMMERCIAL

## 2025-07-15 DIAGNOSIS — R55 SYNCOPE, UNSPECIFIED SYNCOPE TYPE: ICD-10-CM

## 2025-07-15 DIAGNOSIS — G40.A09 NONINTRACTABLE ABSENCE EPILEPSY WITHOUT STATUS EPILEPTICUS: ICD-10-CM

## 2025-07-15 DIAGNOSIS — R42 SEVERE DIZZINESS: ICD-10-CM

## 2025-07-15 PROCEDURE — 95812 EEG 41-60 MINUTES: CPT | Performed by: PSYCHIATRY & NEUROLOGY

## 2025-07-15 PROCEDURE — 95819 EEG AWAKE AND ASLEEP: CPT

## 2025-07-16 ENCOUNTER — RESULTS FOLLOW-UP (OUTPATIENT)
Dept: FAMILY MEDICINE CLINIC | Facility: CLINIC | Age: 50
End: 2025-07-16
Payer: COMMERCIAL

## 2025-07-16 DIAGNOSIS — G93.9 LESION OF FRONTAL LOBE OF BRAIN: Primary | ICD-10-CM

## 2025-07-18 ENCOUNTER — HOSPITAL ENCOUNTER (OUTPATIENT)
Dept: CT IMAGING | Facility: HOSPITAL | Age: 50
Discharge: HOME | End: 2025-07-18
Admitting: FAMILY MEDICINE
Payer: COMMERCIAL

## 2025-07-18 DIAGNOSIS — G93.9 LESION OF FRONTAL LOBE OF BRAIN: ICD-10-CM

## 2025-07-18 DIAGNOSIS — B00.1 FEVER BLISTER: Primary | ICD-10-CM

## 2025-07-18 PROCEDURE — 25510000001 IOPAMIDOL PER 1 ML: Performed by: FAMILY MEDICINE

## 2025-07-18 PROCEDURE — 70496 CT ANGIOGRAPHY HEAD: CPT

## 2025-07-18 RX ORDER — ACYCLOVIR 400 MG/1
400 TABLET ORAL 3 TIMES DAILY
Qty: 21 TABLET | Refills: 0 | Status: SHIPPED | OUTPATIENT
Start: 2025-07-18 | End: 2025-07-25

## 2025-07-18 RX ORDER — IOPAMIDOL 755 MG/ML
100 INJECTION, SOLUTION INTRAVASCULAR
Status: COMPLETED | OUTPATIENT
Start: 2025-07-18 | End: 2025-07-18

## 2025-07-18 RX ADMIN — IOPAMIDOL 100 ML: 755 INJECTION, SOLUTION INTRAVENOUS at 11:28

## 2025-08-05 DIAGNOSIS — J30.2 SEASONAL ALLERGIES: ICD-10-CM

## 2025-08-07 RX ORDER — LEVOCETIRIZINE DIHYDROCHLORIDE 5 MG/1
5 TABLET, FILM COATED ORAL EVERY EVENING
Qty: 90 TABLET | Refills: 2 | Status: SHIPPED | OUTPATIENT
Start: 2025-08-07

## 2025-08-13 ENCOUNTER — OFFICE VISIT (OUTPATIENT)
Dept: NEUROLOGY | Facility: CLINIC | Age: 50
End: 2025-08-13
Payer: COMMERCIAL

## 2025-08-13 VITALS
HEART RATE: 76 BPM | BODY MASS INDEX: 28.39 KG/M2 | HEIGHT: 65 IN | SYSTOLIC BLOOD PRESSURE: 110 MMHG | DIASTOLIC BLOOD PRESSURE: 70 MMHG | RESPIRATION RATE: 12 BRPM | WEIGHT: 170.4 LBS

## 2025-08-13 DIAGNOSIS — R56.9 SEIZURE-LIKE ACTIVITY: Primary | ICD-10-CM

## 2025-08-13 RX ORDER — LEVETIRACETAM 250 MG/1
250 TABLET ORAL 2 TIMES DAILY
Qty: 60 TABLET | Refills: 2 | Status: SHIPPED | OUTPATIENT
Start: 2025-08-13

## 2025-08-14 ENCOUNTER — PATIENT ROUNDING (BHMG ONLY) (OUTPATIENT)
Dept: NEUROLOGY | Facility: CLINIC | Age: 50
End: 2025-08-14
Payer: COMMERCIAL

## 2025-08-14 ENCOUNTER — PATIENT MESSAGE (OUTPATIENT)
Dept: NEUROLOGY | Facility: CLINIC | Age: 50
End: 2025-08-14
Payer: COMMERCIAL

## 2025-08-14 ENCOUNTER — TELEPHONE (OUTPATIENT)
Dept: NEUROLOGY | Facility: CLINIC | Age: 50
End: 2025-08-14
Payer: COMMERCIAL

## 2025-08-20 DIAGNOSIS — E66.3 OVERWEIGHT WITH BODY MASS INDEX (BMI) OF 28 TO 28.9 IN ADULT: ICD-10-CM

## 2025-08-20 RX ORDER — LIRAGLUTIDE 6 MG/ML
INJECTION, SOLUTION SUBCUTANEOUS
Qty: 15 ML | Refills: 2 | Status: SHIPPED | OUTPATIENT
Start: 2025-08-20

## (undated) DEVICE — THE SINGLE USE ETRAP – POLYP TRAP IS USED FOR SUCTION RETRIEVAL OF ENDOSCOPICALLY REMOVED POLYPS.: Brand: ETRAP

## (undated) DEVICE — TBG PENCL TELESCP MEGADYNE SMOKE EVAC 10FT

## (undated) DEVICE — SENSR O2 OXIMAX FNGR A/ 18IN NONSTR

## (undated) DEVICE — SPNG GZ STRL 2S 4X4 12PLY

## (undated) DEVICE — SUT MNCRYL 4/0 PS2 27IN UD MCP426H

## (undated) DEVICE — UTILITY MARKER W/MED LABELS: Brand: MEDLINE

## (undated) DEVICE — MAJOR DOUBLE BASIN W/GOWNS II: Brand: MEDLINE INDUSTRIES, INC.

## (undated) DEVICE — PK TURNOVER RM ADV

## (undated) DEVICE — 3M™ STERI-STRIP™ REINFORCED ADHESIVE SKIN CLOSURES, R1546, 1/4 IN X 4 IN (6 MM X 100 MM), 10 STRIPS/ENVELOPE: Brand: 3M™ STERI-STRIP™

## (undated) DEVICE — MARKR SKIN W/RULR AND LBL

## (undated) DEVICE — TRAP FLD MINIVAC MEGADYNE 100ML

## (undated) DEVICE — GLV SURG BIOGEL LTX PF 8

## (undated) DEVICE — YANKAUER,BULB TIP WITH VENT: Brand: ARGYLE

## (undated) DEVICE — ANTIBACTERIAL UNDYED BRAIDED (POLYGLACTIN 910), SYNTHETIC ABSORBABLE SUTURE: Brand: COATED VICRYL

## (undated) DEVICE — SNAR POLYP CAPTIVATOR RND STFF 2.4 240CM 10MM 1P/U

## (undated) DEVICE — DRAPE,HAND,STERILE: Brand: MEDLINE

## (undated) DEVICE — APPL CHLORAPREP HI/LITE 26ML ORNG

## (undated) DEVICE — PROXIMATE RH ROTATING HEAD SKIN STAPLERS (35 REGULAR) CONTAINS 35 STAINLESS STEEL STAPLES: Brand: PROXIMATE

## (undated) DEVICE — CUFF,BP,DISP,1 TUBE,ADULT,HP: Brand: MEDLINE

## (undated) DEVICE — MASK,OXYGEN,MED CONC,ADLT,7' TUB, UC: Brand: PENDING

## (undated) DEVICE — Device: Brand: DEFENDO AIR/WATER/SUCTION AND BIOPSY VALVE

## (undated) DEVICE — THE CHANNEL CLEANING BRUSH IS A NYLON FLEXI BRUSH ATTACHED TO A FLEXIBLE PLASTIC SHEATH DESIGNED TO SAFELY REMOVE DEBRIS FROM FLEXIBLE ENDOSCOPES.